# Patient Record
Sex: FEMALE | Race: WHITE | Employment: FULL TIME | URBAN - METROPOLITAN AREA
[De-identification: names, ages, dates, MRNs, and addresses within clinical notes are randomized per-mention and may not be internally consistent; named-entity substitution may affect disease eponyms.]

---

## 2017-01-30 DIAGNOSIS — M15.9 GENERALIZED OSTEOARTHRITIS: ICD-10-CM

## 2017-01-30 RX ORDER — CELECOXIB 200 MG/1
CAPSULE ORAL
Qty: 90 CAP | Refills: 1 | Status: SHIPPED | OUTPATIENT
Start: 2017-01-30 | End: 2017-07-24 | Stop reason: SDUPTHER

## 2017-02-14 ENCOUNTER — HOSPITAL ENCOUNTER (EMERGENCY)
Age: 57
Discharge: HOME | End: 2017-02-14
Attending: EMERGENCY MEDICINE

## 2017-02-14 VITALS
TEMPERATURE: 98.1 F | BODY MASS INDEX: 31.92 KG/M2 | OXYGEN SATURATION: 98 % | RESPIRATION RATE: 18 BRPM | HEART RATE: 74 BPM | SYSTOLIC BLOOD PRESSURE: 118 MMHG | DIASTOLIC BLOOD PRESSURE: 77 MMHG | HEIGHT: 68 IN | WEIGHT: 210.6 LBS

## 2017-02-14 DIAGNOSIS — J06.9 ACUTE UPPER RESPIRATORY INFECTION: Primary | ICD-10-CM

## 2017-02-14 RX ORDER — FLUTICASONE PROPIONATE 50 MCG
2 SPRAY, SUSPENSION (ML) NASAL DAILY
Qty: 1 BOTTLE | Refills: 0 | Status: SHIPPED | OUTPATIENT
Start: 2017-02-14 | End: 2017-03-30 | Stop reason: ALTCHOICE

## 2017-02-14 NOTE — DISCHARGE INSTRUCTIONS

## 2017-02-14 NOTE — UC PROVIDER NOTE
Patient is a 60 y.o. female presenting with nasal congestion. The history is provided by the patient. Nasal Congestion   This is a new problem. The current episode started more than 2 days ago. The problem occurs constantly. The problem has not changed since onset. Pertinent negatives include no chest pain, no abdominal pain, no headaches and no shortness of breath. Nothing aggravates the symptoms. Nothing relieves the symptoms. Treatments tried: nyquil and dayquil. The treatment provided no relief.         Past Medical History   Diagnosis Date    Anxiety     Cervical polyp 2014     BENIGN ENDOCERVICAL POLYP    Colon polyps     GERD (gastroesophageal reflux disease)     Hypertension     LGSIL (low grade squamous intraepithelial lesion) on Pap smear 2006     s/p colposcopy, PAP nl since then    Menopausal syndrome (hot flashes) 9/9/2013    Obesity     Osteoarthritis     Osteopenia 9/27/2016    Primary osteoarthritis of knees, bilateral 4/13/2016    Restless leg syndrome 9/9/2013        Past Surgical History   Procedure Laterality Date    Hx colonoscopy  2009     GI- Dr Feliz Garduno Hx colposcopy  2006    Hx colonoscopy  7/30/12     polyp, irregularities, repeat 3 yrs    Hx bunionectomy Left 2008    Hx other surgical Left 5/8/15     LEFT FOOT LAPIDUS ARTHRODESIS, LEFT 2ND, 3RD MET CUNEIFORM FUSION, GASTROCNEMIS RECESSION, STJ ARTHROERESIS (GENERAL WITH POPLITEAL BLOCK)    Hx colonoscopy  9/24/15     normal    Hx heent       DENTAL IMPLANT    Hx knee arthroscopy Left 2013     (torn meniscus)    Hx knee arthroscopy Right 3/14    Hx orthopaedic Left 2015     BUNIONECTOMY LEFT FOOT    Hx orthopaedic Left 2015     LEFT FOOT SURGERY - PLATES, SCREWS, PINS    Hx knee replacement Bilateral 04/13/2016         Family History   Problem Relation Age of Onset    Parkinsonism Father     Hypertension Father     Arthritis-osteo Father     Cancer Father      PROSTATE-TREATED WITH ESTROGEN    Cancer Sister BREAST    Breast Cancer Sister 48    Glaucoma Mother     Heart Disease Mother      multiple MI's in her 52's    Breast Cancer Daughter 48    Anesth Problems Neg Hx         Social History     Social History    Marital status:      Spouse name: N/A    Number of children: N/A    Years of education: N/A     Occupational History    Not on file. Social History Main Topics    Smoking status: Never Smoker    Smokeless tobacco: Never Used    Alcohol use 3.6 oz/week     6 Standard drinks or equivalent per week    Drug use: No    Sexual activity: No     Other Topics Concern    Not on file     Social History Narrative    ** Merged History Encounter **                     ALLERGIES: Review of patient's allergies indicates no known allergies. Review of Systems   Constitutional: Negative. HENT: Positive for postnasal drip, rhinorrhea, sinus pressure and sneezing. Negative for drooling, ear pain, mouth sores, sore throat and trouble swallowing. Eyes: Negative. Respiratory: Negative for cough and shortness of breath. Cardiovascular: Negative for chest pain. Gastrointestinal: Negative. Negative for abdominal pain. Musculoskeletal: Negative. Neurological: Negative for headaches. Vitals:    02/14/17 1601   BP: 118/77   Pulse: 74   Resp: 18   Temp: 98.1 °F (36.7 °C)   SpO2: 98%   Weight: 95.5 kg (210 lb 9.6 oz)   Height: 5' 8\" (1.727 m)       Physical Exam   Constitutional: She is oriented to person, place, and time. She appears well-developed and well-nourished. HENT:   Head: Normocephalic and atraumatic. Right Ear: External ear normal.   Left Ear: External ear normal.   Mouth/Throat: Oropharynx is clear and moist. No oropharyngeal exudate. Eyes: Conjunctivae and EOM are normal. Pupils are equal, round, and reactive to light. Right eye exhibits no discharge. Left eye exhibits no discharge. No scleral icterus. Neck: Normal range of motion. No tracheal deviation present. No thyromegaly present. Cardiovascular: Normal rate, regular rhythm and normal heart sounds. No murmur heard. Pulmonary/Chest: Effort normal and breath sounds normal. No respiratory distress. She has no wheezes. She has no rales. She exhibits no tenderness. Abdominal: Soft. Bowel sounds are normal. She exhibits no distension. There is no tenderness. There is no rebound and no guarding. Musculoskeletal: Normal range of motion. She exhibits no edema or tenderness. Lymphadenopathy:     She has no cervical adenopathy. Neurological: She is alert and oriented to person, place, and time. No cranial nerve deficit. Coordination normal.   Skin: Skin is warm. No erythema. Psychiatric: She has a normal mood and affect. Her behavior is normal. Judgment and thought content normal.   Nursing note and vitals reviewed.       MDM     Differential Diagnosis; Clinical Impression; Plan:     Viral uri      Procedures

## 2017-03-30 ENCOUNTER — OFFICE VISIT (OUTPATIENT)
Dept: FAMILY MEDICINE CLINIC | Age: 57
End: 2017-03-30

## 2017-03-30 VITALS
RESPIRATION RATE: 18 BRPM | BODY MASS INDEX: 33.8 KG/M2 | TEMPERATURE: 97.9 F | SYSTOLIC BLOOD PRESSURE: 107 MMHG | WEIGHT: 223 LBS | DIASTOLIC BLOOD PRESSURE: 75 MMHG | HEART RATE: 105 BPM | HEIGHT: 68 IN | OXYGEN SATURATION: 98 %

## 2017-03-30 DIAGNOSIS — J02.0 STREP PHARYNGITIS: Primary | ICD-10-CM

## 2017-03-30 RX ORDER — CHOLECALCIFEROL (VITAMIN D3) 125 MCG
2000 CAPSULE ORAL DAILY
COMMUNITY

## 2017-03-30 RX ORDER — AMOXICILLIN 875 MG/1
875 TABLET, FILM COATED ORAL 2 TIMES DAILY
Qty: 20 TAB | Refills: 0 | Status: SHIPPED | OUTPATIENT
Start: 2017-03-30 | End: 2017-04-09

## 2017-03-30 NOTE — PROGRESS NOTES
Subjective:   Anastasia Gallagher is a 60 y.o. female who complains of sore throat and nausea for 1 day, stable since that time. Throat feels same as when she had strep few months ago. She works in [employer], got a nurse to swab her throat, swab was + for strep. She denies a history of fevers, shortness of breath and wheezing. Evaluation to date: none. Treatment to date: OTC products. Patient does not smoke cigarettes. Relevant PMH:   Past Medical History:   Diagnosis Date    Anxiety     Cervical polyp 2014    BENIGN ENDOCERVICAL POLYP    Colon polyps     GERD (gastroesophageal reflux disease)     Hypertension     LGSIL (low grade squamous intraepithelial lesion) on Pap smear 2006    s/p colposcopy, PAP nl since then    Menopausal syndrome (hot flashes) 9/9/2013    Obesity     Osteoarthritis     Osteopenia 9/27/2016    Primary osteoarthritis of knees, bilateral 4/13/2016    Restless leg syndrome 9/9/2013     Past Surgical History:   Procedure Laterality Date    HX BUNIONECTOMY Left 2008    HX COLONOSCOPY  2009    GI- Dr Molly Guzman    HX COLONOSCOPY  7/30/12    polyp, irregularities, repeat 3 yrs    HX COLONOSCOPY  9/24/15    normal    HX COLPOSCOPY  2006    HX HEENT      DENTAL IMPLANT    HX KNEE ARTHROSCOPY Left 2013    (torn meniscus)    HX KNEE ARTHROSCOPY Right 3/14    HX KNEE REPLACEMENT Bilateral 04/13/2016    HX ORTHOPAEDIC Left 2015    BUNIONECTOMY LEFT FOOT    HX ORTHOPAEDIC Left 2015    LEFT FOOT SURGERY - PLATES, SCREWS, PINS    HX OTHER SURGICAL Left 5/8/15    LEFT FOOT LAPIDUS ARTHRODESIS, LEFT 2ND, 3RD MET CUNEIFORM FUSION, GASTROCNEMIS RECESSION, STJ ARTHROERESIS (GENERAL WITH POPLITEAL BLOCK)     No Known Allergies    Review of Systems  Pertinent items are noted in HPI.     Objective:     Visit Vitals    /75    Pulse (!) 105    Temp 97.9 °F (36.6 °C) (Oral)    Resp 18    Ht 5' 8\" (1.727 m)    Wt 223 lb (101.2 kg)    LMP 11/25/2012    SpO2 98%    BMI 33.91 kg/m2     General:  alert, cooperative, no distress   Eyes: negative   Ears: normal TM's and external ear canals AU   Sinuses: Normal paranasal sinuses without tenderness   Mouth:  Lips, mucosa, and tongue normal. Teeth and gums normal and normal findings: oropharynx pink & moist without lesions or evidence of thrush   Neck: supple, symmetrical, trachea midline and mild anterior cervical adenopathy. Heart: S1 and S2 normal, no murmurs noted. Lungs: clear to auscultation bilaterally     Rapid strep test that was brought in Ziploc bag appears positive       Assessment/Plan:       ICD-10-CM ICD-9-CM    1. Strep pharyngitis J02.0 034. 0      Amoxil as directed  Suggested symptomatic OTC remedies. Antibiotics per orders. RTC prn.       Darci Donahue NP

## 2017-03-30 NOTE — PATIENT INSTRUCTIONS

## 2017-04-18 DIAGNOSIS — N95.1 MENOPAUSAL SYNDROME (HOT FLASHES): ICD-10-CM

## 2017-04-18 RX ORDER — PAROXETINE 10 MG/1
TABLET, FILM COATED ORAL
Qty: 90 TAB | Refills: 1 | Status: SHIPPED | OUTPATIENT
Start: 2017-04-18 | End: 2017-09-28 | Stop reason: SDUPTHER

## 2017-05-23 ENCOUNTER — OFFICE VISIT (OUTPATIENT)
Dept: FAMILY MEDICINE CLINIC | Age: 57
End: 2017-05-23

## 2017-05-23 VITALS
WEIGHT: 223 LBS | RESPIRATION RATE: 18 BRPM | OXYGEN SATURATION: 97 % | SYSTOLIC BLOOD PRESSURE: 110 MMHG | TEMPERATURE: 98.6 F | DIASTOLIC BLOOD PRESSURE: 73 MMHG | BODY MASS INDEX: 33.8 KG/M2 | HEIGHT: 68 IN | HEART RATE: 88 BPM

## 2017-05-23 DIAGNOSIS — J02.0 STREP THROAT: Primary | ICD-10-CM

## 2017-05-23 RX ORDER — AMOXICILLIN AND CLAVULANATE POTASSIUM 875; 125 MG/1; MG/1
1 TABLET, FILM COATED ORAL EVERY 12 HOURS
Qty: 20 TAB | Refills: 0 | Status: SHIPPED | OUTPATIENT
Start: 2017-05-23 | End: 2017-06-02

## 2017-05-23 NOTE — MR AVS SNAPSHOT
Visit Information Date & Time Provider Department Dept. Phone Encounter #  
 5/23/2017  8:45 AM Delia Dubon, [address] [phone] [id number] Follow-up Instructions Return if symptoms worsen or fail to improve. Your Appointments 8/28/2017 12:30 PM  
PHYSICAL with Poly Peralta MD  
[facility] [address] [facility]) Appt Note: cpe; reschedule from 8/22/17  
 [address] [facility] [address]  
[address] [address]  [address] Upcoming Health Maintenance Date Due  
 PAP AKA CERVICAL CYTOLOGY 4/16/2017 INFLUENZA AGE 9 TO ADULT 8/1/2017 BREAST CANCER SCRN MAMMOGRAM 9/26/2018 COLONOSCOPY 9/24/2020 DTaP/Tdap/Td series (2 - Td) 4/3/2023 Allergies as of 5/23/2017  Review Complete On: 5/23/2017 By: Delia Dubon NP No Known Allergies Current Immunizations  Reviewed on 8/19/2016 Name Date Influenza Vaccine 10/13/2016, 10/13/2015, 10/1/2014, 10/1/2013, 12/1/2012 Tdap 4/3/2013 Not reviewed this visit You Were Diagnosed With   
  
 Codes Comments Strep throat    -  Primary ICD-10-CM: J02.0 ICD-9-CM: 034.0 Vitals BP Pulse Temp Resp Height(growth percentile) Weight(growth percentile) 110/73 88 98.6 °F (37 °C) (Oral) 18 5' 8\" (1.727 m) 223 lb (101.2 kg) LMP SpO2 BMI OB Status Smoking Status 11/25/2012 97% 33.91 kg/m2 Postmenopausal Never Smoker BMI and BSA Data Body Mass Index Body Surface Area  
 33.91 kg/m 2 2.2 m 2 Preferred Pharmacy Pharmacy Name Phone [id number] [phone] Your Updated Medication List  
  
   
This list is accurate as of: 5/23/17  9:26 AM.  Always use your most recent med list.  
  
  
  
  
 acetaminophen 500 mg tablet Commonly known as:  TYLENOL  
 Take 1 Tab by mouth every four (4) hours (while awake). Indications: PAIN  
  
 ALPRAZolam 0.5 mg tablet Commonly known as:  Ezzie Irma Take 1 Tab by mouth every twelve (12) hours as needed. amoxicillin-clavulanate 875-125 mg per tablet Commonly known as:  AUGMENTIN Take 1 Tab by mouth every twelve (12) hours for 10 days. celecoxib 200 mg capsule Commonly known as:  CELEBREX  
TAKE 1 CAP BY MOUTH DAILY. lisinopril 20 mg tablet Commonly known as:  PRINIVIL, ZESTRIL  
TAKE 1 TABLET DAILY MULTIVITAMIN PO Take 1 Tab by mouth daily. omeprazole 40 mg capsule Commonly known as:  PRILOSEC  
TAKE 1 CAPSULE DAILY PARoxetine 10 mg tablet Commonly known as:  PAXIL TAKE 1 TAB BY MOUTH DAILY. VITAMIN D3 2,000 unit Tab Generic drug:  cholecalciferol (vitamin D3) Take  by mouth. Prescriptions Sent to Pharmacy Refills  
 amoxicillin-clavulanate (AUGMENTIN) 875-125 mg per tablet 0 Sig: Take 1 Tab by mouth every twelve (12) hours for 10 days. Class: Normal  
 Pharmacy: [address] #: [phone] Route: Oral  
  
Follow-up Instructions Return if symptoms worsen or fail to improve. Patient Instructions Sore Throat: Care Instructions Your Care Instructions Infection by bacteria or a virus causes most sore throats. Cigarette smoke, dry air, air pollution, allergies, and yelling can also cause a sore throat. Sore throats can be painful and annoying. Fortunately, most sore throats go away on their own. If you have a bacterial infection, your doctor may prescribe antibiotics. Follow-up care is a key part of your treatment and safety. Be sure to make and go to all appointments, and call your doctor if you are having problems. It's also a good idea to know your test results and keep a list of the medicines you take. How can you care for yourself at home? · If your doctor prescribed antibiotics, take them as directed. Do not stop taking them just because you feel better. You need to take the full course of antibiotics. · Gargle with warm salt water once an hour to help reduce swelling and relieve discomfort. Use 1 teaspoon of salt mixed in 1 cup of warm water. · Take an over-the-counter pain medicine, such as acetaminophen (Tylenol), ibuprofen (Advil, Motrin), or naproxen (Aleve). Read and follow all instructions on the label. · Be careful when taking over-the-counter cold or flu medicines and Tylenol at the same time. Many of these medicines have acetaminophen, which is Tylenol. Read the labels to make sure that you are not taking more than the recommended dose. Too much acetaminophen (Tylenol) can be harmful. · Drink plenty of fluids. Fluids may help soothe an irritated throat. Hot fluids, such as tea or soup, may help decrease throat pain. · Use over-the-counter throat lozenges to soothe pain. Regular cough drops or hard candy may also help. These should not be given to young children because of the risk of choking. · Do not smoke or allow others to smoke around you. If you need help quitting, talk to your doctor about stop-smoking programs and medicines. These can increase your chances of quitting for good. · Use a vaporizer or humidifier to add moisture to your bedroom. Follow the directions for cleaning the machine. When should you call for help? Call your doctor now or seek immediate medical care if: 
· You have new or worse trouble swallowing. · Your sore throat gets much worse on one side. Watch closely for changes in your health, and be sure to contact your doctor if you do not get better as expected. Where can you learn more? Go to http://jairon-ryan.info/. Enter [phone] [id number] [phone] in the search box to learn more about \"Sore Throat: Care Instructions. \" Current as of: July 29, 2016 Content Version: 11.2 © 0240-6406 [employer]. Care instructions adapted under license by [employer] (which disclaims liability or warranty for this information). If you have questions about a medical condition or this instruction, always ask your healthcare professional. [address] any warranty or liability for your use of this information. Introducing [facility]! Dear Matthew Agudelo: 
Thank you for requesting a MatchMine account. Our records indicate that you already have an active MatchMine account. You can access your account anytime at [email or web address]. [email or web address] Play Sports Did you know that you can access your hospital and ER discharge instructions at any time in MatchMine? You can also review all of your test results from your hospital stay or ER visit. Additional Information If you have questions, please visit the Frequently Asked Questions section of the MatchMine website at [email or web address] Remember, MatchMine is NOT to be used for urgent needs. For medical emergencies, dial 911. Now available from your iPhone and Android! Please provide this summary of care documentation to your next provider. Your primary care clinician is listed as Enma Kramer. If you have any questions after today's visit, please call [phone].

## 2017-05-23 NOTE — PROGRESS NOTES
Chief Complaint   Patient presents with    Sore Throat     for 1 day, tested positive for strep in her office today

## 2017-05-24 NOTE — PROGRESS NOTES
Subjective:   Vishal Blas is a 60 y.o. female who complains of sore throat and swollen glands for 4 days. She denies a history of shortness of breath and wheezing. Patient does not smoke cigarettes. Relevant PMH: No pertinent additional PMH. Objective:      Visit Vitals    /73    Pulse 88    Temp 98.6 °F (37 °C) (Oral)    Resp 18    Ht 5' 8\" (1.727 m)    Wt 223 lb (101.2 kg)    LMP 11/25/2012    SpO2 97%    BMI 33.91 kg/m2      Appears in mild to moderate distress. Ears: bilateral TM's and external ear canals normal  Oropharynx: erythematous and exudate noted  Neck: bilateral symmetric anterior adenopathy  Lungs: clear to auscultation, no wheezes, rales or rhonchi, symmetric air entry  The abdomen is soft without tenderness or hepatosplenomegaly. Rapid Strep test is positive    Assessment/Plan:   strep pharyngitis  Per orders. Gargle, use acetaminophen or other OTC analgesic, and take Rx fully as prescribed. Call if other family members develop similar symptoms. See prn. ICD-10-CM ICD-9-CM    1. Strep throat J02.0 034.0 amoxicillin-clavulanate (AUGMENTIN) 875-125 mg per tablet   .

## 2017-06-20 ENCOUNTER — OFFICE VISIT (OUTPATIENT)
Dept: INTERNAL MEDICINE CLINIC | Age: 57
End: 2017-06-20

## 2017-06-20 VITALS
RESPIRATION RATE: 16 BRPM | BODY MASS INDEX: 33.95 KG/M2 | DIASTOLIC BLOOD PRESSURE: 80 MMHG | WEIGHT: 224 LBS | TEMPERATURE: 98.2 F | OXYGEN SATURATION: 96 % | HEART RATE: 86 BPM | HEIGHT: 68 IN | SYSTOLIC BLOOD PRESSURE: 116 MMHG

## 2017-06-20 DIAGNOSIS — J02.9 SORE THROAT: Primary | ICD-10-CM

## 2017-06-20 DIAGNOSIS — R05.9 COUGH: ICD-10-CM

## 2017-06-20 DIAGNOSIS — J35.1 ENLARGED TONSILS: ICD-10-CM

## 2017-06-20 LAB
S PYO AG THROAT QL: NEGATIVE
VALID INTERNAL CONTROL?: YES

## 2017-06-20 RX ORDER — BENZONATATE 200 MG/1
200 CAPSULE ORAL
Qty: 20 CAP | Refills: 0 | Status: SHIPPED | OUTPATIENT
Start: 2017-06-20 | End: 2017-06-27

## 2017-06-20 NOTE — PROGRESS NOTES
Su Byrd is a 60 y.o. female who was seen in clinic today (6/20/2017). Assessment & Plan:  Diagnoses and all orders for this visit:    1. Sore throat- RST negative today, 3+ tests in last 9 months, will have her see ENT & check throat cx.  -     AMB POC RAPID STREP A  -     REFERRAL TO ENT-OTOLARYNGOLOGY  -     UPPER RESPIRATORY CULTURE    2. Enlarged tonsils  -     REFERRAL TO ENT-OTOLARYNGOLOGY  -     UPPER RESPIRATORY CULTURE    3. Cough- new dx, likely post nasal drip vs bronchitis, otherwise asymptomatic, cont to monitor, start on meds below. Red flags and expectations were reviewed & discussed with the her. She verbalized understanding.   -     benzonatate (TESSALON) 200 mg capsule; Take 1 Cap by mouth three (3) times daily as needed for Cough for up to 7 days. Follow-up Disposition:  Return if symptoms worsen or fail to improve.       ----------------------------------------------------------------------    Subjective:  Sherrell Boas was seen today for Sore Throat and Cough    URI Review  Sherrell Boas returns to clinic today to talk about: sore throat for the last 1 month, which is fluctuating since that time. She also reports productive cough. She denies a history of: fever, chills, rhinorrhea, sinus congestion and chest congestion. Treatments have included: none. Relevant PMH: this is her 4th sore throat visit since 10/16. She has had strep x 3 times. Patient reports sick contacts: no.           Prior to Admission medications    Medication Sig Start Date End Date Taking? Authorizing Provider   PARoxetine (PAXIL) 10 mg tablet TAKE 1 TAB BY MOUTH DAILY. 4/18/17  Yes Adrienne Muhammad MD   cholecalciferol, vitamin D3, (VITAMIN D3) 2,000 unit tab Take 2,000 Units by mouth daily. Yes Historical Provider   celecoxib (CELEBREX) 200 mg capsule TAKE 1 CAP BY MOUTH DAILY.  1/30/17  Yes Adrienne Muhammad MD   omeprazole (PRILOSEC) 40 mg capsule TAKE 1 CAPSULE DAILY  Patient taking differently: TAKE 1 CAPSULE by mouth DAILY as needed 9/7/16  Yes Max Engle MD   ALPRAZolam Abby Doll) 0.5 mg tablet Take 1 Tab by mouth every twelve (12) hours as needed. 8/19/16  Yes Max Engle MD   lisinopril (PRINIVIL, ZESTRIL) 20 mg tablet TAKE 1 TABLET DAILY  Patient taking differently: Take 20 mg by mouth daily. TAKE 1 TABLET DAILY 8/19/16  Yes Max Engle MD   acetaminophen (TYLENOL) 500 mg tablet Take 1 Tab by mouth every four (4) hours (while awake). Indications: PAIN  Patient taking differently: Take 500 mg by mouth every six (6) hours as needed. Indications: Pain 4/15/16  Yes Jo Pitts MD   MULTIVITAMIN PO Take 1 Tab by mouth daily. 4/27/11  Yes Historical Provider          No Known Allergies        ROS : per HPI       Objective:   Physical Exam   Constitutional: No distress. HENT:   Right Ear: Tympanic membrane is not erythematous and not bulging. No middle ear effusion. Left Ear: Tympanic membrane is not erythematous and not bulging. No middle ear effusion. Nose: No mucosal edema or rhinorrhea. Right sinus exhibits no maxillary sinus tenderness and no frontal sinus tenderness. Left sinus exhibits no maxillary sinus tenderness and no frontal sinus tenderness. Mouth/Throat: Uvula is midline and mucous membranes are normal. Posterior oropharyngeal erythema present. No oropharyngeal exudate. Tonsils no visible on the L, 2+ on the R   Eyes: Conjunctivae are normal. No scleral icterus. Neck: Neck supple. Cardiovascular: Regular rhythm and normal heart sounds. No murmur heard. Pulmonary/Chest: Effort normal and breath sounds normal. She has no wheezes. She has no rales. Lymphadenopathy:     She has no cervical adenopathy.          Visit Vitals    /80    Pulse 86    Temp 98.2 °F (36.8 °C) (Oral)    Resp 16    Ht 5' 8\" (1.727 m)    Wt 224 lb (101.6 kg)    LMP 11/25/2012    SpO2 96%    BMI 34.06 kg/m2         Disclaimer:  Advised her to call back or return to office if symptoms worsen/change/persist.  Discussed expected course/resolution/complications of diagnosis in detail with patient. Medication risks/benefits/costs/interactions/alternatives discussed with patient. She was given an after visit summary which includes diagnoses, current medications, & vitals. She expressed understanding with the diagnosis and plan.         Coty Nowak MD

## 2017-06-20 NOTE — PATIENT INSTRUCTIONS
Sore Throat: Care Instructions  Your Care Instructions    Infection by bacteria or a virus causes most sore throats. Cigarette smoke, dry air, air pollution, allergies, and yelling can also cause a sore throat. Sore throats can be painful and annoying. Fortunately, most sore throats go away on their own. If you have a bacterial infection, your doctor may prescribe antibiotics. Follow-up care is a key part of your treatment and safety. Be sure to make and go to all appointments, and call your doctor if you are having problems. It's also a good idea to know your test results and keep a list of the medicines you take. How can you care for yourself at home? · If your doctor prescribed antibiotics, take them as directed. Do not stop taking them just because you feel better. You need to take the full course of antibiotics. · Gargle with warm salt water once an hour to help reduce swelling and relieve discomfort. Use 1 teaspoon of salt mixed in 1 cup of warm water. · Take an over-the-counter pain medicine, such as acetaminophen (Tylenol), ibuprofen (Advil, Motrin), or naproxen (Aleve). Read and follow all instructions on the label. · Be careful when taking over-the-counter cold or flu medicines and Tylenol at the same time. Many of these medicines have acetaminophen, which is Tylenol. Read the labels to make sure that you are not taking more than the recommended dose. Too much acetaminophen (Tylenol) can be harmful. · Drink plenty of fluids. Fluids may help soothe an irritated throat. Hot fluids, such as tea or soup, may help decrease throat pain. · Use over-the-counter throat lozenges to soothe pain. Regular cough drops or hard candy may also help. These should not be given to young children because of the risk of choking. · Do not smoke or allow others to smoke around you. If you need help quitting, talk to your doctor about stop-smoking programs and medicines.  These can increase your chances of quitting for good. · Use a vaporizer or humidifier to add moisture to your bedroom. Follow the directions for cleaning the machine. When should you call for help? Call your doctor now or seek immediate medical care if:  · You have new or worse trouble swallowing. · Your sore throat gets much worse on one side. Watch closely for changes in your health, and be sure to contact your doctor if you do not get better as expected. Where can you learn more? Go to http://jairon-ryan.info/. Enter [phone] in the search box to learn more about \"Sore Throat: Care Instructions. \"  Current as of: July 29, 2016  Content Version: 11.3  © 1263-4000 [employer]. Care instructions adapted under license by [employer] (which disclaims liability or warranty for this information). If you have questions about a medical condition or this instruction, always ask your healthcare professional. [address] any warranty or liability for your use of this information.

## 2017-06-22 ENCOUNTER — PATIENT MESSAGE (OUTPATIENT)
Dept: INTERNAL MEDICINE CLINIC | Age: 57
End: 2017-06-22

## 2017-06-22 LAB
BACTERIA SPEC RESP CULT: ABNORMAL
BACTERIA SPEC RESP CULT: ABNORMAL

## 2017-06-22 RX ORDER — AMOXICILLIN 875 MG/1
875 TABLET, FILM COATED ORAL 2 TIMES DAILY
Qty: 20 TAB | Refills: 0 | Status: SHIPPED | OUTPATIENT
Start: 2017-06-22 | End: 2017-07-02

## 2017-06-23 NOTE — TELEPHONE ENCOUNTER
----- Message from Nisha Arreaga MD sent at 6/22/2017  8:55 PM EDT -----  Regarding: FW: Test Results Question  Contact: [phone]  Send copy of my note & throat cx to Dr Nisha Arreaga (ENT).    ----- Message -----     From: Cecil Frankel     Sent: 6/22/2017   8:08 PM       To: [facility] Malinda Percy  Subject: Test Results Question                            I just saw the message where my culture tested positive for strep. I've visited with Dr. Nisha Arreaga today and he already put me on an antibiotic, a nasal rinse, and a throat gargle.

## 2017-06-23 NOTE — PROGRESS NOTES
Please call patient. RST negative in the office but throat cx + for strep. Amoxicillin has been sent in.   Needs to see ENT due to recurrent infections

## 2017-07-24 DIAGNOSIS — M15.9 GENERALIZED OSTEOARTHRITIS: ICD-10-CM

## 2017-07-24 RX ORDER — CELECOXIB 200 MG/1
CAPSULE ORAL
Qty: 90 CAP | Refills: 1 | Status: SHIPPED | OUTPATIENT
Start: 2017-07-24 | End: 2018-01-13 | Stop reason: SDUPTHER

## 2017-09-28 DIAGNOSIS — N95.1 MENOPAUSAL SYNDROME (HOT FLASHES): ICD-10-CM

## 2017-09-28 RX ORDER — PAROXETINE 10 MG/1
TABLET, FILM COATED ORAL
Qty: 90 TAB | Refills: 0 | Status: SHIPPED | OUTPATIENT
Start: 2017-09-28 | End: 2017-12-31 | Stop reason: SDUPTHER

## 2017-10-06 ENCOUNTER — HOSPITAL ENCOUNTER (OUTPATIENT)
Dept: MAMMOGRAPHY | Age: 57
Discharge: HOME | End: 2017-10-06
Attending: INTERNAL MEDICINE
Payer: COMMERCIAL

## 2017-10-06 DIAGNOSIS — Z12.31 VISIT FOR SCREENING MAMMOGRAM: ICD-10-CM

## 2017-10-06 PROCEDURE — 77063 BREAST TOMOSYNTHESIS BI: CPT

## 2017-10-07 ENCOUNTER — OFFICE VISIT (OUTPATIENT)
Dept: PRIMARY CARE CLINIC | Age: 57
End: 2017-10-07

## 2017-10-07 DIAGNOSIS — R11.2 NAUSEA AND VOMITING, INTRACTABILITY OF VOMITING NOT SPECIFIED, UNSPECIFIED VOMITING TYPE: ICD-10-CM

## 2017-10-07 DIAGNOSIS — R19.7 DIARRHEA, UNSPECIFIED TYPE: ICD-10-CM

## 2017-10-07 DIAGNOSIS — J10.1 INFLUENZA B: Primary | ICD-10-CM

## 2017-10-07 RX ORDER — ONDANSETRON 4 MG/1
4 TABLET, ORALLY DISINTEGRATING ORAL
Qty: 10 TAB | Refills: 0 | Status: SHIPPED | OUTPATIENT
Start: 2017-10-07 | End: 2017-11-19

## 2017-10-07 RX ORDER — OSELTAMIVIR PHOSPHATE 75 MG/1
75 CAPSULE ORAL 2 TIMES DAILY
Qty: 10 CAP | Refills: 0 | Status: SHIPPED | OUTPATIENT
Start: 2017-10-07 | End: 2017-10-12

## 2017-10-07 NOTE — PROGRESS NOTES
Subjective:   Aubrie Merino is a 59 y.o. female who complains of headache, fever (Tmax 100.2), chills, nausea, vomiting, and diarrhea for 1 day, gradually improving since that time. Symptoms started suddenly last night around 5pm after work. She reports 10-12 episodes of vomiting and diarrhea that lasted for about 6-7 hours. Today she has headache, but denies any further nausea, vomiting, or diarrhea. She denies any sore throat, cough, or congestion. She denies any abdominal pain or bloody stools. She is taking Tylenol for the h/a. Today she is taking fluids and ate some eggs which she has tolerated well. She has not had her flu vaccine for this season yet. Denies any sick contacts. Denies any foreign travel. Evaluation to date: none. Treatment to date: OTC products. Patient does not smoke cigarettes.   Relevant PMH:   Past Medical History:   Diagnosis Date    Anxiety     Cervical polyp 2014    BENIGN ENDOCERVICAL POLYP    Colon polyps     GERD (gastroesophageal reflux disease)     Hypertension     LGSIL (low grade squamous intraepithelial lesion) on Pap smear 2006    s/p colposcopy, PAP nl since then    Menopausal syndrome (hot flashes) 9/9/2013    Obesity     Osteoarthritis     Osteopenia 9/27/2016    Primary osteoarthritis of knees, bilateral 4/13/2016    Restless leg syndrome 9/9/2013     Past Surgical History:   Procedure Laterality Date    HX BUNIONECTOMY Left 2008    HX COLONOSCOPY  2009    GI- Dr Steve Arevalo    HX COLONOSCOPY  7/30/12    polyp, irregularities, repeat 3 yrs    HX COLONOSCOPY  9/24/15    normal    HX COLPOSCOPY  2006    HX HEENT      DENTAL IMPLANT    HX KNEE ARTHROSCOPY Left 2013    (torn meniscus)    HX KNEE ARTHROSCOPY Right 3/14    HX KNEE REPLACEMENT Bilateral 04/13/2016    HX ORTHOPAEDIC Left 2015    BUNIONECTOMY LEFT FOOT    HX ORTHOPAEDIC Left 2015    LEFT FOOT SURGERY - PLATES, SCREWS, PINS    HX OTHER SURGICAL Left 5/8/15    LEFT FOOT LAPIDUS ARTHRODESIS, LEFT 2ND, 3RD MET CUNEIFORM FUSION, GASTROCNEMIS RECESSION, STJ ARTHROERESIS (GENERAL WITH POPLITEAL BLOCK)     No Known Allergies      Review of Systems  Pertinent items are noted in HPI. Objective:     Visit Vitals    /84 (BP 1 Location: Left arm, BP Patient Position: Sitting)    Pulse (!) 2    Temp 99.2 °F (37.3 °C) (Oral)    Wt 234 lb 3.2 oz (106.2 kg)    LMP 11/25/2012    SpO2 96%    BMI 35.61 kg/m2     General:  alert, cooperative, no distress   Eyes: negative   Ears: normal TM's and external ear canals AU   Sinuses: Normal paranasal sinuses without tenderness   Mouth:  Lips, mucosa, and tongue normal. Teeth and gums normal and normal findings: oropharynx pink & moist without lesions   Neck: supple, symmetrical, trachea midline and no adenopathy. Heart: S1 and S2 normal, no murmurs noted. Lungs: clear to auscultation bilaterally   Abdomen: soft, non-tender. Bowel sounds normal. No masses,  no organomegaly        Rapid flu - + influenza B    Assessment/Plan:       ICD-10-CM ICD-9-CM    1. Influenza B J10.1 487.1    2. Nausea and vomiting, intractability of vomiting not specified, unspecified vomiting type R11.2 787.01    3. Diarrhea, unspecified type R19.7 787.91      Orders Placed This Encounter    ondansetron (ZOFRAN ODT) 4 mg disintegrating tablet    oseltamivir (TAMIFLU) 75 mg capsule     Precautions given, discussed RTW, work note given. Clear liquids, then advance to bland diet. Suggested symptomatic OTC remedies. RTC prn. Karina Zaman NP  This note will not be viewable in [address]

## 2017-10-07 NOTE — LETTER
NOTIFICATION RETURN TO WORK / SCHOOL 
 
10/7/2017 1:57 PM 
 
Ms. Brody Gibson [address] [place] “” [place] [address] To Whom It May Concern: 
 
Brody Gibson is currently under the care of [address] [place] [address]. She will return to work/school on TBD. If there are questions or concerns please have the patient contact our office. Sincerely, Miranda Mendoza NP

## 2017-10-07 NOTE — MR AVS SNAPSHOT
Visit Information Date & Time Provider Department Dept. Phone Encounter #  
 10/7/2017  1:00 PM Vivian Jean NP [address] [address] [phone] [id number] Follow-up Instructions Return if symptoms worsen or fail to improve. Your Appointments 1/23/2018  2:30 PM  
WELL WOMAN EXAM with Tato Rosario MD  
[address] [facility]) Appt Note: cpe; reschedule from 8/22/17; cpe  
 [address] [facility] [address] [facility] [address]  
[address] [id number] [address] Upcoming Health Maintenance Date Due  
 PAP AKA CERVICAL CYTOLOGY 4/16/2017 INFLUENZA AGE 9 TO ADULT 8/1/2017 BREAST CANCER SCRN MAMMOGRAM 10/6/2019 COLONOSCOPY 9/24/2020 DTaP/Tdap/Td series (2 - Td) 4/3/2023 Allergies as of 10/7/2017  Review Complete On: 10/7/2017 By: Vivian Jean NP No Known Allergies Current Immunizations  Reviewed on 6/20/2017 Name Date Influenza Vaccine 10/13/2016, 10/13/2015, 10/1/2014, 10/1/2013, 12/1/2012 Tdap 4/3/2013 Not reviewed this visit You Were Diagnosed With   
  
 Codes Comments Influenza B    -  Primary ICD-10-CM: J10.1 ICD-9-CM: 644.9 Nausea and vomiting, intractability of vomiting not specified, unspecified vomiting type     ICD-10-CM: R11.2 ICD-9-CM: 787.01 Diarrhea, unspecified type     ICD-10-CM: R19.7 ICD-9-CM: 787.91 Vitals BP Pulse Temp Weight(growth percentile) LMP SpO2  
 116/84 (BP 1 Location: Left arm, BP Patient Position: Sitting) (!) 2 99.2 °F (37.3 °C) (Oral) 234 lb 3.2 oz (106.2 kg) 11/25/2012 96% BMI OB Status Smoking Status 35.61 kg/m2 Postmenopausal Never Smoker Vitals History BMI and BSA Data Body Mass Index Body Surface Area  
 35.61 kg/m 2 2.26 m 2 Preferred Pharmacy Pharmacy Name Phone  [id number] [id number], [facility] AT AT [facility] [phone] Your Updated Medication List  
  
   
This list is accurate as of: 10/7/17  1:53 PM.  Always use your most recent med list.  
  
  
  
  
 acetaminophen 500 mg tablet Commonly known as:  TYLENOL Take 1 Tab by mouth every four (4) hours (while awake). Indications: PAIN  
  
 ALPRAZolam 0.5 mg tablet Commonly known as:  [place] Take 1 Tab by mouth every twelve (12) hours as needed. celecoxib 200 mg capsule Commonly known as:  CELEBREX  
TAKE 1 CAP BY MOUTH DAILY. lisinopril 20 mg tablet Commonly known as:  PRINIVIL, ZESTRIL  
TAKE 1 TABLET DAILY MULTIVITAMIN PO Take 1 Tab by mouth daily. omeprazole 40 mg capsule Commonly known as:  PRILOSEC  
TAKE 1 CAPSULE DAILY  
  
 ondansetron 4 mg disintegrating tablet Commonly known as:  ZOFRAN ODT Take 1 Tab by mouth every eight (8) hours as needed for Nausea. PARoxetine 10 mg tablet Commonly known as:  PAXIL TAKE 1 TAB BY MOUTH DAILY. VITAMIN D3 2,000 unit Tab Generic drug:  cholecalciferol (vitamin D3) Take 2,000 Units by mouth daily. Prescriptions Sent to Pharmacy Refills  
 ondansetron (ZOFRAN ODT) 4 mg disintegrating tablet 0 Sig: Take 1 Tab by mouth every eight (8) hours as needed for Nausea. Class: Normal  
 Pharmacy: [address] #: [phone] Route: Oral  
  
Follow-up Instructions Return if symptoms worsen or fail to improve. Patient Instructions Influenza (Flu): Care Instructions Your Care Instructions Influenza (flu) is an infection in the lungs and breathing passages. It is caused by the influenza virus. There are different strains, or types, of the flu virus from year to year.  Unlike the common cold, the flu comes on suddenly and the symptoms, such as a cough, congestion, fever, chills, fatigue, aches, and pains, are more severe. These symptoms may last up to 10 days. Although the flu can make you feel very sick, it usually doesn't cause serious health problems. Home treatment is usually all you need for flu symptoms. But your doctor may prescribe antiviral medicine to prevent other health problems, such as pneumonia, from developing. Older people and those who have a long-term health condition, such as lung disease, are most at risk for having pneumonia or other health problems. Follow-up care is a key part of your treatment and safety. Be sure to make and go to all appointments, and call your doctor if you are having problems. Its also a good idea to know your test results and keep a list of the medicines you take. How can you care for yourself at home? · Get plenty of rest. 
· Drink plenty of fluids, enough so that your urine is light yellow or clear like water. If you have kidney, heart, or liver disease and have to limit fluids, talk with your doctor before you increase the amount of fluids you drink. · Take an over-the-counter pain medicine if needed, such as acetaminophen (Tylenol), ibuprofen (Advil, Motrin), or naproxen (Aleve), to relieve fever, headache, and muscle aches. Read and follow all instructions on the label. No one younger than 20 should take aspirin. It has been linked to Reye syndrome, a serious illness. · Do not smoke. Smoking can make the flu worse. If you need help quitting, talk to your doctor about stop-smoking programs and medicines. These can increase your chances of quitting for good. · Breathe moist air from a hot shower or from a sink filled with hot water to help clear a stuffy nose. · Before you use cough and cold medicines, check the label. These medicines may not be safe for young children or for people with certain health problems. · If the skin around your nose and lips becomes sore, put some petroleum jelly on the area. · To ease coughing: ¨ Drink fluids to soothe a scratchy throat. ¨ Suck on cough drops or plain hard candy. ¨ Take an over-the-counter cough medicine that contains dextromethorphan to help you get some sleep. Read and follow all instructions on the label. ¨ Raise your head at night with an extra pillow. This may help you rest if coughing keeps you awake. · Take any prescribed medicine exactly as directed. Call your doctor if you think you are having a problem with your medicine. To avoid spreading the flu · Wash your hands regularly, and keep your hands away from your face. · Stay home from school, work, and other public places until you are feeling better and your fever has been gone for at least 24 hours. The fever needs to have gone away on its own without the help of medicine. · Ask people living with you to talk to their doctors about preventing the flu. They may get antiviral medicine to keep from getting the flu from you. · To prevent the flu in the future, get a flu vaccine every fall. Encourage people living with you to get the vaccine. · Cover your mouth when you cough or sneeze. When should you call for help? Call 911 anytime you think you may need emergency care. For example, call if: 
· You have severe trouble breathing. Call your doctor now or seek immediate medical care if: 
· You have new or worse trouble breathing. · You seem to be getting much sicker. · You feel very sleepy or confused. · You have a new or higher fever. · You get a new rash. Watch closely for changes in your health, and be sure to contact your doctor if: 
· You begin to get better and then get worse. · You are not getting better after 1 week. Where can you learn more? Go to http://jairon-ryan.info/. Enter R319 in the search box to learn more about \"Influenza (Flu): Care Instructions. \" Current as of: March 25, 2017 Content Version: 11.3 © 4297-6889 [employer]ing, [employer]orporated.  Care instructions adapted under license by [address] (which disclaims liability or warranty for this information). If you have questions about a medical condition or this instruction, always ask your healthcare professional. [address] any warranty or liability for your use of this information. Nausea and Vomiting: Care Instructions Your Care Instructions When you are nauseated, you may feel weak and sweaty and notice a lot of saliva in your mouth. Nausea often leads to vomiting. Most of the time you do not need to worry about nausea and vomiting, but they can be signs of other illnesses. Two common causes of nausea and vomiting are stomach flu and food poisoning. Nausea and vomiting from viral stomach flu will usually start to improve within 24 hours. Nausea and vomiting from food poisoning may last from 12 to 48 hours. The doctor has checked you carefully, but problems can develop later. If you notice any problems or new symptoms, get medical treatment right away. Follow-up care is a key part of your treatment and safety. Be sure to make and go to all appointments, and call your doctor if you are having problems. It's also a good idea to know your test results and keep a list of the medicines you take. How can you care for yourself at home? · To prevent dehydration, drink plenty of fluids, enough so that your urine is light yellow or clear like water. Choose water and other caffeine-free clear liquids until you feel better. If you have kidney, heart, or liver disease and have to limit fluids, talk with your doctor before you increase the amount of fluids you drink. · Rest in bed until you feel better. · When you are able to eat, try clear soups, mild foods, and liquids until all symptoms are gone for 12 to 48 hours. Other good choices include dry toast, crackers, cooked cereal, and gelatin dessert, such as Jell-O. When should you call for help? Call 911 anytime you think you may need emergency care. For example, call if: 
· You passed out (lost consciousness). Call your doctor now or seek immediate medical care if: 
· You have symptoms of dehydration, such as: ¨ Dry eyes and a dry mouth. ¨ Passing only a little dark urine. ¨ Feeling thirstier than usual. 
· You have new or worsening belly pain. · You have a new or higher fever. · You vomit blood or what looks like coffee grounds. Watch closely for changes in your health, and be sure to contact your doctor if: 
· You have ongoing nausea and vomiting. · Your vomiting is getting worse. · Your vomiting lasts longer than 2 days. · You are not getting better as expected. Where can you learn more? Go to http://jairon-ryan.info/. Enter 25 [id number] in the search box to learn more about \"Nausea and Vomiting: Care Instructions. \" Current as of: March 20, 2017 Content Version: 11.3 © 2209-6458 [employer]. Care instructions adapted under license by [employer] (which disclaims liability or warranty for this information). If you have questions about a medical condition or this instruction, always ask your healthcare professional. [address] [address] any warranty or liability for your use of this information. Introducing [facility]! Dear Awa Mike: 
Thank you for requesting a IPICO account. Our records indicate that you already have an active IPICO account. You can access your account anytime at [email or web address] Did you know that you can access your hospital and ER discharge instructions at any time in IPICO? You can also review all of your test results from your hospital stay or ER visit. Additional Information If you have questions, please visit the Frequently Asked Questions section of the IPICO website at [email or web address] Remember, Q-gohart is NOT to be used for urgent needs. For medical emergencies, dial 911. Now available from your iPhone and Android! Please provide this summary of care documentation to your next provider. Your primary care clinician is listed as Skipper Hammans. If you have any questions after today's visit, please call [phone].

## 2017-10-07 NOTE — PROGRESS NOTES
RM #      PHQ over the last two weeks 10/7/2017   Little interest or pleasure in doing things Not at all   Feeling down, depressed or hopeless Not at all   Total Score PHQ 2 0     Patient has not had flu vaccaine this year.

## 2017-10-07 NOTE — PATIENT INSTRUCTIONS
Influenza (Flu): Care Instructions  Your Care Instructions  Influenza (flu) is an infection in the lungs and breathing passages. It is caused by the influenza virus. There are different strains, or types, of the flu virus from year to year. Unlike the common cold, the flu comes on suddenly and the symptoms, such as a cough, congestion, fever, chills, fatigue, aches, and pains, are more severe. These symptoms may last up to 10 days. Although the flu can make you feel very sick, it usually doesn't cause serious health problems. Home treatment is usually all you need for flu symptoms. But your doctor may prescribe antiviral medicine to prevent other health problems, such as pneumonia, from developing. Older people and those who have a long-term health condition, such as lung disease, are most at risk for having pneumonia or other health problems. Follow-up care is a key part of your treatment and safety. Be sure to make and go to all appointments, and call your doctor if you are having problems. Its also a good idea to know your test results and keep a list of the medicines you take. How can you care for yourself at home? · Get plenty of rest.  · Drink plenty of fluids, enough so that your urine is light yellow or clear like water. If you have kidney, heart, or liver disease and have to limit fluids, talk with your doctor before you increase the amount of fluids you drink. · Take an over-the-counter pain medicine if needed, such as acetaminophen (Tylenol), ibuprofen (Advil, Motrin), or naproxen (Aleve), to relieve fever, headache, and muscle aches. Read and follow all instructions on the label. No one younger than 20 should take aspirin. It has been linked to Reye syndrome, a serious illness. · Do not smoke. Smoking can make the flu worse. If you need help quitting, talk to your doctor about stop-smoking programs and medicines. These can increase your chances of quitting for good.   · Breathe moist air from a hot shower or from a sink filled with hot water to help clear a stuffy nose. · Before you use cough and cold medicines, check the label. These medicines may not be safe for young children or for people with certain health problems. · If the skin around your nose and lips becomes sore, put some petroleum jelly on the area. · To ease coughing:  ¨ Drink fluids to soothe a scratchy throat. ¨ Suck on cough drops or plain hard candy. ¨ Take an over-the-counter cough medicine that contains dextromethorphan to help you get some sleep. Read and follow all instructions on the label. ¨ Raise your head at night with an extra pillow. This may help you rest if coughing keeps you awake. · Take any prescribed medicine exactly as directed. Call your doctor if you think you are having a problem with your medicine. To avoid spreading the flu  · Wash your hands regularly, and keep your hands away from your face. · Stay home from school, work, and other public places until you are feeling better and your fever has been gone for at least 24 hours. The fever needs to have gone away on its own without the help of medicine. · Ask people living with you to talk to their doctors about preventing the flu. They may get antiviral medicine to keep from getting the flu from you. · To prevent the flu in the future, get a flu vaccine every fall. Encourage people living with you to get the vaccine. · Cover your mouth when you cough or sneeze. When should you call for help? Call 911 anytime you think you may need emergency care. For example, call if:  · You have severe trouble breathing. Call your doctor now or seek immediate medical care if:  · You have new or worse trouble breathing. · You seem to be getting much sicker. · You feel very sleepy or confused. · You have a new or higher fever. · You get a new rash.   Watch closely for changes in your health, and be sure to contact your doctor if:  · You begin to get better and then get worse. · You are not getting better after 1 week. Where can you learn more? Go to http://jairon-ryan.info/. Enter Z096 in the search box to learn more about \"Influenza (Flu): Care Instructions. \"  Current as of: March 25, 2017  Content Version: 11.3  © 5229-7353 [employer]. Care instructions adapted under license by [address] (which disclaims liability or warranty for this information). If you have questions about a medical condition or this instruction, always ask your healthcare professional. [place] [address] any warranty or liability for your use of this information. Nausea and Vomiting: Care Instructions  Your Care Instructions    When you are nauseated, you may feel weak and sweaty and notice a lot of saliva in your mouth. Nausea often leads to vomiting. Most of the time you do not need to worry about nausea and vomiting, but they can be signs of other illnesses. Two common causes of nausea and vomiting are stomach flu and food poisoning. Nausea and vomiting from viral stomach flu will usually start to improve within 24 hours. Nausea and vomiting from food poisoning may last from 12 to 48 hours. The doctor has checked you carefully, but problems can develop later. If you notice any problems or new symptoms, get medical treatment right away. Follow-up care is a key part of your treatment and safety. Be sure to make and go to all appointments, and call your doctor if you are having problems. It's also a good idea to know your test results and keep a list of the medicines you take. How can you care for yourself at home? · To prevent dehydration, drink plenty of fluids, enough so that your urine is light yellow or clear like water. Choose water and other caffeine-free clear liquids until you feel better.  If you have kidney, heart, or liver disease and have to limit fluids, talk with your doctor before you increase the amount of fluids you drink. · Rest in bed until you feel better. · When you are able to eat, try clear soups, mild foods, and liquids until all symptoms are gone for 12 to 48 hours. Other good choices include dry toast, crackers, cooked cereal, and gelatin dessert, such as Jell-O. When should you call for help? Call 911 anytime you think you may need emergency care. For example, call if:  · You passed out (lost consciousness). Call your doctor now or seek immediate medical care if:  · You have symptoms of dehydration, such as:  ¨ Dry eyes and a dry mouth. ¨ Passing only a little dark urine. ¨ Feeling thirstier than usual.  · You have new or worsening belly pain. · You have a new or higher fever. · You vomit blood or what looks like coffee grounds. Watch closely for changes in your health, and be sure to contact your doctor if:  · You have ongoing nausea and vomiting. · Your vomiting is getting worse. · Your vomiting lasts longer than 2 days. · You are not getting better as expected. Where can you learn more? Go to http://jairon-ryan.info/. Enter 25 [id number] in the search box to learn more about \"Nausea and Vomiting: Care Instructions. \"  Current as of: March 20, 2017  Content Version: 11.3  © 1517-5824 [employer]herb, [employer]isterbnb. Care instructions adapted under license by [employer] (which disclaims liability or warranty for this information). If you have questions about a medical condition or this instruction, always ask your healthcare professional. [place] [address] any warranty or liability for your use of this information.

## 2017-10-09 VITALS
DIASTOLIC BLOOD PRESSURE: 84 MMHG | HEART RATE: 92 BPM | BODY MASS INDEX: 35.49 KG/M2 | SYSTOLIC BLOOD PRESSURE: 116 MMHG | OXYGEN SATURATION: 96 % | WEIGHT: 234.2 LBS | TEMPERATURE: 99.2 F | HEIGHT: 68 IN | RESPIRATION RATE: 20 BRPM

## 2017-10-09 LAB
QUICKVUE INFLUENZA TEST: POSITIVE
VALID INTERNAL CONTROL?: YES

## 2017-10-20 DIAGNOSIS — I10 ESSENTIAL HYPERTENSION WITH GOAL BLOOD PRESSURE LESS THAN 140/90: ICD-10-CM

## 2017-10-20 RX ORDER — LISINOPRIL 20 MG/1
TABLET ORAL
Qty: 90 TAB | Refills: 0 | Status: SHIPPED | OUTPATIENT
Start: 2017-10-20 | End: 2018-04-08 | Stop reason: SDUPTHER

## 2017-11-19 ENCOUNTER — HOSPITAL ENCOUNTER (OUTPATIENT)
Dept: LAB | Age: 57
Discharge: HOME | End: 2017-11-19

## 2017-11-19 ENCOUNTER — HOSPITAL ENCOUNTER (EMERGENCY)
Age: 57
Discharge: HOME | End: 2017-11-19
Attending: FAMILY MEDICINE

## 2017-11-19 VITALS
OXYGEN SATURATION: 96 % | HEART RATE: 77 BPM | RESPIRATION RATE: 16 BRPM | BODY MASS INDEX: 35.31 KG/M2 | WEIGHT: 233 LBS | TEMPERATURE: 97.2 F | SYSTOLIC BLOOD PRESSURE: 163 MMHG | DIASTOLIC BLOOD PRESSURE: 93 MMHG | HEIGHT: 68 IN

## 2017-11-19 DIAGNOSIS — J02.9 PHARYNGITIS, UNSPECIFIED ETIOLOGY: Primary | ICD-10-CM

## 2017-11-19 PROCEDURE — 87070 CULTURE OTHR SPECIMN AEROBIC: CPT | Performed by: FAMILY MEDICINE

## 2017-11-19 RX ORDER — SAME BUTANEDISULFONATE/BETAINE 400-600 MG
250 POWDER IN PACKET (EA) ORAL 2 TIMES DAILY
COMMUNITY
End: 2018-05-14

## 2017-11-19 NOTE — DISCHARGE INSTRUCTIONS
Sore Throat: Care Instructions  Your Care Instructions    Infection by bacteria or a virus causes most sore throats. Cigarette smoke, dry air, air pollution, allergies, and yelling can also cause a sore throat. Sore throats can be painful and annoying. Fortunately, most sore throats go away on their own. If you have a bacterial infection, your doctor may prescribe antibiotics. Follow-up care is a key part of your treatment and safety. Be sure to make and go to all appointments, and call your doctor if you are having problems. It's also a good idea to know your test results and keep a list of the medicines you take. How can you care for yourself at home? · If your doctor prescribed antibiotics, take them as directed. Do not stop taking them just because you feel better. You need to take the full course of antibiotics. · Gargle with warm salt water once an hour to help reduce swelling and relieve discomfort. Use 1 teaspoon of salt mixed in 1 cup of warm water. · Take an over-the-counter pain medicine, such as acetaminophen (Tylenol), ibuprofen (Advil, Motrin), or naproxen (Aleve). Read and follow all instructions on the label. · Be careful when taking over-the-counter cold or flu medicines and Tylenol at the same time. Many of these medicines have acetaminophen, which is Tylenol. Read the labels to make sure that you are not taking more than the recommended dose. Too much acetaminophen (Tylenol) can be harmful. · Drink plenty of fluids. Fluids may help soothe an irritated throat. Hot fluids, such as tea or soup, may help decrease throat pain. · Use over-the-counter throat lozenges to soothe pain. Regular cough drops or hard candy may also help. These should not be given to young children because of the risk of choking. · Do not smoke or allow others to smoke around you. If you need help quitting, talk to your doctor about stop-smoking programs and medicines.  These can increase your chances of quitting for good. · Use a vaporizer or humidifier to add moisture to your bedroom. Follow the directions for cleaning the machine. When should you call for help? Call your doctor now or seek immediate medical care if:  ? · You have new or worse trouble swallowing. ? · Your sore throat gets much worse on one side. ? Watch closely for changes in your health, and be sure to contact your doctor if you do not get better as expected. Where can you learn more? Go to http://jairon-ryan.info/. Enter [phone] in the search box to learn more about \"Sore Throat: Care Instructions. \"  Current as of: May 12, 2017  Content Version: 11.4  © 6152-7966 [employer]. Care instructions adapted under license by [employer] (which disclaims liability or warranty for this information). If you have questions about a medical condition or this instruction, always ask your healthcare professional. [address] any warranty or liability for your use of this information.

## 2017-11-19 NOTE — UC PROVIDER NOTE
Patient is a 59 y.o. female presenting with sore throat. The history is provided by the patient. Sore Throat    This is a new problem. The current episode started yesterday. The problem has not changed since onset. There has been no fever. Pertinent negatives include no diarrhea, no vomiting, no congestion, no shortness of breath, no stridor, no swollen glands, no trouble swallowing, no stiff neck and no cough. She has had no exposure to strep or mono.         Past Medical History:   Diagnosis Date    Anxiety     Cervical polyp 2014    BENIGN ENDOCERVICAL POLYP    Colon polyps     GERD (gastroesophageal reflux disease)     Hypertension     LGSIL (low grade squamous intraepithelial lesion) on Pap smear 2006    s/p colposcopy, PAP nl since then    Menopausal syndrome (hot flashes) 9/9/2013    Obesity     Osteoarthritis     Osteopenia 9/27/2016    Primary osteoarthritis of knees, bilateral 4/13/2016    Restless leg syndrome 9/9/2013        Past Surgical History:   Procedure Laterality Date    HX BUNIONECTOMY Left 2008    HX COLONOSCOPY  2009    GI- Dr Beau Chapa    HX COLONOSCOPY  7/30/12    polyp, irregularities, repeat 3 yrs    HX COLONOSCOPY  9/24/15    normal    HX COLPOSCOPY  2006    HX HEENT      DENTAL IMPLANT    HX KNEE ARTHROSCOPY Left 2013    (torn meniscus)    HX KNEE ARTHROSCOPY Right 3/14    HX KNEE REPLACEMENT Bilateral 04/13/2016    HX ORTHOPAEDIC Left 2015    BUNIONECTOMY LEFT FOOT    HX ORTHOPAEDIC Left 2015    LEFT FOOT SURGERY - PLATES, SCREWS, PINS    HX OTHER SURGICAL Left 5/8/15    LEFT FOOT LAPIDUS ARTHRODESIS, LEFT 2ND, 3RD MET CUNEIFORM FUSION, GASTROCNEMIS RECESSION, STJ ARTHROERESIS (GENERAL WITH POPLITEAL BLOCK)         Family History   Problem Relation Age of Onset    Parkinsonism Father     Hypertension Father     Arthritis-osteo Father     Cancer Father      PROSTATE-TREATED WITH ESTROGEN    Cancer Sister      BREAST    Breast Cancer Sister 48    Glaucoma Mother     Heart Disease Mother      multiple MI's in her 52's    Breast Cancer Daughter 48    Anesth Problems Neg Hx         Social History     Social History    Marital status:      Spouse name: N/A    Number of children: N/A    Years of education: N/A     Occupational History    Not on file. Social History Main Topics    Smoking status: Never Smoker    Smokeless tobacco: Never Used    Alcohol use 3.6 oz/week     6 Standard drinks or equivalent per week    Drug use: No    Sexual activity: No     Other Topics Concern    Not on file     Social History Narrative    ** Merged History Encounter **                     ALLERGIES: Review of patient's allergies indicates no known allergies. Review of Systems   Constitutional: Negative for chills and fever. HENT: Positive for sore throat. Negative for congestion and trouble swallowing. Respiratory: Negative for cough, shortness of breath and stridor. Gastrointestinal: Negative for diarrhea and vomiting. Vitals:    11/19/17 0959   BP: (!) 163/93   Pulse: 77   Resp: 16   Temp: 97.2 °F (36.2 °C)   SpO2: 96%   Weight: 105.7 kg (233 lb)   Height: 5' 8\" (1.727 m)       Physical Exam   Constitutional: She is oriented to person, place, and time. She appears well-developed and well-nourished. HENT:   Right Ear: External ear normal.   Left Ear: External ear normal.   Mouth/Throat: No oropharyngeal exudate. Eyes: Conjunctivae and EOM are normal.   Cardiovascular: Normal rate and regular rhythm. Pulmonary/Chest: Effort normal and breath sounds normal.   Neurological: She is alert and oriented to person, place, and time. Skin: Skin is warm and dry. Psychiatric: She has a normal mood and affect. Her behavior is normal. Judgment and thought content normal.   Nursing note and vitals reviewed.       MDM     Differential Diagnosis; Clinical Impression; Plan:     CLINICAL IMPRESSION:  Pharyngitis, unspecified etiology  (primary encounter diagnosis)    Plan:  1. Warm saline gargles  2.   3.   Amount and/or Complexity of Data Reviewed:   Clinical lab tests:  Ordered and reviewed  Risk of Significant Complications, Morbidity, and/or Mortality:   Presenting problems: Moderate  Diagnostic procedures: Moderate  Management options:   Moderate  Progress:   Patient progress:  Stable      Procedures

## 2017-11-20 ENCOUNTER — PATIENT OUTREACH (OUTPATIENT)
Dept: OTHER | Age: 57
End: 2017-11-20

## 2017-11-20 NOTE — PROGRESS NOTES
Patient on [facility] discharge report dated 11/19. Left message on voicemail. Will attempt to contact again. Need to complete post-discharge assessment.

## 2017-11-21 ENCOUNTER — PATIENT OUTREACH (OUTPATIENT)
Dept: OTHER | Age: 57
End: 2017-11-21

## 2017-11-21 LAB
BACTERIA SPEC CULT: NORMAL
SERVICE CMNT-IMP: NORMAL

## 2017-12-31 DIAGNOSIS — N95.1 MENOPAUSAL SYNDROME (HOT FLASHES): ICD-10-CM

## 2017-12-31 RX ORDER — PAROXETINE 10 MG/1
TABLET, FILM COATED ORAL
Qty: 90 TAB | Refills: 0 | Status: SHIPPED | OUTPATIENT
Start: 2017-12-31 | End: 2018-03-27 | Stop reason: SDUPTHER

## 2018-01-13 DIAGNOSIS — M15.9 GENERALIZED OSTEOARTHRITIS: ICD-10-CM

## 2018-01-14 RX ORDER — CELECOXIB 200 MG/1
CAPSULE ORAL
Qty: 90 CAP | Refills: 0 | Status: SHIPPED | OUTPATIENT
Start: 2018-01-14 | End: 2018-04-13 | Stop reason: SDUPTHER

## 2018-01-22 ENCOUNTER — OFFICE VISIT (OUTPATIENT)
Dept: FAMILY MEDICINE CLINIC | Age: 58
End: 2018-01-22

## 2018-01-22 VITALS
DIASTOLIC BLOOD PRESSURE: 73 MMHG | RESPIRATION RATE: 18 BRPM | HEIGHT: 68 IN | TEMPERATURE: 98.3 F | WEIGHT: 237 LBS | HEART RATE: 76 BPM | BODY MASS INDEX: 35.92 KG/M2 | SYSTOLIC BLOOD PRESSURE: 110 MMHG | OXYGEN SATURATION: 98 %

## 2018-01-22 DIAGNOSIS — R68.89 FLU-LIKE SYMPTOMS: ICD-10-CM

## 2018-01-22 DIAGNOSIS — B34.9 VIRAL SYNDROME: Primary | ICD-10-CM

## 2018-01-22 LAB
FLUAV+FLUBV AG NOSE QL IA.RAPID: NEGATIVE POS/NEG
FLUAV+FLUBV AG NOSE QL IA.RAPID: NEGATIVE POS/NEG
S PYO AG THROAT QL: NEGATIVE
VALID INTERNAL CONTROL?: YES
VALID INTERNAL CONTROL?: YES

## 2018-01-22 NOTE — PROGRESS NOTES
Chief Complaint   Patient presents with    Cold Symptoms     cough, dizziness and sore throat for 2 days

## 2018-01-23 NOTE — PROGRESS NOTES
HISTORY OF PRESENT ILLNESS  Yue Farias is a 59 y.o. female. HPI   This lady complains of a 2 day hx of a mild cough that is non productive,nasal congestion, mild post nasal drainage. She wants to make sure she does not have the flu as someone in her office was recently diagnosed with the flu. Eating and drinking OK    Review of Systems   Constitutional: Negative for chills and fever. HENT: Positive for congestion. Negative for sore throat. Respiratory: Positive for cough. Cardiovascular: Negative for chest pain. Gastrointestinal: Negative for nausea and vomiting. Musculoskeletal: Negative for myalgias. Physical Exam   Constitutional: She appears well-developed and well-nourished. No distress. HENT:   Right Ear: External ear normal.   Left Ear: External ear normal.   Nose: Nose normal.   Mouth/Throat: Oropharynx is clear and moist. No oropharyngeal exudate. Eyes: Pupils are equal, round, and reactive to light. Neck: Normal range of motion. Neck supple. Cardiovascular: Normal rate, regular rhythm and normal heart sounds. No murmur heard. Pulmonary/Chest: Effort normal and breath sounds normal. No respiratory distress. She has no wheezes. Abdominal: There is no tenderness. Skin: She is not diaphoretic. ASSESSMENT and PLAN    ICD-10-CM ICD-9-CM    1. Viral syndrome B34.9 079.99    2.  Flu-like symptoms R68.89 780.99 AMB POC JERI INFLUENZA A/B TEST      AMB POC RAPID STREP A   rest, fluids  Follow up if not better  Tessalon perles prn cough  Precautions given

## 2018-03-27 DIAGNOSIS — N95.1 MENOPAUSAL SYNDROME (HOT FLASHES): ICD-10-CM

## 2018-03-27 RX ORDER — PAROXETINE 10 MG/1
TABLET, FILM COATED ORAL
Qty: 90 TAB | Refills: 0 | Status: SHIPPED | OUTPATIENT
Start: 2018-03-27 | End: 2018-04-15 | Stop reason: SDUPTHER

## 2018-04-08 DIAGNOSIS — I10 ESSENTIAL HYPERTENSION WITH GOAL BLOOD PRESSURE LESS THAN 140/90: ICD-10-CM

## 2018-04-08 RX ORDER — LISINOPRIL 20 MG/1
TABLET ORAL
Qty: 90 TAB | Refills: 0 | Status: SHIPPED | OUTPATIENT
Start: 2018-04-08 | End: 2018-07-06 | Stop reason: SDUPTHER

## 2018-04-13 DIAGNOSIS — M15.9 GENERALIZED OSTEOARTHRITIS: ICD-10-CM

## 2018-04-13 RX ORDER — CELECOXIB 200 MG/1
CAPSULE ORAL
Qty: 90 CAP | Refills: 0 | Status: SHIPPED | OUTPATIENT
Start: 2018-04-13 | End: 2018-07-10 | Stop reason: SDUPTHER

## 2018-04-15 DIAGNOSIS — N95.1 MENOPAUSAL SYNDROME (HOT FLASHES): ICD-10-CM

## 2018-04-16 RX ORDER — PAROXETINE 10 MG/1
TABLET, FILM COATED ORAL
Qty: 90 TAB | Refills: 0 | Status: SHIPPED | OUTPATIENT
Start: 2018-04-16 | End: 2018-05-14 | Stop reason: SDUPTHER

## 2018-05-14 ENCOUNTER — OFFICE VISIT (OUTPATIENT)
Dept: INTERNAL MEDICINE CLINIC | Age: 58
End: 2018-05-14

## 2018-05-14 VITALS
SYSTOLIC BLOOD PRESSURE: 110 MMHG | TEMPERATURE: 98.1 F | WEIGHT: 240 LBS | HEIGHT: 68 IN | BODY MASS INDEX: 36.37 KG/M2 | OXYGEN SATURATION: 98 % | HEART RATE: 82 BPM | RESPIRATION RATE: 12 BRPM | DIASTOLIC BLOOD PRESSURE: 72 MMHG

## 2018-05-14 DIAGNOSIS — I10 HYPERTENSION, ESSENTIAL: ICD-10-CM

## 2018-05-14 DIAGNOSIS — E66.01 SEVERE OBESITY (BMI 35.0-39.9) WITH COMORBIDITY (HCC): ICD-10-CM

## 2018-05-14 DIAGNOSIS — R73.03 PRE-DIABETES: ICD-10-CM

## 2018-05-14 DIAGNOSIS — F41.9 ANXIETY: ICD-10-CM

## 2018-05-14 DIAGNOSIS — N95.1 MENOPAUSAL SYNDROME (HOT FLASHES): Primary | ICD-10-CM

## 2018-05-14 DIAGNOSIS — Z13.220 LIPID SCREENING: ICD-10-CM

## 2018-05-14 RX ORDER — ALPRAZOLAM 0.5 MG/1
0.5 TABLET ORAL
Qty: 10 TAB | Refills: 0 | Status: SHIPPED | OUTPATIENT
Start: 2018-05-14 | End: 2019-06-05 | Stop reason: SDUPTHER

## 2018-05-14 RX ORDER — PAROXETINE 10 MG/1
TABLET, FILM COATED ORAL
Qty: 90 TAB | Refills: 0
Start: 2018-05-14 | End: 2018-06-01 | Stop reason: SDUPTHER

## 2018-05-14 NOTE — PROGRESS NOTES
Anika Haji is a 59 y.o. female who was seen in clinic today (5/14/2018). Assessment & Plan:   Diagnoses and all orders for this visit:    1. Menopausal syndrome (hot flashes)- uncontrolled, is tried multiple prescription OTC meds without success, will increase dose of SSRI, however weight gain is concerning to be related to meds so will monitor closely. If no benefits will have her see endocrinology. Reviewed pros/cons to hormone replacement and would defer at this time due to family history for breast cancer.  -     PARoxetine (PAXIL) 10 mg tablet; TAKE 2 TAB BY MOUTH DAILY. -     CBC W/O DIFF  -     TSH 3RD GENERATION    2. Anxiety- well controlled, is situational,  not reviewed, meds refilled, she has follow-up next month and will readdress at that time  -     ALPRAZolam (XANAX) 0.5 mg tablet; Take 1 Tab by mouth every twelve (12) hours as needed. 3. Hypertension, essential- well controlled, continue current treatment pending review of labs   -     METABOLIC PANEL, COMPREHENSIVE    4. Severe obesity (BMI 35.0-39. 9) with comorbidity ([address]- poorly controlled, needs improvement, worsening, and is concerning that weight gain is related to SSRI. I have reviewed/discussed the above normal BMI with the patient. I have recommended the following interventions: encourage exercise and lifestyle education regarding diet. She will weigh herself weekly and notify me if increases. 5. Pre-diabetes- she has f/u next month, will check labs prior to appointment  -     METABOLIC PANEL, COMPREHENSIVE  -     HEMOGLOBIN A1C WITH EAG    6. Lipid screening  -     LIPID PANEL         Follow-up Disposition:  Return if symptoms worsen or fail to improve. Subjective:   Sanya Kristi was seen today for Hypertension; GERD; High Blood Sugar; and Anxiety    Endocrine Review  She is here to talk about hot flashes. She reports symptoms are getting worse. Last seen in clinic in '16.   She did not get relief with Gabapentin and has been on Paxil since last visit. She reports it may have helped slightly. She reports 10-12 hot flashes per day, lastingfor a few minutes. Cardiovascular Review  The patient has hypertension and obesity. Since last visit: weight loss. She reports taking medications as instructed, no medication side effects noted. Diet and Lifestyle: not attempting to follow a low fat, low cholesterol diet, not attempting to follow a low sodium diet, sedentary. Labs: reviewed and discussed with patient. GI Review  She has a history of GERD. She denies: abdominal bloating, heartburn, midepigastric pain and nausea. She has identified the following triggers: fried. Medical therapy currently involves Prilosec. Pain Review  She presents do to generalized OA pain. It is in the back/neck and both legs. She describes the pain as soreness & stiffness. It is intermittent. She is using meds daily w/ good relief. Brief Labs:   No results found for: NA, K, CREA, CREATEXT, CHOL, HDL, LDLC, LDL, LDLCEXT, TRIGL, HBA1C, RAE6VRXY, TSH, TYA1MONS, TSHEXT       Prior to Admission medications    Medication Sig Start Date End Date Taking? Authorizing Provider   PARoxetine (PAXIL) 10 mg tablet TAKE 1 TAB BY MOUTH DAILY. 4/16/18  Yes Bright Holman MD   celecoxib (CELEBREX) 200 mg capsule TAKE 1 CAP BY MOUTH DAILY. 4/13/18  Yes Bright Holman MD   lisinopril (PRINIVIL, ZESTRIL) 20 mg tablet TAKE 1 TABLET DAILY 4/8/18  Yes Bright Holman MD   cholecalciferol, vitamin D3, (VITAMIN D3) 2,000 unit tab Take 2,000 Units by mouth daily. Yes Historical Provider   omeprazole (PRILOSEC) 40 mg capsule TAKE 1 CAPSULE DAILY  Patient taking differently: TAKE 1 CAPSULE by mouth DAILY as needed 9/7/16  Yes Bright Holman MD   ALPRAZolam Luiza Galvin) 0.5 mg tablet Take 1 Tab by mouth every twelve (12) hours as needed.  8/19/16  Yes Bright Holman MD   acetaminophen (TYLENOL) 500 mg tablet Take 1 Tab by mouth every four (4) hours (while awake). Indications: PAIN  Patient taking differently: Take 500 mg by mouth every six (6) hours as needed. Indications: Pain 4/15/16  Yes Lay Boyer MD   MULTIVITAMIN PO Take 1 Tab by mouth daily. 4/27/11  Yes Historical Provider          No Known Allergies        Review of Systems   Constitutional: Negative for malaise/fatigue and weight loss. Respiratory: Negative for cough and shortness of breath. Cardiovascular: Negative for chest pain, palpitations and leg swelling. Gastrointestinal: Negative for abdominal pain, constipation, diarrhea, heartburn, nausea and vomiting. Genitourinary: Negative for frequency. Musculoskeletal: Positive for joint pain and neck pain. Negative for myalgias. Skin: Negative for rash. Neurological: Negative for tingling, sensory change, focal weakness and headaches. Psychiatric/Behavioral: Negative for depression. The patient is not nervous/anxious and does not have insomnia. Objective:   Physical Exam   Constitutional: She appears well-developed. No distress. obese   HENT:   Mouth/Throat: Mucous membranes are normal.   Eyes: Conjunctivae and lids are normal. No scleral icterus. Neck: Neck supple. No thyromegaly present. Cardiovascular: Regular rhythm and normal heart sounds. No murmur heard. Pulmonary/Chest: Effort normal and breath sounds normal. She has no wheezes. She has no rales. Abdominal: Soft. Bowel sounds are normal. She exhibits no mass. There is no hepatosplenomegaly. There is no tenderness. Musculoskeletal: She exhibits no edema. Lymphadenopathy:     She has no cervical adenopathy. Skin: No rash noted. Psychiatric: She has a normal mood and affect.  Her behavior is normal.         Visit Vitals    /72    Pulse 82    Temp 98.1 °F (36.7 °C) (Oral)    Resp 12    Ht 5' 8\" (1.727 m)    Wt 240 lb (108.9 kg)    LMP 11/25/2012    SpO2 98%    BMI 36.49 kg/m2         Disclaimer:  Advised her to call back or return to office if symptoms worsen/change/persist.  Discussed expected course/resolution/complications of diagnosis in detail with patient. Medication risks/benefits/costs/interactions/alternatives discussed with patient. She was given an after visit summary which includes diagnoses, current medications, & vitals. She expressed understanding with the diagnosis and plan. Aspects of this note may have been generated using voice recognition software. Despite editing, there may be some syntax errors.        Bright Holman MD

## 2018-06-01 ENCOUNTER — OFFICE VISIT (OUTPATIENT)
Dept: INTERNAL MEDICINE CLINIC | Age: 58
End: 2018-06-01

## 2018-06-01 ENCOUNTER — HOSPITAL ENCOUNTER (OUTPATIENT)
Dept: LAB | Age: 58
Discharge: HOME | End: 2018-06-01
Payer: COMMERCIAL

## 2018-06-01 VITALS
DIASTOLIC BLOOD PRESSURE: 66 MMHG | RESPIRATION RATE: 14 BRPM | HEIGHT: 67 IN | OXYGEN SATURATION: 97 % | HEART RATE: 84 BPM | SYSTOLIC BLOOD PRESSURE: 118 MMHG | TEMPERATURE: 99.2 F | WEIGHT: 240 LBS | BODY MASS INDEX: 37.67 KG/M2

## 2018-06-01 DIAGNOSIS — Z71.89 ACP (ADVANCE CARE PLANNING): ICD-10-CM

## 2018-06-01 DIAGNOSIS — E66.01 SEVERE OBESITY (BMI 35.0-39.9) WITH COMORBIDITY (HCC): ICD-10-CM

## 2018-06-01 DIAGNOSIS — Z00.00 ROUTINE PHYSICAL EXAMINATION: Primary | ICD-10-CM

## 2018-06-01 DIAGNOSIS — R73.03 PRE-DIABETES: ICD-10-CM

## 2018-06-01 DIAGNOSIS — N95.1 MENOPAUSAL SYNDROME (HOT FLASHES): ICD-10-CM

## 2018-06-01 LAB
ALBUMIN SERPL-MCNC: 4 G/DL (ref 3.5–5.5)
ALBUMIN/GLOB SERPL: 1.1 {RATIO} (ref 1.2–2.2)
ALP SERPL-CCNC: 121 IU/L (ref 39–117)
ALT SERPL-CCNC: 37 IU/L (ref 0–32)
AST SERPL-CCNC: 35 IU/L (ref 0–40)
BILIRUB SERPL-MCNC: 0.3 MG/DL (ref 0–1.2)
BUN SERPL-MCNC: 13 MG/DL (ref 6–24)
BUN/CREAT SERPL: 19 (ref 9–23)
CALCIUM SERPL-MCNC: 9 MG/DL (ref 8.7–10.2)
CHLORIDE SERPL-SCNC: 100 MMOL/L (ref 96–106)
CHOLEST SERPL-MCNC: 218 MG/DL (ref 100–199)
CO2 SERPL-SCNC: 26 MMOL/L (ref 18–29)
CREAT SERPL-MCNC: 0.69 MG/DL (ref 0.57–1)
ERYTHROCYTE [DISTWIDTH] IN BLOOD BY AUTOMATED COUNT: 14.4 % (ref 12.3–15.4)
EST. AVERAGE GLUCOSE BLD GHB EST-MCNC: 128 MG/DL
GFR SERPLBLD CREATININE-BSD FMLA CKD-EPI: 110 ML/MIN/1.73
GFR SERPLBLD CREATININE-BSD FMLA CKD-EPI: 96 ML/MIN/1.73
GLOBULIN SER CALC-MCNC: 3.7 G/DL (ref 1.5–4.5)
GLUCOSE SERPL-MCNC: 98 MG/DL (ref 65–99)
HBA1C MFR BLD: 6.1 % (ref 4.8–5.6)
HCT VFR BLD AUTO: 37.9 % (ref 34–46.6)
HDLC SERPL-MCNC: 47 MG/DL
HGB BLD-MCNC: 12.4 G/DL (ref 11.1–15.9)
LDLC SERPL CALC-MCNC: 124 MG/DL (ref 0–99)
MCH RBC QN AUTO: 28.7 PG (ref 26.6–33)
MCHC RBC AUTO-ENTMCNC: 32.7 G/DL (ref 31.5–35.7)
MCV RBC AUTO: 88 FL (ref 79–97)
PLATELET # BLD AUTO: 367 X10E3/UL (ref 150–379)
POTASSIUM SERPL-SCNC: 4.5 MMOL/L (ref 3.5–5.2)
PROT SERPL-MCNC: 7.7 G/DL (ref 6–8.5)
RBC # BLD AUTO: 4.32 X10E6/UL (ref 3.77–5.28)
SODIUM SERPL-SCNC: 138 MMOL/L (ref 134–144)
TRIGL SERPL-MCNC: 235 MG/DL (ref 0–149)
TSH SERPL DL<=0.005 MIU/L-ACNC: 1.21 UIU/ML (ref 0.45–4.5)
VLDLC SERPL CALC-MCNC: 47 MG/DL (ref 5–40)
WBC # BLD AUTO: 6.1 X10E3/UL (ref 3.4–10.8)

## 2018-06-01 PROCEDURE — 88175 CYTOPATH C/V AUTO FLUID REDO: CPT | Performed by: INTERNAL MEDICINE

## 2018-06-01 PROCEDURE — 87624 HPV HI-RISK TYP POOLED RSLT: CPT | Performed by: INTERNAL MEDICINE

## 2018-06-01 RX ORDER — PAROXETINE HYDROCHLORIDE 40 MG/1
TABLET, FILM COATED ORAL
Qty: 90 TAB | Refills: 1 | Status: SHIPPED | OUTPATIENT
Start: 2018-06-01 | End: 2018-12-10 | Stop reason: SDUPTHER

## 2018-06-01 NOTE — PROGRESS NOTES
Anika Haji is a 59 y.o. female who was seen in clinic today (6/1/2018) for a full physical.          Assessment & Plan:   Diagnoses and all orders for this visit:    1. Routine physical examination  -     PAP IG, APTIMA HPV AND RFX 16/18,45 ([id number])  -     Previous labs reviewed & discussed with her. 10 yr CVD: 4.1%  -     Cervical abnormality present at last PAP, looks stable    2. Menopausal syndrome (hot flashes)- improved, will try increasing from 20mg to 40mg and reassessing, reviewed med side effects  -     PARoxetine (PAXIL) 40 mg tablet; TAKE 1 TAB BY MOUTH DAILY. 3. ACP (advance care planning)  -     FULL CODE    4. Severe obesity (BMI 35.0-39. 9) with comorbidity ([facility] [address]- stable, needs improvement, I have reviewed/discussed the above normal BMI with the patient. I have recommended the following interventions: encourage exercise and lifestyle education regarding diet. Reviewed medication options & Dr Sukhi Valdivia weight loss clinic. 5. Pre-diabetes- improved, continue to work on #4. Follow-up Disposition:  Return in about 1 year (around 6/1/2019) for FULL PHYSICAL - 30 minutes. ------------------------------------------------------------------------------------------    Subjective:   Sanya Berry is here today for a full physical.      End of Life Planning  This was discussed with her today and she has an advanced directive - a copy HAS NOT been provided. Reviewed DNR/DNI and patient is not interested. Health Maintenance  Immunizations:    Influenza: up to date. Tetanus: up to date. Shingles: due for Shingrix - she declined. Pneumonia: n/a. Cancer screening:    Cervical: reviewed guidelines, not UTD - will do today. Breast: reviewed guidelines, reviewed SBE with her, UTD. Colon: up to date.         Patient Care Team:  Liam Nixon MD as PCP - General (Internal Medicine)  Navin Marks MD (Gastroenterology)  Isis Akers MD (Orthopedic Surgery)  Lucila Jean-Baptiste MD as Physician (Otolaryngology)  Paco Leahy RN as Benefits Care Manager      The following sections were reviewed & updated as appropriate: PMH, PSH, FH, and SH. Patient Active Problem List   Diagnosis Code    Hypertension, essential I10    Colon polyps K63.5    Menopausal syndrome (hot flashes) N95.1    Restless leg syndrome G25.81    Anxiety F41.9    Cervical polyp N84.1    Generalized osteoarthritis of multiple sites M15.9    Pre-diabetes R73.03    Osteopenia M85.80    Severe obesity (BMI 35.0-39. 9) with comorbidity ([address] E66.01          Prior to Admission medications    Medication Sig Start Date End Date Taking? Authorizing Provider   ALPRAZolam [place] S[place]) 0.5 mg tablet Take 1 Tab by mouth every twelve (12) hours as needed. 5/14/18  Yes Kishor Bocanegra MD   PARoxetine (PAXIL) 10 mg tablet TAKE 2 TAB BY MOUTH DAILY. 5/14/18  Yes Kishor Bocanegra MD   celecoxib (CELEBREX) 200 mg capsule TAKE 1 CAP BY MOUTH DAILY. 4/13/18  Yes Kishor Bocanegra MD   lisinopril (PRINIVIL, ZESTRIL) 20 mg tablet TAKE 1 TABLET DAILY 4/8/18  Yes Kishor Bocanegra MD   cholecalciferol, vitamin D3, (VITAMIN D3) 2,000 unit tab Take 2,000 Units by mouth daily. Yes Historical Provider   omeprazole (PRILOSEC) 40 mg capsule TAKE 1 CAPSULE DAILY  Patient taking differently: TAKE 1 CAPSULE by mouth DAILY as needed 9/7/16  Yes Kishor Bocanegra MD   acetaminophen (TYLENOL) 500 mg tablet Take 1 Tab by mouth every four (4) hours (while awake). Indications: PAIN  Patient taking differently: Take 500 mg by mouth every six (6) hours as needed. Indications: Pain 4/15/16  Yes Meagan Martinez MD   MULTIVITAMIN PO Take 1 Tab by mouth daily. 4/27/11  Yes Historical Provider          No Known Allergies         Review of Systems   Constitutional: Negative for chills and fever. Hot flashes improved w/ increase in SSRI   Respiratory: Negative for cough and shortness of breath.     Cardiovascular: Negative for chest pain and palpitations. Gastrointestinal: Negative for abdominal pain, blood in stool, constipation, diarrhea, heartburn, nausea and vomiting. Musculoskeletal: Negative for joint pain and myalgias. Neurological: Negative for tingling, focal weakness and headaches. Endo/Heme/Allergies: Does not bruise/bleed easily. Psychiatric/Behavioral: Negative for depression. The patient is not nervous/anxious and does not have insomnia. Objective:   Physical Exam   Constitutional: She appears well-developed. No distress. obese   HENT:   Mouth/Throat: Mucous membranes are normal.   Eyes: Conjunctivae and lids are normal. No scleral icterus. Neck: Neck supple. No thyromegaly present. Cardiovascular: Regular rhythm and normal heart sounds. No murmur heard. Pulmonary/Chest: Effort normal and breath sounds normal. She has no wheezes. She has no rales. Abdominal: Soft. Bowel sounds are normal. She exhibits no mass. There is no hepatosplenomegaly. There is no tenderness. Genitourinary: There is no rash, tenderness, lesion or injury on the right labia. There is no rash, tenderness, lesion or injury on the left labia. Cervix exhibits friability. Cervix exhibits no motion tenderness and no discharge. No erythema or tenderness in the vagina. No foreign body in the vagina. No vaginal discharge found. Genitourinary Comments: Chaperone: RACHEL Henriquez LPN     Musculoskeletal: She exhibits no edema. Lymphadenopathy:     She has no cervical adenopathy. Skin: No rash noted. Psychiatric: She has a normal mood and affect.  Her behavior is normal.          Visit Vitals    /66    Pulse 84    Temp 99.2 °F (37.3 °C) (Oral)    Resp 14    Ht 5' 7.4\" (1.712 m)    Wt 240 lb (108.9 kg)    LMP 11/25/2012    SpO2 97%    BMI 37.14 kg/m2          Advised her to call back or return to office if symptoms worsen/change/persist.  Discussed expected course/resolution/complications of diagnosis in detail with patient. Medication risks/benefits/costs/interactions/alternatives discussed with patient. She was given an after visit summary which includes diagnoses, current medications, & vitals. She expressed understanding with the diagnosis and plan. Aspects of this note may have been generated using voice recognition software. Despite editing, there may be some syntax errors.        Bandar King MD

## 2018-06-01 NOTE — PATIENT INSTRUCTIONS
Well Visit, Women 48 to 72: Care Instructions  Your Care Instructions    Physical exams can help you stay healthy. Your doctor has checked your overall health and may have suggested ways to take good care of yourself. He or she also may have recommended tests. At home, you can help prevent illness with healthy eating, regular exercise, and other steps. Follow-up care is a key part of your treatment and safety. Be sure to make and go to all appointments, and call your doctor if you are having problems. It's also a good idea to know your test results and keep a list of the medicines you take. How can you care for yourself at home? · Reach and stay at a healthy weight. This will lower your risk for many problems, such as obesity, diabetes, heart disease, and high blood pressure. · Get at least 30 minutes of exercise on most days of the week. Walking is a good choice. You also may want to do other activities, such as running, swimming, cycling, or playing tennis or team sports. · Do not smoke. Smoking can make health problems worse. If you need help quitting, talk to your doctor about stop-smoking programs and medicines. These can increase your chances of quitting for good. · Protect your skin from too much sun. When you're outdoors from 10 a.m. to 4 p.m., stay in the shade or cover up with clothing and a hat with a wide brim. Wear sunglasses that block UV rays. Even when it's cloudy, put broad-spectrum sunscreen (SPF 30 or higher) on any exposed skin. · See a dentist one or two times a year for checkups and to have your teeth cleaned. · Wear a seat belt in the car. · Limit alcohol to 1 drink a day. Too much alcohol can cause health problems. Follow your doctor's advice about when to have certain tests. These tests can spot problems early. · Cholesterol.  Your doctor will tell you how often to have this done based on your age, family history, or other things that can increase your risk for heart attack and stroke. · Blood pressure. Have your blood pressure checked during a routine doctor visit. Your doctor will tell you how often to check your blood pressure based on your age, your blood pressure results, and other factors. · Mammogram. Ask your doctor how often you should have a mammogram, which is an X-ray of your breasts. A mammogram can spot breast cancer before it can be felt and when it is easiest to treat. · Pap test and pelvic exam. Ask your doctor how often you should have a Pap test. You may not need to have a Pap test as often as you used to. · Vision. Have your eyes checked every year or two or as often as your doctor suggests. Some experts recommend that you have yearly exams for glaucoma and other age-related eye problems starting at age 48. · Hearing. Tell your doctor if you notice any change in your hearing. You can have tests to find out how well you hear. · Diabetes. Ask your doctor whether you should have tests for diabetes. · Colon cancer. You should begin tests for colon cancer at age 48. You may have one of several tests. Your doctor will tell you how often to have tests based on your age and risk. Risks include whether you already had a precancerous polyp removed from your colon or whether your parents, sisters and brothers, or children have had colon cancer. · Thyroid disease. Talk to your doctor about whether to have your thyroid checked as part of a regular physical exam. Women have an increased chance of a thyroid problem. · Osteoporosis. You should begin tests for bone density at age 72. If you are younger than 72, ask your doctor whether you have factors that may increase your risk for this disease. You may want to have this test before age 72. · Heart attack and stroke risk. At least every 4 to 6 years, you should have your risk for heart attack and stroke assessed.  Your doctor uses factors such as your age, blood pressure, cholesterol, and whether you smoke or have diabetes to show what your risk for a heart attack or stroke is over the next 10 years. When should you call for help? Watch closely for changes in your health, and be sure to contact your doctor if you have any problems or symptoms that concern you. Where can you learn more? Go to http://jairon-ryan.info/. Enter E021 in the search box to learn more about \"Well Visit, Women 50 to 72: Care Instructions. \"  Current as of: May 12, 2017  Content Version: 11.4  © 5398-1834 Healthwise, Incorporated. Care instructions adapted under license by [employer] (which disclaims liability or warranty for this information). If you have questions about a medical condition or this instruction, always ask your healthcare professional. [address] any warranty or liability for your use of this information.

## 2018-06-01 NOTE — ACP (ADVANCE CARE PLANNING)
Advance Care Planning (ACP) Provider Note - Comprehensive     Date of ACP Conversation: 06/01/18  Persons included in Conversation:  patient  Length of ACP Conversation in minutes: <16 minutes (Non-Billable)    Authorized Decision Maker (if patient is incapable of making informed decisions): This person is: Healthcare Agent/Medical Power of  under Advance Directive      She has an advanced directive - a copy HAS NOT been provided. Reviewed DNR/DNI and patient is not interested. General ACP for ALL Patients with Decision Making Capacity:  Importance of advance care planning, including choosing a healthcare agent to communicate patient's healthcare decisions if patient lost the ability to make decisions, such as after a sudden illness or accident  Understanding of the healthcare agent role was assessed and information provided  Opportunity offered to explore how cultural, Jew, spiritual, or personal beliefs would affect decisions for future care  Exploration of values, goals, and preferences if recovery is not expected, even with continued medical treatment in the event of: Imminent death or severe, permanent brain injury    For Serious or Chronic Illness:  Understanding of CPR, goals and expected outcomes, benefits and burdens discussed.   Understanding of medical condition  Information on CPR success rates provided (e.g. for CPR in hospital, survival to d/c at two weeks is 22%, for chronically ill or elderly/frail survival is less than 3%)    Interventions Provided:  Recommended communicating the plan and making copies for the healthcare agent, personal physician, and others as appropriate (e.g., health system)  Recommended review of completed ACP document annually or upon change in health status

## 2018-06-02 NOTE — PROGRESS NOTES
Results released to patient via On[employer]. All labs are stable or at goal for her. Labs reviewed w/ her at her visit. 10yr CVD is 4.1%. LFTs improved. A1c and FBS improved.

## 2018-07-06 DIAGNOSIS — I10 ESSENTIAL HYPERTENSION WITH GOAL BLOOD PRESSURE LESS THAN 140/90: ICD-10-CM

## 2018-07-06 RX ORDER — LISINOPRIL 20 MG/1
TABLET ORAL
Qty: 90 TAB | Refills: 1 | Status: SHIPPED | OUTPATIENT
Start: 2018-07-06 | End: 2018-12-10 | Stop reason: SDUPTHER

## 2018-07-10 DIAGNOSIS — M15.9 GENERALIZED OSTEOARTHRITIS: ICD-10-CM

## 2018-07-10 RX ORDER — CELECOXIB 200 MG/1
CAPSULE ORAL
Qty: 90 CAP | Refills: 1 | Status: SHIPPED | OUTPATIENT
Start: 2018-07-10 | End: 2018-12-10 | Stop reason: SDUPTHER

## 2018-07-21 ENCOUNTER — OFFICE VISIT (OUTPATIENT)
Dept: URGENT CARE | Age: 58
End: 2018-07-21

## 2018-07-21 VITALS
RESPIRATION RATE: 18 BRPM | DIASTOLIC BLOOD PRESSURE: 77 MMHG | TEMPERATURE: 98.8 F | SYSTOLIC BLOOD PRESSURE: 132 MMHG | WEIGHT: 238 LBS | BODY MASS INDEX: 36.07 KG/M2 | OXYGEN SATURATION: 97 % | HEIGHT: 68 IN | HEART RATE: 83 BPM

## 2018-07-21 DIAGNOSIS — J06.9 VIRAL UPPER RESPIRATORY TRACT INFECTION: Primary | ICD-10-CM

## 2018-07-21 LAB
S PYO AG THROAT QL: NEGATIVE
VALID INTERNAL CONTROL?: YES

## 2018-07-21 RX ORDER — METHYLPREDNISOLONE 4 MG/1
TABLET ORAL
Qty: 1 DOSE PACK | Refills: 0 | Status: SHIPPED | OUTPATIENT
Start: 2018-07-21 | End: 2018-10-02 | Stop reason: ALTCHOICE

## 2018-07-21 NOTE — MR AVS SNAPSHOT
[address] [address] 
[phone] Patient: Dylon Oviedo MRN: [id number]:[id number] Visit Information Date & Time Provider Department Dept. Phone Encounter #  
 7/21/2018  7:00 PM [address] [facility] [phone] [id number] Follow-up Instructions Return if symptoms worsen or fail to improve, for Follow up with PCP. Your Appointments 6/5/2019  8:30 AM  
PHYSICAL with Mary Perez MD  
[facility] Internal Medicine [address] [facility]) Appt Note: cpe  
 [address] [place] [address] [address]  
[address] [facility] [address] Upcoming Health Maintenance Date Due ZOSTER VACCINE AGE 60> 7/5/2018 Influenza Age 5 to Adult 8/1/2018 BREAST CANCER SCRN MAMMOGRAM 10/6/2019 COLONOSCOPY 9/24/2020 PAP AKA CERVICAL CYTOLOGY 6/1/2021 DTaP/Tdap/Td series (2 - Td) 4/3/2023 Allergies as of 7/21/2018  Review Complete On: 7/21/2018 By: Kate Kam RN No Known Allergies Current Immunizations  Reviewed on 6/1/2018 Name Date Influenza Vaccine 11/30/2017, 10/13/2016, 10/13/2015, 10/1/2014, 10/1/2013 Tdap 4/3/2013 Not reviewed this visit You Were Diagnosed With   
  
 Codes Comments Viral upper respiratory tract infection    -  Primary ICD-10-CM: J06.9 ICD-9-CM: 465.9 RST- negative Vitals BP Pulse Temp Resp Height(growth percentile) Weight(growth percentile) 132/77 83 98.8 °F (37.1 °C) 18 5' 8\" (1.727 m) 238 lb (108 kg) LMP SpO2 BMI OB Status Smoking Status 11/25/2012 97% 36.19 kg/m2 Postmenopausal Never Smoker BMI and BSA Data Body Mass Index Body Surface Area  
 36.19 kg/m 2 2.28 m 2 Preferred Pharmacy Pharmacy Name Phone [phone] [facility] [phone] Your Updated Medication List  
  
   
This list is accurate as of 7/21/18  8:03 PM.  Always use your most recent med list.  
  
  
  
  
 ALPRAZolam 0.5 mg tablet Commonly known as:  Jose A Goody Take 1 Tab by mouth every twelve (12) hours as needed. celecoxib 200 mg capsule Commonly known as:  CELEBREX  
TAKE 1 CAP BY MOUTH DAILY. lisinopril 20 mg tablet Commonly known as:  PRINIVIL, ZESTRIL  
TAKE 1 TABLET DAILY  
  
 methylPREDNISolone 4 mg tablet Commonly known as:  MEDROL (SANTA) As directed MULTIVITAMIN PO Take 1 Tab by mouth daily. omeprazole 40 mg capsule Commonly known as:  PRILOSEC  
TAKE 1 CAPSULE DAILY PARoxetine 40 mg tablet Commonly known as:  PAXIL TAKE 1 TAB BY MOUTH DAILY. VITAMIN D3 2,000 unit Tab Generic drug:  cholecalciferol (vitamin D3) Take 2,000 Units by mouth daily. Prescriptions Sent to Pharmacy Refills  
 methylPREDNISolone (MEDROL, SANTA,) 4 mg tablet 0 Sig: As directed Class: Normal  
 Pharmacy: [address] #: [phone] We Performed the Following AMB POC RAPID STREP A [24113 CPT(R)] Follow-up Instructions Return if symptoms worsen or fail to improve, for Follow up with PCP. Patient Instructions Fluids/ gargles Claritin/ allegra Tylenol cold-sinus - max strength 1-2 tab 4 times/ day  
 with Advil as needed If not better in 4-5 days - may use prednisone Upper Respiratory Infection (Cold): Care Instructions Your Care Instructions An upper respiratory infection, or URI, is an infection of the nose, sinuses, or throat. URIs are spread by coughs, sneezes, and direct contact. The common cold is the most frequent kind of URI. The flu and sinus infections are other kinds of URIs. Almost all URIs are caused by viruses. Antibiotics won't cure them.  But you can treat most infections with home care. This may include drinking lots of fluids and taking over-the-counter pain medicine. You will probably feel better in 4 to 10 days. The doctor has checked you carefully, but problems can develop later. If you notice any problems or new symptoms, get medical treatment right away. Follow-up care is a key part of your treatment and safety. Be sure to make and go to all appointments, and call your doctor if you are having problems. It's also a good idea to know your test results and keep a list of the medicines you take. How can you care for yourself at home? · To prevent dehydration, drink plenty of fluids, enough so that your urine is light yellow or clear like water. Choose water and other caffeine-free clear liquids until you feel better. If you have kidney, heart, or liver disease and have to limit fluids, talk with your doctor before you increase the amount of fluids you drink. · Take an over-the-counter pain medicine, such as acetaminophen (Tylenol), ibuprofen (Advil, Motrin), or naproxen (Aleve). Read and follow all instructions on the label. · Before you use cough and cold medicines, check the label. These medicines may not be safe for young children or for people with certain health problems. · Be careful when taking over-the-counter cold or flu medicines and Tylenol at the same time. Many of these medicines have acetaminophen, which is Tylenol. Read the labels to make sure that you are not taking more than the recommended dose. Too much acetaminophen (Tylenol) can be harmful. · Get plenty of rest. 
· Do not smoke or allow others to smoke around you. If you need help quitting, talk to your doctor about stop-smoking programs and medicines. These can increase your chances of quitting for good. When should you call for help? Call 911 anytime you think you may need emergency care. For example, call if: 
  · You have severe trouble breathing.  Call your doctor now or seek immediate medical care if: 
  · You seem to be getting much sicker.  
  · You have new or worse trouble breathing.  
  · You have a new or higher fever.  
  · You have a new rash.  
 Watch closely for changes in your health, and be sure to contact your doctor if: 
  · You have a new symptom, such as a sore throat, an earache, or sinus pain.  
  · You cough more deeply or more often, especially if you notice more mucus or a change in the color of your mucus.  
  · You do not get better as expected. Where can you learn more? Go to http://jairon-ryan.info/. Enter K641 in the search box to learn more about \"Upper Respiratory Infection (Cold): Care Instructions. \" Current as of: December 6, 2017 Content Version: 11.7 © 8033-8946 [employer]. Care instructions adapted under license by [employer] (which disclaims liability or warranty for this information). If you have questions about a medical condition or this instruction, always ask your healthcare professional. [place] [address] any warranty or liability for your use of this information. Introducing [facility]! Dear Mike Rule: 
Thank you for requesting a [employer] account. Our records indicate that you already have an active Am[employer] account. You can access your account anytime at [email or web address] Did you know that you can access your hospital and ER discharge instructions at any time in AmideBio? You can also review all of your test results from your hospital stay or ER visit. Additional Information If you have questions, please visit the Frequently Asked Questions section of the [employer] website at [email or web address] Remember, AmideBio is NOT to be used for urgent needs. For medical emergencies, dial 911. Now available from your iPhone and Android! Please provide this summary of care documentation to your next provider. Your primary care clinician is listed as Javier Side. If you have any questions after today's visit, please call [phone].

## 2018-07-22 NOTE — PATIENT INSTRUCTIONS
Fluids/ gargles  Claritin/ allegra   Tylenol cold-sinus - max strength 1-2 tab 4 times/ day    with Advil as needed    If not better in 4-5 days - may use prednisone       Upper Respiratory Infection (Cold): Care Instructions  Your Care Instructions    An upper respiratory infection, or URI, is an infection of the nose, sinuses, or throat. URIs are spread by coughs, sneezes, and direct contact. The common cold is the most frequent kind of URI. The flu and sinus infections are other kinds of URIs. Almost all URIs are caused by viruses. Antibiotics won't cure them. But you can treat most infections with home care. This may include drinking lots of fluids and taking over-the-counter pain medicine. You will probably feel better in 4 to 10 days. The doctor has checked you carefully, but problems can develop later. If you notice any problems or new symptoms, get medical treatment right away. Follow-up care is a key part of your treatment and safety. Be sure to make and go to all appointments, and call your doctor if you are having problems. It's also a good idea to know your test results and keep a list of the medicines you take. How can you care for yourself at home? · To prevent dehydration, drink plenty of fluids, enough so that your urine is light yellow or clear like water. Choose water and other caffeine-free clear liquids until you feel better. If you have kidney, heart, or liver disease and have to limit fluids, talk with your doctor before you increase the amount of fluids you drink. · Take an over-the-counter pain medicine, such as acetaminophen (Tylenol), ibuprofen (Advil, Motrin), or naproxen (Aleve). Read and follow all instructions on the label. · Before you use cough and cold medicines, check the label. These medicines may not be safe for young children or for people with certain health problems. · Be careful when taking over-the-counter cold or flu medicines and Tylenol at the same time.  Many of these medicines have acetaminophen, which is Tylenol. Read the labels to make sure that you are not taking more than the recommended dose. Too much acetaminophen (Tylenol) can be harmful. · Get plenty of rest.  · Do not smoke or allow others to smoke around you. If you need help quitting, talk to your doctor about stop-smoking programs and medicines. These can increase your chances of quitting for good. When should you call for help? Call 911 anytime you think you may need emergency care. For example, call if:    · You have severe trouble breathing.    Call your doctor now or seek immediate medical care if:    · You seem to be getting much sicker.     · You have new or worse trouble breathing.     · You have a new or higher fever.     · You have a new rash.    Watch closely for changes in your health, and be sure to contact your doctor if:    · You have a new symptom, such as a sore throat, an earache, or sinus pain.     · You cough more deeply or more often, especially if you notice more mucus or a change in the color of your mucus.     · You do not get better as expected. Where can you learn more? Go to http://jairon-ryan.info/. Enter N995 in the search box to learn more about \"Upper Respiratory Infection (Cold): Care Instructions. \"  Current as of: December 6, 2017  Content Version: 11.7  © 5602-7554 Healthwise, Incorporated. Care instructions adapted under license by [employer] (which disclaims liability or warranty for this information). If you have questions about a medical condition or this instruction, always ask your healthcare professional. [place] [address] any warranty or liability for your use of this information.

## 2018-07-22 NOTE — PROGRESS NOTES
Patient is a 59 y.o. female presenting with sore throat. Sore Throat    The history is provided by the patient. This is a new problem. The current episode started 2 days ago. The problem has not changed since onset. There has been no fever. Associated symptoms include congestion and plugged ear sensation. Pertinent negatives include no shortness of breath, no stridor, no swollen glands and no trouble swallowing. She has had no exposure to strep. She has tried acetaminophen for the symptoms. The treatment provided mild relief.         Past Medical History:   Diagnosis Date    Anxiety     Cervical polyp 2014    BENIGN ENDOCERVICAL POLYP    Colon polyps     GERD (gastroesophageal reflux disease)     Hypertension     LGSIL (low grade squamous intraepithelial lesion) on Pap smear 2006    s/p colposcopy, PAP nl since then    Menopausal syndrome (hot flashes) 9/9/2013    Obesity     Osteoarthritis     Osteopenia 9/27/2016    Primary osteoarthritis of knees, bilateral 4/13/2016    Restless leg syndrome 9/9/2013        Past Surgical History:   Procedure Laterality Date    HX BUNIONECTOMY Left 2008    HX COLONOSCOPY  2009    GI- Dr Mancil Bloch    HX COLONOSCOPY  7/30/12    polyp, irregularities, repeat 3 yrs    HX COLONOSCOPY  9/24/15    normal    HX COLPOSCOPY  2006    HX HEENT      DENTAL IMPLANT    HX KNEE ARTHROSCOPY Left 2013    (torn meniscus)    HX KNEE ARTHROSCOPY Right 3/14    HX KNEE REPLACEMENT Bilateral 04/13/2016    HX ORTHOPAEDIC Left 2015    BUNIONECTOMY LEFT FOOT    HX ORTHOPAEDIC Left 2015    LEFT FOOT SURGERY - PLATES, SCREWS, PINS    HX OTHER SURGICAL Left 5/8/15    LEFT FOOT LAPIDUS ARTHRODESIS, LEFT 2ND, 3RD MET CUNEIFORM FUSION, GASTROCNEMIS RECESSION, STJ ARTHROERESIS (GENERAL WITH POPLITEAL BLOCK)         Family History   Problem Relation Age of Onset    Parkinsonism Father     Hypertension Father     Arthritis-osteo Father     Cancer Father      PROSTATE-TREATED WITH ESTROGEN  Breast Cancer Sister 48    Glaucoma Mother     Heart Disease Mother      multiple MI's in her 52's   Abhay Bernal No Known Problems Sister     Breast Cancer Daughter 48    Anesth Problems Neg Hx         Social History     Social History    Marital status:      Spouse name: N/A    Number of children: N/A    Years of education: N/A     Occupational History    Not on file. Social History Main Topics    Smoking status: Never Smoker    Smokeless tobacco: Never Used    Alcohol use 3.6 oz/week     6 Standard drinks or equivalent per week    Drug use: No    Sexual activity: No     Other Topics Concern    Not on file     Social History Narrative    ** Merged History Encounter **                     ALLERGIES: Review of patient's allergies indicates no known allergies. Review of Systems   HENT: Positive for congestion and sore throat. Negative for trouble swallowing. Respiratory: Negative for shortness of breath and stridor. All other systems reviewed and are negative. Vitals:    07/21/18 1928   BP: 132/77   Pulse: 83   Resp: 18   Temp: 98.8 °F (37.1 °C)   SpO2: 97%   Weight: 238 lb (108 kg)   Height: 5' 8\" (1.727 m)       Physical Exam   Constitutional: No distress. HENT:   Right Ear: Tympanic membrane and ear canal normal.   Left Ear: Tympanic membrane and ear canal normal.   Nose: Nose normal.   Mouth/Throat: No oropharyngeal exudate, posterior oropharyngeal edema or posterior oropharyngeal erythema. Eyes: Conjunctivae are normal. Right eye exhibits no discharge. Left eye exhibits no discharge. Neck: Neck supple. Pulmonary/Chest: Effort normal and breath sounds normal. No respiratory distress. She has no wheezes. She has no rales. Lymphadenopathy:     She has no cervical adenopathy. Skin: No rash noted. Nursing note and vitals reviewed.       MDM    Procedures    ICD-10-CM ICD-9-CM    1. Viral upper respiratory tract infection J06.9 465.9 AMB POC RAPID STREP A    RST- negative Fluids/ gargles  Claritin/ allegra   Tylenol cold-sinus - max strength 1-2 tab 4 times/ day    with Advil as needed    If not better in 4-5 days - may use     Medications Ordered Today   Medications    methylPREDNISolone (MEDROL, SANTA,) 4 mg tablet     Sig: As directed     Dispense:  1 Dose Pack     Refill:  0     Results for orders placed or performed in visit on 07/21/18   AMB POC RAPID STREP A   Result Value Ref Range    VALID INTERNAL CONTROL POC Yes     Group A Strep Ag Negative Negative     The patients condition was discussed with the patient and they understand. The patient is to follow up with primary care doctor. If signs and symptoms become worse the pt is to go to the ER. The patient is to take medications as prescribed.

## 2018-08-20 DIAGNOSIS — E66.01 SEVERE OBESITY (BMI 35.0-39.9) WITH COMORBIDITY (HCC): Primary | ICD-10-CM

## 2018-08-20 RX ORDER — PHENTERMINE HYDROCHLORIDE 37.5 MG/1
TABLET ORAL
Qty: 30 TAB | Refills: 1 | Status: SHIPPED | OUTPATIENT
Start: 2018-08-20 | End: 2018-08-21 | Stop reason: SDUPTHER

## 2018-08-21 DIAGNOSIS — E66.01 SEVERE OBESITY (BMI 35.0-39.9) WITH COMORBIDITY (HCC): ICD-10-CM

## 2018-08-21 RX ORDER — PHENTERMINE HYDROCHLORIDE 37.5 MG/1
TABLET ORAL
Qty: 30 TAB | Refills: 1 | Status: SHIPPED | OUTPATIENT
Start: 2018-08-21 | End: 2018-10-02 | Stop reason: SDUPTHER

## 2018-10-02 ENCOUNTER — OFFICE VISIT (OUTPATIENT)
Dept: INTERNAL MEDICINE CLINIC | Age: 58
End: 2018-10-02

## 2018-10-02 VITALS
SYSTOLIC BLOOD PRESSURE: 138 MMHG | HEIGHT: 68 IN | HEART RATE: 88 BPM | BODY MASS INDEX: 33.89 KG/M2 | DIASTOLIC BLOOD PRESSURE: 76 MMHG | WEIGHT: 223.6 LBS | OXYGEN SATURATION: 97 % | TEMPERATURE: 98.8 F | RESPIRATION RATE: 12 BRPM

## 2018-10-02 DIAGNOSIS — Z23 ENCOUNTER FOR IMMUNIZATION: ICD-10-CM

## 2018-10-02 DIAGNOSIS — E66.9 OBESITY (BMI 30-39.9): Primary | ICD-10-CM

## 2018-10-02 RX ORDER — PHENTERMINE HYDROCHLORIDE 37.5 MG/1
TABLET ORAL
Qty: 90 TAB | Refills: 0 | Status: SHIPPED | OUTPATIENT
Start: 2018-10-02 | End: 2018-12-14 | Stop reason: SDUPTHER

## 2018-10-02 NOTE — PROGRESS NOTES
Chris Timmons is a 59 y.o. female who was seen in clinic today (10/2/2018). Assessment & Plan:   Diagnoses and all orders for this visit:    1. Obesity (BMI 30-39. 9)- improved, congratulated. She is out of morbid obesity range (BMI < 35). I have reviewed/discussed the above normal BMI with the patient. I have recommended the following interventions: lifestyle education regarding diet, will start w/ exercise, will continue w/ current medications. Reviewed alternative med options (see AVS). -     phentermine (ADIPEX-P) 37.5 mg tablet; 1/2 tab in AM and 1/2 tab at noon    2. Encounter for immunization  -     varicella-zoster recombinant, PF, (SHINGRIX, PF,) 50 mcg/0.5 mL susr injection; 0.5 ml IM once and then repeat in 2-6 months. Follow-up Disposition:  Return in about 3 months (around 1/2/2019) for Weight check. Subjective:   Eri Dockery was seen today for Weight Management    Weight Review:  She returns to clinic to follow up for obesity (Body mass index is 34 kg/(m^2). ). She is on the following weight loss regimen: phentermine. This is month number: 1.5. She reports: taking medications as instructed, no medication side effects noted. Diet: has increased water intake, is eating 4-5 small meals per day, working on portion size, and using MyFitnessPal.  Exercise: sedentary, but plans to add in water aerobics  She has lost and gained a total of 15 lbs since last visit. She has lost a total of 15 lbs since starting this regimen. Lab review: labs reviewed and discussed with patient.               Brief Labs:     Lab Results   Component Value Date/Time    Sodium 138 05/31/2018 08:26 AM    Potassium 4.5 05/31/2018 08:26 AM    Creatinine 0.69 05/31/2018 08:26 AM    Cholesterol, total 218 05/31/2018 08:26 AM    HDL Cholesterol 47 05/31/2018 08:26 AM    LDL, calculated 124 05/31/2018 08:26 AM    Triglyceride 235 05/31/2018 08:26 AM    Hemoglobin A1c 6.1 05/31/2018 08:26 AM    TSH 1.210 05/31/2018 08:26 AM          Prior to Admission medications    Medication Sig Start Date End Date Taking? Authorizing Provider   phentermine (ADIPEX-P) 37.5 mg tablet 1/2 tab in AM and 1/2 tab at noon 8/21/18  Yes Sommer Nixon MD   omeprazole (PRILOSEC) 40 mg capsule Take 1 Cap by mouth daily. 8/3/18  Yes Sommer Nixon MD   celecoxib (CELEBREX) 200 mg capsule TAKE 1 CAP BY MOUTH DAILY. 7/10/18  Yes Sommer Nixon MD   lisinopril (PRINIVIL, ZESTRIL) 20 mg tablet TAKE 1 TABLET DAILY 7/6/18  Yes Sommer Nixon MD   PARoxetine (PAXIL) 40 mg tablet TAKE 1 TAB BY MOUTH DAILY. 6/1/18  Yes Sommer Nixon MD   ALPRAZolam Marlan Grieve) 0.5 mg tablet Take 1 Tab by mouth every twelve (12) hours as needed. 5/14/18  Yes Sommer Nixon MD   cholecalciferol, vitamin D3, (VITAMIN D3) 2,000 unit tab Take 2,000 Units by mouth daily. Yes Historical Provider   MULTIVITAMIN PO Take 1 Tab by mouth daily. 4/27/11  Yes Historical Provider          No Known Allergies        Review of Systems   Constitutional: Positive for weight loss. Respiratory: Negative for cough and shortness of breath. Cardiovascular: Negative for chest pain and palpitations. Gastrointestinal: Negative for abdominal pain, constipation, diarrhea, nausea and vomiting. Psychiatric/Behavioral: The patient is not nervous/anxious and does not have insomnia. Objective:   Physical Exam   Constitutional: No distress. obese   Cardiovascular: Regular rhythm and normal heart sounds. No murmur heard. Pulmonary/Chest: Effort normal and breath sounds normal. She has no wheezes. She has no rales. Psychiatric: She has a normal mood and affect.  Her behavior is normal.         Visit Vitals    /76    Pulse 88    Temp 98.8 °F (37.1 °C) (Oral)    Resp 12    Ht 5' 8\" (1.727 m)    Wt 223 lb 9.6 oz (101.4 kg)    LMP 11/25/2012    SpO2 97%    BMI 34 kg/m2         Disclaimer:  Advised her to call back or return to office if symptoms worsen/change/persist.  Discussed expected course/resolution/complications of diagnosis in detail with patient. Medication risks/benefits/costs/interactions/alternatives discussed with patient. She was given an after visit summary which includes diagnoses, current medications, & vitals. She expressed understanding with the diagnosis and plan. Aspects of this note may have been generated using voice recognition software. Despite editing, there may be some syntax errors.        Orlin Aquino MD

## 2018-10-02 NOTE — PATIENT INSTRUCTIONS
WEIGHT LOSS RECOMMENDATIONS:    Medications:   - Topirimate (variable dosing, max 50mg twice a day)   - Contrave (1 tab twice daily)   - Qsymia (1 tab daily)   - Belviq (1 tab daily)              - Saxenda (1 injection daily)         Learning About Healthy Weight  What is a healthy weight? A healthy weight is the weight at which you feel good about yourself and have energy for work and play. It's also one that lowers your risk for health problems. What can you do to stay at a healthy weight? It can be hard to stay at a healthy weight, especially when fast food, vending-machine snacks, and processed foods are so easy to find. And with your busy lifestyle, activity may be low on your list of things to do. But staying at a healthy weight may be easier than you think. Here are some dos and don'ts for staying at a healthy weight:  Do eat healthy foods  The kinds of foods you eat have a big impact on both your weight and your health. Reaching and staying at a healthy weight is not about going on a diet. It's about making healthier food choices every day and changing your diet for good. Healthy eating means eating a variety of foods so that you get all the nutrients you need. Your body needs protein, carbohydrate, and fats for energy. They keep your heart beating, your brain active, and your muscles working. On most days, try to eat from each food group. This means eating a variety of:  · Whole grains, such as whole wheat breads and pastas. · Fruits and vegetables. · Dairy products, such as low-fat milk, yogurt, and cheese. · Lean proteins, such as all types of fish, chicken without the skin, and beans. Don't have too much or too little of one thing. All foods, if eaten in moderation, can be part of healthy eating. Even sweets can be okay. If your favorite foods are high in fat, salt, sugar, or calories, limit how often you eat them. Eat smaller servings, or look for healthy substitutes.   Do watch what you eat  Many people eat more than their bodies need. Part of staying at a healthy weight means learning how much food you really need from day to day and not eating more than that. Even with healthy foods, eating too much can make you gain weight. Having a well-balanced diet means that you eat enough, but not too much, and that your food gives you the nutrients you need to stay healthy. So listen to your body. Eat when you're hungry. Stop when you feel satisfied. It's a good idea to have healthy snacks ready for when you get hungry. Keep healthy snacks with you at work, in your car, and at home. If you have a healthy snack easily available, you'll be less likely to pick a candy bar or bag of chips from a vending machine instead. Some healthy snacks you might want to keep on hand are fruit, low-fat yogurt, string cheese, low-fat microwave popcorn, raisins and other dried fruit, nuts, whole wheat crackers, pretzels, carrots, celery sticks, and broccoli. Do some physical activity  A big part of reaching and staying at a healthy weight is being active. When you're active, you burn calories. This makes it easier to reach and stay at a healthy weight. When you're active on a regular basis, your body burns more calories, even when you're at rest. Being active helps you lose fat and build lean muscle. Try to be active for at least 1 hour every day. This may sound like a lot, but it's okay to be active in smaller blocks of time that add up to 1 hour a day. Any activity that makes your heart beat faster and keeps it there for a while counts. A brisk walk, run, or swim will get your heart beating faster. So will climbing stairs, shooting baskets, or cycling. Even some household chores like vacuuming and mowing the lawn will get your heart rate up. Pick activities that you enjoy-ones that make your heart beat faster, your muscles stronger, and your muscles and joints more flexible.  If you find more than one thing you like doing, do them all. You don't have to do the same thing every day. Don't diet  Diets don't work. Diets are temporary. Because you give up so much when you diet, you may be hungry and think about food all the time. And after you stop dieting, you also may overeat to make up for what you missed. Most people who diet end up gaining back the pounds they lost-and more. Remember that healthy bodies come in lots of shapes and sizes. Everyone can get healthier by eating better and being more active. Where can you learn more? Go to http://jaironHedgeCoryan.info/. Enter 449 8660 in the search box to learn more about \"Learning About Healthy Weight. \"  Current as of: September 10, 2017  Content Version: 11.7  © 8862-9529 [employer]. Care instructions adapted under license by [employer] (which disclaims liability or warranty for this information). If you have questions about a medical condition or this instruction, always ask your healthcare professional. [address] any warranty or liability for your use of this information.

## 2018-12-10 DIAGNOSIS — N95.1 MENOPAUSAL SYNDROME (HOT FLASHES): ICD-10-CM

## 2018-12-10 DIAGNOSIS — I10 ESSENTIAL HYPERTENSION WITH GOAL BLOOD PRESSURE LESS THAN 140/90: ICD-10-CM

## 2018-12-10 DIAGNOSIS — M15.9 GENERALIZED OSTEOARTHRITIS: ICD-10-CM

## 2018-12-10 RX ORDER — LISINOPRIL 20 MG/1
TABLET ORAL
Qty: 90 TAB | Refills: 0 | Status: SHIPPED | OUTPATIENT
Start: 2018-12-10 | End: 2019-02-27 | Stop reason: SDUPTHER

## 2018-12-10 RX ORDER — CELECOXIB 200 MG/1
CAPSULE ORAL
Qty: 90 CAP | Refills: 0 | Status: SHIPPED | OUTPATIENT
Start: 2018-12-10 | End: 2019-02-27 | Stop reason: SDUPTHER

## 2018-12-10 RX ORDER — PAROXETINE HYDROCHLORIDE 40 MG/1
TABLET, FILM COATED ORAL
Qty: 90 TAB | Refills: 0 | Status: SHIPPED | OUTPATIENT
Start: 2018-12-10 | End: 2019-02-27 | Stop reason: SDUPTHER

## 2018-12-14 ENCOUNTER — OFFICE VISIT (OUTPATIENT)
Dept: INTERNAL MEDICINE CLINIC | Age: 58
End: 2018-12-14

## 2018-12-14 VITALS
TEMPERATURE: 98.1 F | OXYGEN SATURATION: 99 % | DIASTOLIC BLOOD PRESSURE: 80 MMHG | HEIGHT: 68 IN | RESPIRATION RATE: 12 BRPM | WEIGHT: 214 LBS | HEART RATE: 88 BPM | BODY MASS INDEX: 32.43 KG/M2 | SYSTOLIC BLOOD PRESSURE: 110 MMHG

## 2018-12-14 DIAGNOSIS — E66.9 OBESITY (BMI 30-39.9): Primary | ICD-10-CM

## 2018-12-14 RX ORDER — PHENTERMINE HYDROCHLORIDE 37.5 MG/1
TABLET ORAL
Qty: 90 TAB | Refills: 0 | Status: SHIPPED | OUTPATIENT
Start: 2018-12-14 | End: 2019-06-05

## 2018-12-14 NOTE — PATIENT INSTRUCTIONS
DASH Diet: Care Instructions  Your Care Instructions    The DASH diet is an eating plan that can help lower your blood pressure. DASH stands for Dietary Approaches to Stop Hypertension. Hypertension is high blood pressure. The DASH diet focuses on eating foods that are high in calcium, potassium, and magnesium. These nutrients can lower blood pressure. The foods that are highest in these nutrients are fruits, vegetables, low-fat dairy products, nuts, seeds, and legumes. But taking calcium, potassium, and magnesium supplements instead of eating foods that are high in those nutrients does not have the same effect. The DASH diet also includes whole grains, fish, and poultry. The DASH diet is one of several lifestyle changes your doctor may recommend to lower your high blood pressure. Your doctor may also want you to decrease the amount of sodium in your diet. Lowering sodium while following the DASH diet can lower blood pressure even further than just the DASH diet alone. Follow-up care is a key part of your treatment and safety. Be sure to make and go to all appointments, and call your doctor if you are having problems. It's also a good idea to know your test results and keep a list of the medicines you take. How can you care for yourself at home? Following the DASH diet  · Eat 4 to 5 servings of fruit each day. A serving is 1 medium-sized piece of fruit, ½ cup chopped or canned fruit, 1/4 cup dried fruit, or 4 ounces (½ cup) of fruit juice. Choose fruit more often than fruit juice. · Eat 4 to 5 servings of vegetables each day. A serving is 1 cup of lettuce or raw leafy vegetables, ½ cup of chopped or cooked vegetables, or 4 ounces (½ cup) of vegetable juice. Choose vegetables more often than vegetable juice. · Get 2 to 3 servings of low-fat and fat-free dairy each day. A serving is 8 ounces of milk, 1 cup of yogurt, or 1 ½ ounces of cheese. · Eat 6 to 8 servings of grains each day.  A serving is 1 slice of bread, 1 ounce of dry cereal, or ½ cup of cooked rice, pasta, or cooked cereal. Try to choose whole-grain products as much as possible. · Limit lean meat, poultry, and fish to 2 servings each day. A serving is 3 ounces, about the size of a deck of cards. · Eat 4 to 5 servings of nuts, seeds, and legumes (cooked dried beans, lentils, and split peas) each week. A serving is 1/3 cup of nuts, 2 tablespoons of seeds, or ½ cup of cooked beans or peas. · Limit fats and oils to 2 to 3 servings each day. A serving is 1 teaspoon of vegetable oil or 2 tablespoons of salad dressing. · Limit sweets and added sugars to 5 servings or less a week. A serving is 1 tablespoon jelly or jam, ½ cup sorbet, or 1 cup of lemonade. · Eat less than 2,300 milligrams (mg) of sodium a day. If you limit your sodium to 1,500 mg a day, you can lower your blood pressure even more. Tips for success  · Start small. Do not try to make dramatic changes to your diet all at once. You might feel that you are missing out on your favorite foods and then be more likely to not follow the plan. Make small changes, and stick with them. Once those changes become habit, add a few more changes. · Try some of the following:  ? Make it a goal to eat a fruit or vegetable at every meal and at snacks. This will make it easy to get the recommended amount of fruits and vegetables each day. ? Try yogurt topped with fruit and nuts for a snack or healthy dessert. ? Add lettuce, tomato, cucumber, and onion to sandwiches. ? Combine a ready-made pizza crust with low-fat mozzarella cheese and lots of vegetable toppings. Try using tomatoes, squash, spinach, broccoli, carrots, cauliflower, and onions. ? Have a variety of cut-up vegetables with a low-fat dip as an appetizer instead of chips and dip. ? Sprinkle sunflower seeds or chopped almonds over salads. Or try adding chopped walnuts or almonds to cooked vegetables.   ? Try some vegetarian meals using beans and peas. Add garbanzo or kidney beans to salads. Make burritos and tacos with mashed dillon beans or black beans. Where can you learn more? Go to http://jairon-ryan.info/. Enter K738 in the search box to learn more about \"DASH Diet: Care Instructions. \"  Current as of: December 6, 2017  Content Version: 11.8  © 8909-5037 [employer]. Care instructions adapted under license by [employer] (which disclaims liability or warranty for this information). If you have questions about a medical condition or this instruction, always ask your healthcare professional. [address] any warranty or liability for your use of this information.

## 2018-12-14 NOTE — PROGRESS NOTES
Randi Cabrera is a 59 y.o. female who was seen in clinic today (12/14/2018). Assessment & Plan:   Diagnoses and all orders for this visit:    1. Obesity (BMI 30-39. 9)- improved, congratulated, I have reviewed/discussed the above normal BMI with the patient. I spent 15 minutes with the patient and >50% of the time was spent counseling on role of exercise, diet, and medications. I have recommended the following interventions: encourage exercise and lifestyle education regarding diet. Will continue meds for now, reviewed FDA approval for short term use only, will continue for another 3-4 months and then re-evaluate. [facility]  reviewed. -     phentermine (ADIPEX-P) 37.5 mg tablet; 1/2 tab in AM and 1/2 tab at noon         Follow-up Disposition:  Return in about 3 months (around 3/14/2019) for Weight check. Subjective:   Sebastien Claros was seen today for Weight Management and Menopause    Weight Review:  She returns to clinic to follow up for obesity (Body mass index is 32.54 kg/m². ). She is on the following weight loss regimen: phentermine. This is month number: 4. She reports: taking medications as instructed, no medication side effects noted. Diet: has increased water intake, is eating 4-5 small meals per day, working on portion size, and using [employer].  Exercise: sedentary, had plans to start water aerobics but did not. She has no excuse for activity. She has lost a total of 9 lbs since last visit. She has lost a total of 24 lbs since starting this regimen. Lab review: labs reviewed and discussed with patient.             Brief Labs:     Lab Results   Component Value Date/Time    Sodium 138 05/31/2018 08:26 AM    Potassium 4.5 05/31/2018 08:26 AM    Creatinine 0.69 05/31/2018 08:26 AM    Cholesterol, total 218 05/31/2018 08:26 AM    HDL Cholesterol 47 05/31/2018 08:26 AM    LDL, calculated 124 05/31/2018 08:26 AM    Triglyceride 235 05/31/2018 08:26 AM    Hemoglobin A1c 6.1 05/31/2018 08:26 AM TSH 1.210 05/31/2018 08:26 AM          Prior to Admission medications    Medication Sig Start Date End Date Taking? Authorizing Provider   lisinopril (PRINIVIL, ZESTRIL) 20 mg tablet TAKE 1 TABLET DAILY 12/10/18  Yes Antonietta Cr MD   PARoxetine (PAXIL) 40 mg tablet TAKE 1 TABLET DAILY 12/10/18  Yes Antonietta Cr MD   celecoxib (CELEBREX) 200 mg capsule TAKE 1 CAPSULE DAILY 12/10/18  Yes Antonietta Cr MD   phentermine (ADIPEX-P) 37.5 mg tablet 1/2 tab in AM and 1/2 tab at noon 10/2/18  Yes Antonietta Cr MD   omeprazole (PRILOSEC) 40 mg capsule Take 1 Cap by mouth daily. 8/3/18  Yes Antonietta Cr MD   ALPRAZolam Breze Engman) 0.5 mg tablet Take 1 Tab by mouth every twelve (12) hours as needed. 5/14/18  Yes Antonietta Cr MD   cholecalciferol, vitamin D3, (VITAMIN D3) 2,000 unit tab Take 2,000 Units by mouth daily. Yes Provider, Historical   MULTIVITAMIN PO Take 1 Tab by mouth daily. 4/27/11  Yes Provider, Historical          No Known Allergies        Review of Systems   Constitutional: Positive for weight loss. Respiratory: Negative for cough and shortness of breath. Cardiovascular: Negative for chest pain and palpitations. Gastrointestinal: Negative for abdominal pain, constipation, diarrhea, nausea and vomiting. Objective:   Physical Exam   Constitutional:        obese   Cardiovascular: Regular rhythm and normal heart sounds. No murmur heard. Pulmonary/Chest: Effort normal and breath sounds normal. She has no wheezes. She has no rales. Abdominal: Bowel sounds are normal. She exhibits no mass. There is no hepatosplenomegaly. There is no tenderness.          Visit Vitals  /80   Pulse 88   Temp 98.1 °F (36.7 °C) (Oral)   Resp 12   Ht 5' 8\" (1.727 m)   Wt 214 lb (97.1 kg)   LMP 11/25/2012   SpO2 99%   BMI 32.54 kg/m²         Disclaimer:  Advised her to call back or return to office if symptoms worsen/change/persist.  Discussed expected course/resolution/complications of diagnosis in detail with patient. Medication risks/benefits/costs/interactions/alternatives discussed with patient. She was given an after visit summary which includes diagnoses, current medications, & vitals. She expressed understanding with the diagnosis and plan. Aspects of this note may have been generated using voice recognition software. Despite editing, there may be some syntax errors.        Luis Tovar MD

## 2019-02-27 DIAGNOSIS — I10 ESSENTIAL HYPERTENSION WITH GOAL BLOOD PRESSURE LESS THAN 140/90: ICD-10-CM

## 2019-02-27 DIAGNOSIS — M15.9 GENERALIZED OSTEOARTHRITIS: ICD-10-CM

## 2019-02-27 DIAGNOSIS — N95.1 MENOPAUSAL SYNDROME (HOT FLASHES): ICD-10-CM

## 2019-02-27 RX ORDER — PAROXETINE HYDROCHLORIDE 40 MG/1
TABLET, FILM COATED ORAL
Qty: 90 TAB | Refills: 1 | Status: SHIPPED | OUTPATIENT
Start: 2019-02-27 | End: 2019-06-21 | Stop reason: SDUPTHER

## 2019-02-27 RX ORDER — LISINOPRIL 20 MG/1
TABLET ORAL
Qty: 90 TAB | Refills: 1 | Status: SHIPPED | OUTPATIENT
Start: 2019-02-27 | End: 2019-06-21 | Stop reason: SDUPTHER

## 2019-02-27 RX ORDER — CELECOXIB 200 MG/1
CAPSULE ORAL
Qty: 90 CAP | Refills: 1 | Status: SHIPPED | OUTPATIENT
Start: 2019-02-27 | End: 2019-06-21 | Stop reason: SDUPTHER

## 2019-03-26 DIAGNOSIS — M15.9 GENERALIZED OSTEOARTHRITIS: ICD-10-CM

## 2019-03-26 RX ORDER — OMEPRAZOLE 40 MG/1
40 CAPSULE, DELAYED RELEASE ORAL DAILY
Qty: 90 CAP | Refills: 1 | Status: SHIPPED | OUTPATIENT
Start: 2019-03-26 | End: 2019-06-21 | Stop reason: SDUPTHER

## 2019-04-03 ENCOUNTER — HOSPITAL ENCOUNTER (OUTPATIENT)
Dept: MAMMOGRAPHY | Age: 59
Discharge: HOME | End: 2019-04-03
Attending: INTERNAL MEDICINE
Payer: COMMERCIAL

## 2019-04-03 DIAGNOSIS — Z12.31 VISIT FOR SCREENING MAMMOGRAM: ICD-10-CM

## 2019-04-03 PROCEDURE — 77067 SCR MAMMO BI INCL CAD: CPT

## 2019-06-05 ENCOUNTER — OFFICE VISIT (OUTPATIENT)
Dept: INTERNAL MEDICINE CLINIC | Age: 59
End: 2019-06-05

## 2019-06-05 VITALS
OXYGEN SATURATION: 98 % | BODY MASS INDEX: 36.22 KG/M2 | TEMPERATURE: 98.4 F | HEART RATE: 90 BPM | HEIGHT: 68 IN | WEIGHT: 239 LBS | RESPIRATION RATE: 12 BRPM | DIASTOLIC BLOOD PRESSURE: 86 MMHG | SYSTOLIC BLOOD PRESSURE: 122 MMHG

## 2019-06-05 DIAGNOSIS — M85.89 OSTEOPENIA OF MULTIPLE SITES: ICD-10-CM

## 2019-06-05 DIAGNOSIS — I10 HYPERTENSION, ESSENTIAL: ICD-10-CM

## 2019-06-05 DIAGNOSIS — R73.03 PRE-DIABETES: ICD-10-CM

## 2019-06-05 DIAGNOSIS — N95.1 MENOPAUSAL SYNDROME (HOT FLASHES): ICD-10-CM

## 2019-06-05 DIAGNOSIS — F41.9 ANXIETY: ICD-10-CM

## 2019-06-05 DIAGNOSIS — M15.9 GENERALIZED OSTEOARTHRITIS OF MULTIPLE SITES: ICD-10-CM

## 2019-06-05 DIAGNOSIS — E66.01 MORBID OBESITY (HCC): ICD-10-CM

## 2019-06-05 DIAGNOSIS — Z00.00 ROUTINE PHYSICAL EXAMINATION: Primary | ICD-10-CM

## 2019-06-05 DIAGNOSIS — Z71.89 ADVANCED CARE PLANNING/COUNSELING DISCUSSION: ICD-10-CM

## 2019-06-05 RX ORDER — ALPRAZOLAM 0.5 MG/1
0.5 TABLET ORAL
Qty: 10 TAB | Refills: 0 | Status: SHIPPED | OUTPATIENT
Start: 2019-06-05 | End: 2020-05-08 | Stop reason: SDUPTHER

## 2019-06-05 NOTE — ACP (ADVANCE CARE PLANNING)
Advance Care Planning (ACP) Provider Note - Comprehensive     Date of ACP Conversation: 06/05/19  Persons included in Conversation:  patient  Length of ACP Conversation in minutes: <16 minutes (Non-Billable)    Authorized Decision Maker (if patient is incapable of making informed decisions):   Healthcare Agent/Medical Power of  under Advance Directive      She has an advanced directive - a copy HAS NOT been provided. Reviewed DNR/DNI and patient is not interested. General ACP for ALL Patients with Decision Making Capacity:  Importance of advance care planning, including choosing a healthcare agent to communicate patient's healthcare decisions if patient lost the ability to make decisions, such as after a sudden illness or accident  Understanding of the healthcare agent role was assessed and information provided  Opportunity offered to explore how cultural, Confucianism, spiritual, or personal beliefs would affect decisions for future care  Exploration of values, goals, and preferences if recovery is not expected, even with continued medical treatment in the event of: Imminent death or severe, permanent brain injury    For Serious or Chronic Illness:  Understanding of CPR, goals and expected outcomes, benefits and burdens discussed.   Understanding of medical condition  Information on CPR success rates provided (e.g. for CPR in hospital, survival to d/c at two weeks is 22%, for chronically ill or elderly/frail survival is less than 3%)    Interventions Provided:  Recommended communicating the plan and making copies for the healthcare agent, personal physician, and others as appropriate (e.g., health system)  Recommended review of completed ACP document annually or upon change in health status

## 2019-06-05 NOTE — PATIENT INSTRUCTIONS
Well Visit, Women 48 to 72: Care Instructions  Your Care Instructions    Physical exams can help you stay healthy. Your doctor has checked your overall health and may have suggested ways to take good care of yourself. He or she also may have recommended tests. At home, you can help prevent illness with healthy eating, regular exercise, and other steps. Follow-up care is a key part of your treatment and safety. Be sure to make and go to all appointments, and call your doctor if you are having problems. It's also a good idea to know your test results and keep a list of the medicines you take. How can you care for yourself at home? · Reach and stay at a healthy weight. This will lower your risk for many problems, such as obesity, diabetes, heart disease, and high blood pressure. · Get at least 30 minutes of exercise on most days of the week. Walking is a good choice. You also may want to do other activities, such as running, swimming, cycling, or playing tennis or team sports. · Do not smoke. Smoking can make health problems worse. If you need help quitting, talk to your doctor about stop-smoking programs and medicines. These can increase your chances of quitting for good. · Protect your skin from too much sun. When you're outdoors from 10 a.m. to 4 p.m., stay in the shade or cover up with clothing and a hat with a wide brim. Wear sunglasses that block UV rays. Even when it's cloudy, put broad-spectrum sunscreen (SPF 30 or higher) on any exposed skin. · See a dentist one or two times a year for checkups and to have your teeth cleaned. · Wear a seat belt in the car. · Limit alcohol to 1 drink a day. Too much alcohol can cause health problems. Follow your doctor's advice about when to have certain tests. These tests can spot problems early. · Cholesterol.  Your doctor will tell you how often to have this done based on your age, family history, or other things that can increase your risk for heart attack and stroke. · Blood pressure. Have your blood pressure checked during a routine doctor visit. Your doctor will tell you how often to check your blood pressure based on your age, your blood pressure results, and other factors. · Mammogram. Ask your doctor how often you should have a mammogram, which is an X-ray of your breasts. A mammogram can spot breast cancer before it can be felt and when it is easiest to treat. · Pap test and pelvic exam. Ask your doctor how often you should have a Pap test. You may not need to have a Pap test as often as you used to. · Vision. Have your eyes checked every year or two or as often as your doctor suggests. Some experts recommend that you have yearly exams for glaucoma and other age-related eye problems starting at age 48. · Hearing. Tell your doctor if you notice any change in your hearing. You can have tests to find out how well you hear. · Diabetes. Ask your doctor whether you should have tests for diabetes. · Colon cancer. You should begin tests for colon cancer at age 48. You may have one of several tests. Your doctor will tell you how often to have tests based on your age and risk. Risks include whether you already had a precancerous polyp removed from your colon or whether your parents, sisters and brothers, or children have had colon cancer. · Thyroid disease. Talk to your doctor about whether to have your thyroid checked as part of a regular physical exam. Women have an increased chance of a thyroid problem. · Osteoporosis. You should begin tests for bone density at age 72. If you are younger than 72, ask your doctor whether you have factors that may increase your risk for this disease. You may want to have this test before age 72. · Heart attack and stroke risk. At least every 4 to 6 years, you should have your risk for heart attack and stroke assessed.  Your doctor uses factors such as your age, blood pressure, cholesterol, and whether you smoke or have diabetes to show what your risk for a heart attack or stroke is over the next 10 years. When should you call for help? Watch closely for changes in your health, and be sure to contact your doctor if you have any problems or symptoms that concern you. Where can you learn more? Go to http://jairon-ryan.info/. Enter K372 in the search box to learn more about \"Well Visit, Women 50 to 72: Care Instructions. \"  Current as of: March 28, 2018  Content Version: 11.9  © 2778-9541 [employer]. Care instructions adapted under license by [employer] (which disclaims liability or warranty for this information). If you have questions about a medical condition or this instruction, always ask your healthcare professional. [address] any warranty or liability for your use of this information.

## 2019-06-06 DIAGNOSIS — R79.89 ELEVATED LFTS: Primary | ICD-10-CM

## 2019-06-06 PROBLEM — K76.0 FATTY LIVER: Status: ACTIVE | Noted: 2019-06-06

## 2019-06-06 LAB
ALBUMIN SERPL-MCNC: 4.4 G/DL (ref 3.6–4.8)
ALBUMIN/GLOB SERPL: 1.2 {RATIO} (ref 1.2–2.2)
ALP SERPL-CCNC: 147 IU/L (ref 39–117)
ALT SERPL-CCNC: 58 IU/L (ref 0–32)
AST SERPL-CCNC: 46 IU/L (ref 0–40)
BILIRUB SERPL-MCNC: 0.3 MG/DL (ref 0–1.2)
BUN SERPL-MCNC: 14 MG/DL (ref 8–27)
BUN/CREAT SERPL: 21 (ref 12–28)
CALCIUM SERPL-MCNC: 9.5 MG/DL (ref 8.7–10.3)
CHLORIDE SERPL-SCNC: 102 MMOL/L (ref 96–106)
CHOLEST SERPL-MCNC: 239 MG/DL (ref 100–199)
CO2 SERPL-SCNC: 23 MMOL/L (ref 20–29)
CREAT SERPL-MCNC: 0.67 MG/DL (ref 0.57–1)
ERYTHROCYTE [DISTWIDTH] IN BLOOD BY AUTOMATED COUNT: 14.3 % (ref 12.3–15.4)
EST. AVERAGE GLUCOSE BLD GHB EST-MCNC: 134 MG/DL
GLOBULIN SER CALC-MCNC: 3.8 G/DL (ref 1.5–4.5)
GLUCOSE SERPL-MCNC: 106 MG/DL (ref 65–99)
HBA1C MFR BLD: 6.3 % (ref 4.8–5.6)
HCT VFR BLD AUTO: 39.8 % (ref 34–46.6)
HDLC SERPL-MCNC: 51 MG/DL
HGB BLD-MCNC: 13.3 G/DL (ref 11.1–15.9)
LDLC SERPL CALC-MCNC: 141 MG/DL (ref 0–99)
MCH RBC QN AUTO: 28.9 PG (ref 26.6–33)
MCHC RBC AUTO-ENTMCNC: 33.4 G/DL (ref 31.5–35.7)
MCV RBC AUTO: 87 FL (ref 79–97)
PLATELET # BLD AUTO: 351 X10E3/UL (ref 150–450)
POTASSIUM SERPL-SCNC: 4.5 MMOL/L (ref 3.5–5.2)
PROT SERPL-MCNC: 8.2 G/DL (ref 6–8.5)
RBC # BLD AUTO: 4.6 X10E6/UL (ref 3.77–5.28)
SODIUM SERPL-SCNC: 139 MMOL/L (ref 134–144)
TRIGL SERPL-MCNC: 234 MG/DL (ref 0–149)
VLDLC SERPL CALC-MCNC: 47 MG/DL (ref 5–40)
WBC # BLD AUTO: 6.1 X10E3/UL (ref 3.4–10.8)

## 2019-06-06 NOTE — PROGRESS NOTES
Results released to patient via [employer]. All labs are stable or at goal for her, except for worsening pre-DM and worsening LFT's. Likely all related to weight. Will get US for liver. Lipids worse, but 10 yr CVD is 4.9%, no statin.

## 2019-06-21 DIAGNOSIS — N95.1 MENOPAUSAL SYNDROME (HOT FLASHES): ICD-10-CM

## 2019-06-21 DIAGNOSIS — I10 ESSENTIAL HYPERTENSION WITH GOAL BLOOD PRESSURE LESS THAN 140/90: ICD-10-CM

## 2019-06-21 DIAGNOSIS — M15.9 GENERALIZED OSTEOARTHRITIS: ICD-10-CM

## 2019-06-23 RX ORDER — PAROXETINE HYDROCHLORIDE 40 MG/1
40 TABLET, FILM COATED ORAL DAILY
Qty: 90 TAB | Refills: 1 | Status: SHIPPED | OUTPATIENT
Start: 2019-06-23 | End: 2019-06-25 | Stop reason: SDUPTHER

## 2019-06-23 RX ORDER — CELECOXIB 200 MG/1
200 CAPSULE ORAL
Qty: 90 CAP | Refills: 1 | Status: SHIPPED | OUTPATIENT
Start: 2019-06-23 | End: 2019-06-25 | Stop reason: SDUPTHER

## 2019-06-23 RX ORDER — OMEPRAZOLE 40 MG/1
40 CAPSULE, DELAYED RELEASE ORAL DAILY
Qty: 90 CAP | Refills: 1 | Status: SHIPPED | OUTPATIENT
Start: 2019-06-23 | End: 2019-06-25 | Stop reason: SDUPTHER

## 2019-06-23 RX ORDER — LISINOPRIL 20 MG/1
20 TABLET ORAL DAILY
Qty: 90 TAB | Refills: 1 | Status: SHIPPED | OUTPATIENT
Start: 2019-06-23 | End: 2019-06-25 | Stop reason: SDUPTHER

## 2019-06-25 DIAGNOSIS — I10 ESSENTIAL HYPERTENSION WITH GOAL BLOOD PRESSURE LESS THAN 140/90: ICD-10-CM

## 2019-06-25 DIAGNOSIS — N95.1 MENOPAUSAL SYNDROME (HOT FLASHES): ICD-10-CM

## 2019-06-25 DIAGNOSIS — M15.9 GENERALIZED OSTEOARTHRITIS: ICD-10-CM

## 2019-06-26 ENCOUNTER — HOSPITAL ENCOUNTER (OUTPATIENT)
Dept: MAMMOGRAPHY | Age: 59
Discharge: HOME | End: 2019-06-26
Attending: INTERNAL MEDICINE
Payer: COMMERCIAL

## 2019-06-26 ENCOUNTER — HOSPITAL ENCOUNTER (OUTPATIENT)
Dept: ULTRASOUND IMAGING | Age: 59
Discharge: HOME | End: 2019-06-26
Attending: INTERNAL MEDICINE
Payer: COMMERCIAL

## 2019-06-26 DIAGNOSIS — R59.1 LYMPHADENOPATHY: Primary | ICD-10-CM

## 2019-06-26 DIAGNOSIS — M85.89 OSTEOPENIA OF MULTIPLE SITES: ICD-10-CM

## 2019-06-26 DIAGNOSIS — R79.89 ELEVATED LFTS: ICD-10-CM

## 2019-06-26 PROCEDURE — 77080 DXA BONE DENSITY AXIAL: CPT

## 2019-06-26 PROCEDURE — 76705 ECHO EXAM OF ABDOMEN: CPT

## 2019-06-26 RX ORDER — CELECOXIB 200 MG/1
200 CAPSULE ORAL
Qty: 90 CAP | Refills: 1 | Status: SHIPPED | COMMUNITY
Start: 2019-06-26 | End: 2020-03-17

## 2019-06-26 RX ORDER — LISINOPRIL 20 MG/1
20 TABLET ORAL DAILY
Qty: 90 TAB | Refills: 1 | Status: SHIPPED | COMMUNITY
Start: 2019-06-26 | End: 2020-03-17

## 2019-06-26 RX ORDER — OMEPRAZOLE 40 MG/1
40 CAPSULE, DELAYED RELEASE ORAL DAILY
Qty: 90 CAP | Refills: 1 | Status: SHIPPED | COMMUNITY
Start: 2019-06-26 | End: 2020-04-06 | Stop reason: SDUPTHER

## 2019-06-26 RX ORDER — PAROXETINE HYDROCHLORIDE 40 MG/1
40 TABLET, FILM COATED ORAL DAILY
Qty: 90 TAB | Refills: 1 | Status: SHIPPED | COMMUNITY
Start: 2019-06-26 | End: 2020-03-17

## 2019-06-26 NOTE — PROGRESS NOTES
Verified patient identity with two identifiers. Spoke with patient by phone. Informed US shows fatty liver which would explain it. Treatment is weight loss, she states she has already started weight loss, down 6lbs already. Also noticed an incidental finding of enlarged lymph node. Dr. Fran Carrera recommend CT to investigate. Will order CT abd w/ contrast (dx- abd lymphadenopathy) and patient informed to expect call for scheduling.

## 2019-06-26 NOTE — PROGRESS NOTES
Please call patient. Ultrasound obtained due to elevated LFTs. US shows fatty liver which would explain it. Treatment is weight loss. Also noticed an incidental finding of enlarged lymph node. I'm not sure what to make of this, would recommend CT to investigate.   If pt agreeable please order CT abd w/ contrast (dx- abd lymphadenopathy)

## 2019-06-26 NOTE — PROGRESS NOTES
Results reviewed. Results released via [employer]. DEXA (Bone Density) is abnormal.  Still osteopenia. It is worse. Will offer low dose osteopenia.

## 2019-06-27 ENCOUNTER — TELEPHONE (OUTPATIENT)
Dept: INTERNAL MEDICINE CLINIC | Age: 59
End: 2019-06-27

## 2019-06-27 NOTE — TELEPHONE ENCOUNTER
[facility] Coordination of Care [phone]     FYI:  Coordination of Care left VM yesterday- attempting to schedule CT.   Caller will try pt again today

## 2019-07-07 ENCOUNTER — HOSPITAL ENCOUNTER (EMERGENCY)
Age: 59
Discharge: HOME | End: 2019-07-07
Attending: EMERGENCY MEDICINE | Admitting: EMERGENCY MEDICINE
Payer: COMMERCIAL

## 2019-07-07 ENCOUNTER — APPOINTMENT (OUTPATIENT)
Dept: GENERAL RADIOLOGY | Age: 59
End: 2019-07-07
Payer: COMMERCIAL

## 2019-07-07 ENCOUNTER — HOSPITAL ENCOUNTER (OUTPATIENT)
Dept: CT IMAGING | Age: 59
Discharge: HOME | End: 2019-07-07
Payer: COMMERCIAL

## 2019-07-07 VITALS
OXYGEN SATURATION: 100 % | TEMPERATURE: 98.4 F | DIASTOLIC BLOOD PRESSURE: 76 MMHG | RESPIRATION RATE: 15 BRPM | HEART RATE: 69 BPM | SYSTOLIC BLOOD PRESSURE: 130 MMHG
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
## 2019-07-07 DIAGNOSIS — Z96.651 HISTORY OF TOTAL RIGHT KNEE REPLACEMENT: ICD-10-CM

## 2019-07-07 DIAGNOSIS — R59.1 LYMPHADENOPATHY: ICD-10-CM

## 2019-07-07 DIAGNOSIS — M25.461 EFFUSION OF RIGHT KNEE: ICD-10-CM

## 2019-07-07 DIAGNOSIS — S89.91XA INJURY OF RIGHT KNEE, INITIAL ENCOUNTER: Primary | ICD-10-CM

## 2019-07-07 PROCEDURE — 99283 EMERGENCY DEPT VISIT LOW MDM: CPT

## 2019-07-07 PROCEDURE — 74011000255 HC RX REV CODE- 255: Performed by: INTERNAL MEDICINE

## 2019-07-07 PROCEDURE — 74011636320 HC RX REV CODE- 636/320

## 2019-07-07 PROCEDURE — 74177 CT ABD & PELVIS W/CONTRAST: CPT

## 2019-07-07 PROCEDURE — 73562 X-RAY EXAM OF KNEE 3: CPT

## 2019-07-07 RX ORDER — BARIUM SULFATE 20 MG/ML
900 SUSPENSION ORAL
Status: COMPLETED | OUTPATIENT
Start: 2019-07-07 | End: 2019-07-07

## 2019-07-07 RX ORDER — SODIUM CHLORIDE 0.9 % (FLUSH) 0.9 %
10 SYRINGE (ML) INJECTION
Status: COMPLETED | OUTPATIENT
Start: 2019-07-07 | End: 2019-07-07

## 2019-07-07 RX ADMIN — BARIUM SULFATE 900 ML: 21 SUSPENSION ORAL at 10:39

## 2019-07-07 RX ADMIN — IOPAMIDOL 100 ML: 755 INJECTION, SOLUTION INTRAVENOUS at 10:38

## 2019-07-07 RX ADMIN — Medication 10 ML: at 10:40

## 2019-07-07 NOTE — DISCHARGE INSTRUCTIONS
Rest, ice/cool compresses several times daily x 2 days, then alternate ice/moist heat, gentle stretching. Follow up with Orthopedist if symptoms persist.  Return to the Emergency Dept for any continued/worsening pain. Patient Education        Knee Pain or Injury: Care Instructions  Your Care Instructions    Injuries are a common cause of knee problems. Sudden (acute) injuries may be caused by a direct blow to the knee. They can also be caused by abnormal twisting, bending, or falling on the knee. Pain, bruising, or swelling may be severe, and may start within minutes of the injury. Overuse is another cause of knee pain. Other causes are climbing stairs, kneeling, and other activities that use the knee. Everyday wear and tear, especially as you get older, also can cause knee pain. Rest, along with home treatment, often relieves pain and allows your knee to heal. If you have a serious knee injury, you may need tests and treatment. Follow-up care is a key part of your treatment and safety. Be sure to make and go to all appointments, and call your doctor if you are having problems. It's also a good idea to know your test results and keep a list of the medicines you take. How can you care for yourself at home? · Be safe with medicines. Read and follow all instructions on the label. ? If the doctor gave you a prescription medicine for pain, take it as prescribed. ? If you are not taking a prescription pain medicine, ask your doctor if you can take an over-the-counter medicine. · Rest and protect your knee. Take a break from any activity that may cause pain. · Put ice or a cold pack on your knee for 10 to 20 minutes at a time. Put a thin cloth between the ice and your skin. · Prop up a sore knee on a pillow when you ice it or anytime you sit or lie down for the next 3 days. Try to keep it above the level of your heart. This will help reduce swelling.   · If your knee is not swollen, you can put moist heat, a heating pad, or a warm cloth on your knee. · If your doctor recommends an elastic bandage, sleeve, or other type of support for your knee, wear it as directed. · Follow your doctor's instructions about how much weight you can put on your leg. Use a cane, crutches, or a walker as instructed. · Follow your doctor's instructions about activity during your healing process. If you can do mild exercise, slowly increase your activity. · Reach and stay at a healthy weight. Extra weight can strain the joints, especially the knees and hips, and make the pain worse. Losing even a few pounds may help. When should you call for help? Call 911 anytime you think you may need emergency care. For example, call if:    · You have symptoms of a blood clot in your lung (called a pulmonary embolism). These may include:  ? Sudden chest pain. ? Trouble breathing. ? Coughing up blood.    Call your doctor now or seek immediate medical care if:    · You have severe or increasing pain.     · Your leg or foot turns cold or changes color.     · You cannot stand or put weight on your knee.     · Your knee looks twisted or bent out of shape.     · You cannot move your knee.     · You have signs of infection, such as:  ? Increased pain, swelling, warmth, or redness. ? Red streaks leading from the knee. ? Pus draining from a place on your knee. ? A fever.     · You have signs of a blood clot in your leg (called a deep vein thrombosis), such as:  ? Pain in your calf, back of the knee, thigh, or groin. ? Redness and swelling in your leg or groin.    Watch closely for changes in your health, and be sure to contact your doctor if:    · You have tingling, weakness, or numbness in your knee.     · You have any new symptoms, such as swelling.     · You have bruises from a knee injury that last longer than 2 weeks.     · You do not get better as expected. Where can you learn more? Go to http://jairon-ryan.info/.   Enter K195 in the search box to learn more about \"Knee Pain or Injury: Care Instructions. \"  Current as of: September 23, 2018  Content Version: 11.9  © 5238-3846 [employer]. Care instructions adapted under license by [employer] (which disclaims liability or warranty for this information). If you have questions about a medical condition or this instruction, always ask your healthcare professional. [place] [address] any warranty or liability for your use of this information.

## 2019-07-07 NOTE — LETTER
[address] 
[facility] EMERGENCY DEPT 
[address] [place] [address] [address] [id number] [phone] Work/School Note Date: 7/7/2019 To Whom It May concern: 
 
Earlene Alexandre was seen and treated today in the emergency room. She may return to work in 1 to 2 days, as symptoms improve. Sincerely, Hannah Leahy

## 2019-07-08 ENCOUNTER — PATIENT OUTREACH (OUTPATIENT)
Dept: OTHER | Age: 59
End: 2019-07-08

## 2019-07-08 NOTE — ED PROVIDER NOTES
EMERGENCY DEPARTMENT HISTORY AND PHYSICAL EXAM      Date: 7/7/2019  Patient Name: Robby Monreal    History of Presenting Illness     Chief Complaint   Patient presents with   Addi Notice Fall     The patient presents to the ED with right knee pain, leaving out patient CT, walking down [place] slipped in water that was on floor in hallway and fell on knee, hx of right knee replacement. History Provided By: Patient and Patient's     HPI: Robby Monreal, 59 y.o. female presents to the Emergency Dept with her  after GLF when she slipped in water in the hallway when leaving outpatient CT. She denied striking her head. No LOC. She is s/p TKR. She c/o increased pain and swelling to the knee. No numbness/tingling/weakness. She suffered an abrasion to the knee. No neck or back pain. Dr. Moss Pallas is her Orthopedist.   Pt is o/w healthy without fever, chills, cough, congestion, ST, shortness of breath, chest pain, N/V/D. Chief Complaint: R knee injury s/p GLF  Duration: 30 Minutes  Timing:  Acute  Location: R knee  Quality: Aching  Severity: Mild to moderate  Modifying Factors: pt is s/p TKR  Associated Symptoms: abrasion        There are no other complaints, changes, or physical findings at this time. PCP: Alverto Ford MD    Current Outpatient Medications   Medication Sig Dispense Refill    PARoxetine (PAXIL) 40 mg tablet Take 1 Tab by mouth daily. 90 Tab 1    celecoxib (CELEBREX) 200 mg capsule Take 1 Cap by mouth daily as needed for Pain. 90 Cap 1    lisinopril (PRINIVIL, ZESTRIL) 20 mg tablet Take 1 Tab by mouth daily. 90 Tab 1    omeprazole (PRILOSEC) 40 mg capsule Take 1 Cap by mouth daily. 90 Cap 1    MAGNESIUM PO Take  by mouth daily.  ALPRAZolam (XANAX) 0.5 mg tablet Take 1 Tab by mouth every twelve (12) hours as needed for Anxiety. 10 Tab 0    cholecalciferol, vitamin D3, (VITAMIN D3) 2,000 unit tab Take 2,000 Units by mouth daily.       MULTIVITAMIN PO Take 1 Tab by mouth daily. Past History     Past Medical History:  Past Medical History:   Diagnosis Date    Anxiety     Cervical polyp 2014    BENIGN ENDOCERVICAL POLYP    Colon polyps     GERD (gastroesophageal reflux disease)     Hypertension     LGSIL (low grade squamous intraepithelial lesion) on Pap smear 2006    s/p colposcopy, PAP nl since then    Menopausal syndrome (hot flashes) 9/9/2013    Obesity     Osteoarthritis     Osteopenia 9/27/2016    Primary osteoarthritis of knees, bilateral 4/13/2016    Restless leg syndrome 9/9/2013       Past Surgical History:  Past Surgical History:   Procedure Laterality Date    HX BUNIONECTOMY Left 2008    HX COLONOSCOPY  2009    GI- Dr Ever Sweet    HX COLONOSCOPY  7/30/12    polyp, irregularities, repeat 3 yrs    HX COLONOSCOPY  9/24/15    normal    HX COLPOSCOPY  2006    HX HEENT      DENTAL IMPLANT    HX KNEE ARTHROSCOPY Left 2013    (torn meniscus)    HX KNEE ARTHROSCOPY Right 3/14    HX KNEE REPLACEMENT Bilateral 04/13/2016    HX ORTHOPAEDIC Left 2015    BUNIONECTOMY LEFT FOOT    HX ORTHOPAEDIC Left 2015    LEFT FOOT SURGERY - PLATES, SCREWS, PINS    HX OTHER SURGICAL Left 5/8/15    LEFT FOOT LAPIDUS ARTHRODESIS, LEFT 2ND, 3RD MET CUNEIFORM FUSION, GASTROCNEMIS RECESSION, STJ ARTHROERESIS (GENERAL WITH POPLITEAL BLOCK)       Family History:  Family History   Problem Relation Age of Onset    Parkinsonism Father     Hypertension Father     Arthritis-osteo Father     Cancer Father         PROSTATE-TREATED WITH ESTROGEN    Breast Cancer Sister 48    Glaucoma Mother     Heart Disease Mother         multiple MI's in her 52's   [address] No Known Problems Sister     Anesth Problems Neg Hx        Social History:  Social History     Tobacco Use    Smoking status: Never Smoker    Smokeless tobacco: Never Used   Substance Use Topics    Alcohol use:  Yes     Alcohol/week: 3.6 oz     Types: 6 Standard drinks or equivalent per week     Frequency: 4 or more times a week     Drinks per session: 1 or 2     Binge frequency: Never    Drug use: No       Allergies:  No Known Allergies      Review of Systems   Review of Systems   Constitutional: Negative for chills and fever. HENT: Negative for congestion, rhinorrhea and sore throat. Respiratory: Negative for cough and shortness of breath. Cardiovascular: Negative for chest pain and palpitations. Gastrointestinal: Negative for abdominal pain, diarrhea, nausea and vomiting. Endocrine: Negative for polydipsia, polyphagia and polyuria. Genitourinary: Negative for dysuria and hematuria. Musculoskeletal: Positive for arthralgias and joint swelling. Negative for neck pain and neck stiffness. Skin: Positive for wound. Negative for rash. Neurological: Negative for dizziness and headaches. Psychiatric/Behavioral: Negative for agitation and confusion. Physical Exam   Physical Exam   Constitutional: She is oriented to person, place, and time. She appears well-developed and well-nourished. No distress. WDWN female, alert, in NAD   HENT:   Head: Normocephalic and atraumatic. Nose: Nose normal.   Eyes: Conjunctivae and EOM are normal. Right eye exhibits no discharge. Left eye exhibits no discharge. No scleral icterus. Neck: Normal range of motion. Neck supple. No JVD present. No tracheal deviation present. No thyromegaly present. No cervical spinal tenderness   Cardiovascular: Normal rate, regular rhythm, normal heart sounds and intact distal pulses. Pulmonary/Chest: Effort normal and breath sounds normal. No respiratory distress. She has no wheezes. She exhibits no tenderness. Abdominal: Soft. There is no tenderness. Musculoskeletal: She exhibits edema and tenderness. Decreased A/P ROM to R knee due to tenderness with palpation/movement, mild to moderate effusion, superficial abrasion noted to patella, no crepitus, no erythema/rash/lesion/heat.   2+ distal pulses, NVI, sensation grossly intact to light touch. Pt tolerant of weight bearing. Hip and ankle nontender. Lymphadenopathy:     She has no cervical adenopathy. Neurological: She is alert and oriented to person, place, and time. She exhibits normal muscle tone. Coordination normal.   Skin: Skin is warm and dry. She is not diaphoretic. Superficial abrasion noted R knee, no active bleeding   Psychiatric: She has a normal mood and affect. Her behavior is normal.   Nursing note and vitals reviewed. Diagnostic Study Results     Labs -   No results found for this or any previous visit (from the past 12 hour(s)). Radiologic Studies -   XR KNEE RT 3 V   Final Result   IMPRESSION: No acute fracture or dislocation. Normal prosthesis alignment. Small   joint effusion. CT Results  (Last 48 hours)               07/07/19 1039  CT ABD PELV W CONT Final result    Impression:  IMPRESSION:    1. No lymphadenopathy. A portacaval node at the upper limits of normal in size   and accounts for the abnormality seen on ultrasound and is not considered   significant. 2. No significant abnormalities. Narrative:  EXAMINATION:  CT ABD PELV W CONT   INDICATION:  lymphadenopathy   COMPARISON: None. CONTRAST: 100 mL of Isovue-370. TECHNIQUE:    Multislice helical CT was performed from the diaphragm to the symphysis pubis   during uneventful rapid bolus intravenous contrast administration. Oral contrast   was not administered. Contiguous 5 mm axial images were reconstructed and lung   and soft tissue windows were generated. Coronal and sagittal reformations were   generated. CT dose reduction was achieved through use of a standardized protocol   tailored for this examination and automatic exposure control for dose   modulation. FINDINGS:   LOWER CHEST: Lung bases clear. 2 mildly prominent right pericardial lymph nodes,   the largest measuring 1 cm in long axis. ABDOMEN:   Liver: No focal lesions. Normal size and contour.   Normal attenuation of the   liver. Gallbladder and bile ducts: No gallstones. No biliary dilatation. Spleen: No abnormality. Pancreas: No abnormality. Adrenal glands: No abnormality. Kidneys: Tiny nonobstructing left lower pole renal calculus. Small hypodense   renal lesions which likely represent cysts. No hydronephrosis. No suspicious   focal liver lesion. BOWEL AND MESENTERY: Stomach appears unremarkable. No small bowel   abnormalities. No significant colonic abnormalities. No mesenteric mass or   adenopathy. Appendix not clearly seen but not distended. No right lower quadrant   inflammatory changes. PERITONEUM: No ascites or free intraperitoneal air. RETROPERITONEUM: Aorta  tapers without aneurysm. No lymph node enlargement. Portacaval node with a short axis of 1 cm is located in proximity to the   pancreatic head and is the likely etiology for the lymph node seen on   ultrasound. This lymph node is not significantly enlarged and is considered   incidental. No pelvic mass or adenopathy. PELVIS:   Reproductive organs:  No significant abnormalities. Uterus is retroflexed. Bladder: No abnormality. BONES AND SOFT TISSUES: Degenerative changes in the spine. No acute or   destructive osseous lesion. Medical Decision Making   I am the first provider for this patient. I reviewed the vital signs, available nursing notes, past medical history, past surgical history, family history and social history. Vital Signs-Reviewed the patient's vital signs.   Patient Vitals for the past 12 hrs:   Temp Pulse Resp BP SpO2   07/07/19 1203 98.4 °F (36.9 °C) 69 15 130/76 100 %   07/07/19 1117     100 %   07/07/19 1110 98.2 °F (36.8 °C) 67 15 128/82 100 %         Records Reviewed: Nursing Notes, Old Medical Records, Previous Radiology Studies and Previous Laboratory Studies    Provider Notes (Medical Decision Making):   Fracture, strain, effusion, contusion    ED Course:   Initial assessment performed. The patients presenting problems have been discussed, and they are in agreement with the care plan formulated and outlined with them. I have encouraged them to ask questions as they arise throughout their visit. DISCHARGE NOTE:  The care plan has been outline with the patient and/or family, who verbally conveyed understanding and agreement. Available results have been reviewed. Patient and/or family understand the follow up plan as outlined and discharge instructions. Should their condition deterioration at any time after discharge the patient agrees to return, follow up sooner than outlined or seek medical assistance at the closest Emergency Room as soon as possible. Questions have been answered. Discharge instructions and educational information regarding the patient's diagnosis as well a list of reasons why the patient would want to seek immediate medical attention, should their condition change, were reviewed directly with the patient/family       PLAN:  1. Discharge Medication List as of 7/7/2019 12:12 PM        2. Follow-up Information     Follow up With Specialties Details Why Jairo Montenegro MD Orthopedic Surgery   [address] [facility] [address]  [address] [phone]      \[facility]\"" EMERGENCY DEPT Emergency Medicine  If symptoms worsen [address]  [phone]        Return to ED if worse     Diagnosis     Clinical Impression:   1. Injury of right knee, initial encounter    2. Fall, initial encounter    3. History of total right knee replacement    4.  Effusion of right knee

## 2019-07-17 ENCOUNTER — PATIENT OUTREACH (OUTPATIENT)
Dept: OTHER | Age: 59
End: 2019-07-17

## 2019-07-17 NOTE — PROGRESS NOTES
Patient identified as eligible for [address] [place] [address] services. Second telephone outreach attempted. Left discreet voicemail with this CM confidential contact information. Will send UTR letter.

## 2019-07-17 NOTE — LETTER
7/17/2019 1:25 PM 
 
Ms. Isael Parks [address] Dear Ms. Miroslava Alejo, My name is Huong Pike, Employee Care Manager for [facility] and I have been trying to reach you. The Employee Care Management [facility]) program is a free-of-charge confidential service provided to our employees and their family members covered by the [facility]. The program will provide an employee and his/her family with the [facility] expertise to assist in navigating the health care delivery system, provider services, and their overall care needsso as to assure and improve health care interactions and enhance the quality of life. This program is designed to provide you with the opportunity to have a [facility] care manager partner with you for the following services: 
 
 1) when you come home from the hospital or emergency room 2) when help is needed to manage your disease 3) when you need assistance coordinating services or appointments ECM now partners with [facility]. If you are a qualifying employee, you may receive an additional 10 wellness incentive points for every month of active participation with an Employee Care Manager. [facility] is dedicated to empowering the good health of its community and improving the quality of care and care experiences for employees and their families. We are committed to safeguarding patient confidentiality and privacy, assuring that every employee has the respect he or she deserves in managing their health. The information shared with your care manager will not be shared with anyone else aside from those you identify as part of your care team, and will only be used to assist you with any identified care needs. Please contact me if you would like this service provided to you. Sincerely, Huong Pike RN Huong Pike RN  Employee Care Manager [facility].  
 [address] [place] [address], [address] [place] [address] [address] [facility] Cell [phone] Fax [email or web address]

## 2019-08-26 ENCOUNTER — PATIENT OUTREACH (OUTPATIENT)
Dept: OTHER | Age: 59
End: 2019-08-26

## 2019-08-26 NOTE — LETTER
8/26/2019 2:27 PM 
 
Ms. Bernie Leong [address] Dear Ms. Rebecca Johnson, My name is Chela Preston, Employee Care Manager for [facility] and I have been trying to reach you. The Employee Care Management [facility]) program is a free-of-charge confidential service provided to our employees and their family members covered by the [facility]. The program will provide an employee and his/her family with the [facility] expertise to assist in navigating the health care delivery system, provider services, and their overall care needsso as to assure and improve health care interactions and enhance the quality of life. This program is designed to provide you with the opportunity to have a [facility] care manager partner with you for the following services: 
 
 1) when you come home from the hospital or emergency room 2) when help is needed to manage your disease 3) when you need assistance coordinating services or appointments ECM now partners with [facility]. If you are a qualifying employee, you may receive an additional 10 wellness incentive points for every month of active participation with an Employee Care Manager. [facility] is dedicated to empowering the good health of its community and improving the quality of care and care experiences for employees and their families. We are committed to safeguarding patient confidentiality and privacy, assuring that every employee has the respect he or she deserves in managing their health. The information shared with your care manager will not be shared with anyone else aside from those you identify as part of your care team, and will only be used to assist you with any identified care needs. Please contact me if you would like this service provided to you. Sincerely, BELL Gunn RN  Employee Care Manager [facility] [facility].  
 [address] [place] [address], [address] [place] [address] [address] [facility] [phone] Fax [email or web address]

## 2019-12-30 ENCOUNTER — OFFICE VISIT (OUTPATIENT)
Dept: INTERNAL MEDICINE CLINIC | Age: 59
End: 2019-12-30

## 2019-12-30 VITALS
RESPIRATION RATE: 16 BRPM | HEIGHT: 67 IN | OXYGEN SATURATION: 97 % | SYSTOLIC BLOOD PRESSURE: 120 MMHG | TEMPERATURE: 98.8 F | WEIGHT: 241.8 LBS | HEART RATE: 86 BPM | BODY MASS INDEX: 37.95 KG/M2 | DIASTOLIC BLOOD PRESSURE: 84 MMHG

## 2019-12-30 DIAGNOSIS — T84.093D FAILED TOTAL KNEE, LEFT, SUBSEQUENT ENCOUNTER: ICD-10-CM

## 2019-12-30 DIAGNOSIS — Z01.818 PRE-OP EVALUATION: Primary | ICD-10-CM

## 2019-12-30 DIAGNOSIS — R73.03 PRE-DIABETES: ICD-10-CM

## 2019-12-30 DIAGNOSIS — I10 HYPERTENSION, ESSENTIAL: ICD-10-CM

## 2019-12-30 RX ORDER — TRAMADOL HYDROCHLORIDE 50 MG/1
TABLET ORAL
COMMUNITY
Start: 2019-11-14 | End: 2020-01-17

## 2019-12-30 NOTE — PROGRESS NOTES
Chief Complaint   Patient presents with    Pre-op Exam     Left knee      Reviewed record in preparation for visit and have obtained necessary documentation. Identified pt with two pt identifiers(name and ). Health Maintenance Due   Topic    Shingrix Vaccine Age 49> (1 of 2)         Chief Complaint   Patient presents with    Pre-op Exam     Left knee         Wt Readings from Last 3 Encounters:   19 241 lb 12.8 oz (109.7 kg)   19 239 lb (108.4 kg)   18 214 lb (97.1 kg)     Temp Readings from Last 3 Encounters:   19 98.8 °F (37.1 °C) (Oral)   19 98.4 °F (36.9 °C)   19 98.4 °F (36.9 °C) (Oral)     BP Readings from Last 3 Encounters:   19 120/84   19 130/76   19 122/86     Pulse Readings from Last 3 Encounters:   19 86   19 69   19 90           Learning Assessment:  :     Learning Assessment 2015   PRIMARY LEARNER Patient Patient   HIGHEST LEVEL OF EDUCATION - PRIMARY LEARNER  4 YEARS OF COLLEGE 4 YEARS OF COLLEGE   BARRIERS PRIMARY LEARNER NONE NONE   CO-LEARNER CAREGIVER No No   PRIMARY LANGUAGE ENGLISH ENGLISH   LEARNER PREFERENCE PRIMARY READING DEMONSTRATION   ANSWERED BY Patient Patient   RELATIONSHIP SELF SELF       Depression Screening:  :     3 most recent PHQ Screens 2019   Little interest or pleasure in doing things Not at all   Feeling down, depressed, irritable, or hopeless Not at all   Total Score PHQ 2 0       Fall Risk Assessment:  :     Fall Risk Assessment, last 12 mths 2019   Able to walk? Yes   Fall in past 12 months? No       Abuse Screening:  :     Abuse Screening Questionnaire 2019   Do you ever feel afraid of your partner? N N   Are you in a relationship with someone who physically or mentally threatens you? N N   Is it safe for you to go home?  Y Y       Coordination of Care Questionnaire:  :     1) Have you been to an emergency room, urgent care clinic since your last visit? no   Hospitalized since your last visit? no             2) Have you seen or consulted any other health care providers outside of [address] [place] [address] since your last visit? yes  (Include any pap smears or colon screenings in this section.)    3) Do you have an Advance Directive on file? no    4) Are you interested in receiving information on Advance Directives? NO      Patient is accompanied by self I have received verbal consent from Nataliia Larose to discuss any/all medical information while they are present in the room. Reviewed record  In preparation for visit and have obtained necessary documentation.

## 2019-12-30 NOTE — PROGRESS NOTES
61 yo female in for pre op eval for Revision LTKR on 1/15/20 by Dr. Nelda Miller at [facility]. See scanned in form. She has not yet been scheduled for pre-op labs, etc.  She will call about this.

## 2020-01-06 ENCOUNTER — HOSPITAL ENCOUNTER (OUTPATIENT)
Dept: PREADMISSION TESTING | Age: 60
Discharge: HOME | End: 2020-01-06
Payer: COMMERCIAL

## 2020-01-06 VITALS
HEART RATE: 77 BPM | SYSTOLIC BLOOD PRESSURE: 116 MMHG | HEIGHT: 68 IN | DIASTOLIC BLOOD PRESSURE: 76 MMHG | RESPIRATION RATE: 18 BRPM | BODY MASS INDEX: 36.9 KG/M2 | TEMPERATURE: 98.3 F | WEIGHT: 243.5 LBS

## 2020-01-06 LAB
ABO + RH BLD: NORMAL
ANION GAP SERPL CALC-SCNC: 7 MMOL/L (ref 5–15)
APPEARANCE UR: CLEAR
ATRIAL RATE: 78 BPM
BACTERIA URNS QL MICRO: NEGATIVE /HPF
BILIRUB UR QL: NEGATIVE
BLOOD GROUP ANTIBODIES SERPL: NORMAL
BUN SERPL-MCNC: 15 MG/DL (ref 6–20)
BUN/CREAT SERPL: 19 (ref 12–20)
CALCIUM SERPL-MCNC: 8.5 MG/DL (ref 8.5–10.1)
CALCULATED P AXIS, ECG09: 20 DEGREES
CALCULATED R AXIS, ECG10: 23 DEGREES
CALCULATED T AXIS, ECG11: 40 DEGREES
CHLORIDE SERPL-SCNC: 105 MMOL/L (ref 97–108)
CO2 SERPL-SCNC: 25 MMOL/L (ref 21–32)
COLOR UR: NORMAL
CREAT SERPL-MCNC: 0.79 MG/DL (ref 0.55–1.02)
CRP SERPL-MCNC: 0.82 MG/DL (ref 0–0.6)
DIAGNOSIS, 93000: NORMAL
EPITH CASTS URNS QL MICRO: NORMAL /LPF
ERYTHROCYTE [DISTWIDTH] IN BLOOD BY AUTOMATED COUNT: 13.7 % (ref 11.5–14.5)
ERYTHROCYTE [SEDIMENTATION RATE] IN BLOOD: 52 MM/HR (ref 0–30)
EST. AVERAGE GLUCOSE BLD GHB EST-MCNC: 131 MG/DL
GLUCOSE SERPL-MCNC: 158 MG/DL (ref 65–100)
GLUCOSE UR STRIP.AUTO-MCNC: NEGATIVE MG/DL
HBA1C MFR BLD: 6.2 % (ref 4–5.6)
HCT VFR BLD AUTO: 40.1 % (ref 35–47)
HGB BLD-MCNC: 12.3 G/DL (ref 11.5–16)
HGB UR QL STRIP: NEGATIVE
HYALINE CASTS URNS QL MICRO: NORMAL /LPF (ref 0–5)
INR PPP: 1 (ref 0.9–1.1)
KETONES UR QL STRIP.AUTO: NEGATIVE MG/DL
LEUKOCYTE ESTERASE UR QL STRIP.AUTO: NEGATIVE
MCH RBC QN AUTO: 28.2 PG (ref 26–34)
MCHC RBC AUTO-ENTMCNC: 30.7 G/DL (ref 30–36.5)
MCV RBC AUTO: 92 FL (ref 80–99)
NITRITE UR QL STRIP.AUTO: NEGATIVE
NRBC # BLD: 0 K/UL (ref 0–0.01)
NRBC BLD-RTO: 0 PER 100 WBC
P-R INTERVAL, ECG05: 164 MS
PH UR STRIP: 5.5 [PH] (ref 5–8)
PLATELET # BLD AUTO: 298 K/UL (ref 150–400)
PMV BLD AUTO: 10.3 FL (ref 8.9–12.9)
POTASSIUM SERPL-SCNC: 3.9 MMOL/L (ref 3.5–5.1)
PROT UR STRIP-MCNC: NEGATIVE MG/DL
PROTHROMBIN TIME: 9.8 SEC (ref 9–11.1)
Q-T INTERVAL, ECG07: 392 MS
QRS DURATION, ECG06: 80 MS
QTC CALCULATION (BEZET), ECG08: 446 MS
RBC # BLD AUTO: 4.36 M/UL (ref 3.8–5.2)
RBC #/AREA URNS HPF: NORMAL /HPF (ref 0–5)
SODIUM SERPL-SCNC: 137 MMOL/L (ref 136–145)
SP GR UR REFRACTOMETRY: 1.02 (ref 1–1.03)
SPECIMEN EXP DATE BLD: NORMAL
UA: UC IF INDICATED,UAUC: NORMAL
UROBILINOGEN UR QL STRIP.AUTO: 0.2 EU/DL (ref 0.2–1)
VENTRICULAR RATE, ECG03: 78 BPM
WBC # BLD AUTO: 5.3 K/UL (ref 3.6–11)
WBC URNS QL MICRO: NORMAL /HPF (ref 0–4)

## 2020-01-06 PROCEDURE — 86140 C-REACTIVE PROTEIN: CPT

## 2020-01-06 PROCEDURE — 83036 HEMOGLOBIN GLYCOSYLATED A1C: CPT

## 2020-01-06 PROCEDURE — 93005 ELECTROCARDIOGRAM TRACING: CPT

## 2020-01-06 PROCEDURE — 85610 PROTHROMBIN TIME: CPT

## 2020-01-06 PROCEDURE — 85652 RBC SED RATE AUTOMATED: CPT

## 2020-01-06 PROCEDURE — 86900 BLOOD TYPING SEROLOGIC ABO: CPT

## 2020-01-06 PROCEDURE — 80048 BASIC METABOLIC PNL TOTAL CA: CPT

## 2020-01-06 PROCEDURE — 81001 URINALYSIS AUTO W/SCOPE: CPT

## 2020-01-06 PROCEDURE — 85027 COMPLETE CBC AUTOMATED: CPT

## 2020-01-06 RX ORDER — ACETAMINOPHEN 325 MG/1
500 TABLET ORAL
COMMUNITY
End: 2020-01-17

## 2020-01-07 LAB
BACTERIA SPEC CULT: NORMAL
BACTERIA SPEC CULT: NORMAL
SERVICE CMNT-IMP: NORMAL

## 2020-01-07 NOTE — PERIOP NOTES
CALLED DR NANCE`S OFFICE AND LEFT A VOICEMAIL FOR NURSE ABOUT ABNORMAL LABS, EKG  ALL LABS, EKG  HAVE BEEN FAXED OVER TO OFFICE.

## 2020-01-14 ENCOUNTER — ANESTHESIA EVENT (OUTPATIENT)
Dept: SURGERY | Age: 60
DRG: 468 | End: 2020-01-14
Payer: COMMERCIAL

## 2020-01-14 RX ORDER — ACETAMINOPHEN 500 MG
1000 TABLET ORAL ONCE
Status: CANCELLED | OUTPATIENT
Start: 2020-01-14 | End: 2020-01-14

## 2020-01-14 RX ORDER — CELECOXIB 200 MG/1
200 CAPSULE ORAL ONCE
Status: CANCELLED | OUTPATIENT
Start: 2020-01-14 | End: 2020-01-14

## 2020-01-14 RX ORDER — CEFAZOLIN SODIUM/WATER 2 G/20 ML
2 SYRINGE (ML) INTRAVENOUS ONCE
Status: CANCELLED | OUTPATIENT
Start: 2020-01-15 | End: 2020-01-15

## 2020-01-14 RX ORDER — PREGABALIN 75 MG/1
75 CAPSULE ORAL ONCE
Status: CANCELLED | OUTPATIENT
Start: 2020-01-14 | End: 2020-01-14

## 2020-01-15 ENCOUNTER — ANESTHESIA (OUTPATIENT)
Dept: SURGERY | Age: 60
DRG: 468 | End: 2020-01-15
Payer: COMMERCIAL

## 2020-01-15 ENCOUNTER — HOSPITAL ENCOUNTER (INPATIENT)
Age: 60
LOS: 2 days | Discharge: HOME HEALTH | DRG: 468 | End: 2020-01-17
Attending: ORTHOPAEDIC SURGERY | Admitting: ORTHOPAEDIC SURGERY
Payer: COMMERCIAL

## 2020-01-15 DIAGNOSIS — T84.038A MECHANICAL LOOSENING OF PROSTHETIC KNEE, INITIAL ENCOUNTER (HCC): Primary | ICD-10-CM

## 2020-01-15 DIAGNOSIS — Z96.659 MECHANICAL LOOSENING OF PROSTHETIC KNEE, INITIAL ENCOUNTER (HCC): Primary | ICD-10-CM

## 2020-01-15 PROBLEM — Z96.652 S/P TKR (TOTAL KNEE REPLACEMENT), LEFT: Status: ACTIVE | Noted: 2020-01-15

## 2020-01-15 LAB
GLUCOSE BLD STRIP.AUTO-MCNC: 102 MG/DL (ref 65–100)
SERVICE CMNT-IMP: ABNORMAL

## 2020-01-15 PROCEDURE — C1713 ANCHOR/SCREW BN/BN,TIS/BN: HCPCS | Performed by: ORTHOPAEDIC SURGERY

## 2020-01-15 PROCEDURE — 77030031139 HC SUT VCRL2 J&J -A: Performed by: ORTHOPAEDIC SURGERY

## 2020-01-15 PROCEDURE — 77030014077 HC TOWER MX CEM J&J -C: Performed by: ORTHOPAEDIC SURGERY

## 2020-01-15 PROCEDURE — 88331 PATH CONSLTJ SURG 1 BLK 1SPC: CPT

## 2020-01-15 PROCEDURE — 77030011640 HC PAD GRND REM COVD -A: Performed by: ORTHOPAEDIC SURGERY

## 2020-01-15 PROCEDURE — 74011250636 HC RX REV CODE- 250/636: Performed by: NURSE ANESTHETIST, CERTIFIED REGISTERED

## 2020-01-15 PROCEDURE — 87205 SMEAR GRAM STAIN: CPT

## 2020-01-15 PROCEDURE — 74011250636 HC RX REV CODE- 250/636: Performed by: ANESTHESIOLOGY

## 2020-01-15 PROCEDURE — 77030018673: Performed by: ORTHOPAEDIC SURGERY

## 2020-01-15 PROCEDURE — 77030006835 HC BLD SAW SAG STRY -B: Performed by: ORTHOPAEDIC SURGERY

## 2020-01-15 PROCEDURE — 76210000006 HC OR PH I REC 0.5 TO 1 HR: Performed by: ORTHOPAEDIC SURGERY

## 2020-01-15 PROCEDURE — 77030014125 HC TY EPDRL BBMI -B: Performed by: ANESTHESIOLOGY

## 2020-01-15 PROCEDURE — 77030003671 HC NDL SPN HAVL -B: Performed by: ANESTHESIOLOGY

## 2020-01-15 PROCEDURE — 0SPD0JZ REMOVAL OF SYNTHETIC SUBSTITUTE FROM LEFT KNEE JOINT, OPEN APPROACH: ICD-10-PCS | Performed by: ORTHOPAEDIC SURGERY

## 2020-01-15 PROCEDURE — 87176 TISSUE HOMOGENIZATION CULTR: CPT

## 2020-01-15 PROCEDURE — 77030040922 HC BLNKT HYPOTHRM STRY -A

## 2020-01-15 PROCEDURE — 74011250636 HC RX REV CODE- 250/636: Performed by: PHYSICIAN ASSISTANT

## 2020-01-15 PROCEDURE — 77030027138 HC INCENT SPIROMETER -A

## 2020-01-15 PROCEDURE — 77030010507 HC ADH SKN DERMBND J&J -B: Performed by: ORTHOPAEDIC SURGERY

## 2020-01-15 PROCEDURE — 74011250637 HC RX REV CODE- 250/637: Performed by: PHYSICIAN ASSISTANT

## 2020-01-15 PROCEDURE — 77030003601 HC NDL NRV BLK BBMI -A

## 2020-01-15 PROCEDURE — C1776 JOINT DEVICE (IMPLANTABLE): HCPCS | Performed by: ORTHOPAEDIC SURGERY

## 2020-01-15 PROCEDURE — 74011000258 HC RX REV CODE- 258: Performed by: NURSE ANESTHETIST, CERTIFIED REGISTERED

## 2020-01-15 PROCEDURE — 77030008462 HC STPLR SKN PROX J&J -A: Performed by: ORTHOPAEDIC SURGERY

## 2020-01-15 PROCEDURE — 76010000174 HC OR TIME 3.5 TO 4 HR INTENSV-TIER 1: Performed by: ORTHOPAEDIC SURGERY

## 2020-01-15 PROCEDURE — 82962 GLUCOSE BLOOD TEST: CPT

## 2020-01-15 PROCEDURE — 77030033067 HC SUT PDO STRATFX SPIR J&J -B: Performed by: ORTHOPAEDIC SURGERY

## 2020-01-15 PROCEDURE — 65270000029 HC RM PRIVATE

## 2020-01-15 PROCEDURE — 88305 TISSUE EXAM BY PATHOLOGIST: CPT

## 2020-01-15 PROCEDURE — 77030018846 HC SOL IRR STRL H20 ICUM -A: Performed by: ORTHOPAEDIC SURGERY

## 2020-01-15 PROCEDURE — 87102 FUNGUS ISOLATION CULTURE: CPT

## 2020-01-15 PROCEDURE — 74011000250 HC RX REV CODE- 250: Performed by: NURSE ANESTHETIST, CERTIFIED REGISTERED

## 2020-01-15 PROCEDURE — 77030002933 HC SUT MCRYL J&J -A: Performed by: ORTHOPAEDIC SURGERY

## 2020-01-15 PROCEDURE — 74011250637 HC RX REV CODE- 250/637: Performed by: ORTHOPAEDIC SURGERY

## 2020-01-15 PROCEDURE — 76060000038 HC ANESTHESIA 3.5 TO 4 HR: Performed by: ORTHOPAEDIC SURGERY

## 2020-01-15 PROCEDURE — 87116 MYCOBACTERIA CULTURE: CPT

## 2020-01-15 PROCEDURE — 77030012935 HC DRSG AQUACEL BMS -B: Performed by: ORTHOPAEDIC SURGERY

## 2020-01-15 PROCEDURE — 77030028907 HC WRP KNEE WO BGS SOLM -B

## 2020-01-15 PROCEDURE — 88311 DECALCIFY TISSUE: CPT

## 2020-01-15 PROCEDURE — 77030002991 HC SUT QUILL SSPC -B: Performed by: ORTHOPAEDIC SURGERY

## 2020-01-15 PROCEDURE — 0SRD0JA REPLACEMENT OF LEFT KNEE JOINT WITH SYNTHETIC SUBSTITUTE, UNCEMENTED, OPEN APPROACH: ICD-10-PCS | Performed by: ORTHOPAEDIC SURGERY

## 2020-01-15 PROCEDURE — 77030018836 HC SOL IRR NACL ICUM -A: Performed by: ORTHOPAEDIC SURGERY

## 2020-01-15 PROCEDURE — 87075 CULTR BACTERIA EXCEPT BLOOD: CPT

## 2020-01-15 PROCEDURE — 74011000250 HC RX REV CODE- 250: Performed by: ORTHOPAEDIC SURGERY

## 2020-01-15 DEVICE — IMPLANTABLE DEVICE
Type: IMPLANTABLE DEVICE | Site: KNEE | Status: FUNCTIONAL
Brand: PERSONA®

## 2020-01-15 DEVICE — IMPLANTABLE DEVICE
Type: IMPLANTABLE DEVICE | Site: KNEE | Status: FUNCTIONAL
Brand: PERSONA® TRABECULAR METAL®

## 2020-01-15 DEVICE — SMARTSET GMV HIGH PERFORMANCE GENTAMICIN MEDIUM VISCOSITY BONE CEMENT 40G
Type: IMPLANTABLE DEVICE | Site: KNEE | Status: FUNCTIONAL
Brand: SMARTSET

## 2020-01-15 DEVICE — IMPLANTABLE DEVICE
Type: IMPLANTABLE DEVICE | Site: KNEE | Status: FUNCTIONAL
Brand: PERSONA® VIVACIT-E®

## 2020-01-15 RX ORDER — FENTANYL CITRATE 50 UG/ML
25 INJECTION, SOLUTION INTRAMUSCULAR; INTRAVENOUS
Status: DISCONTINUED | OUTPATIENT
Start: 2020-01-15 | End: 2020-01-15 | Stop reason: HOSPADM

## 2020-01-15 RX ORDER — PROPOFOL 10 MG/ML
INJECTION, EMULSION INTRAVENOUS AS NEEDED
Status: DISCONTINUED | OUTPATIENT
Start: 2020-01-15 | End: 2020-01-15 | Stop reason: HOSPADM

## 2020-01-15 RX ORDER — AMOXICILLIN 250 MG
1 CAPSULE ORAL 2 TIMES DAILY
Status: DISCONTINUED | OUTPATIENT
Start: 2020-01-15 | End: 2020-01-17 | Stop reason: HOSPADM

## 2020-01-15 RX ORDER — CEFAZOLIN SODIUM/WATER 2 G/20 ML
2 SYRINGE (ML) INTRAVENOUS ONCE
Status: DISCONTINUED | OUTPATIENT
Start: 2020-01-15 | End: 2020-01-15 | Stop reason: HOSPADM

## 2020-01-15 RX ORDER — MIDAZOLAM HYDROCHLORIDE 1 MG/ML
INJECTION, SOLUTION INTRAMUSCULAR; INTRAVENOUS AS NEEDED
Status: DISCONTINUED | OUTPATIENT
Start: 2020-01-15 | End: 2020-01-15 | Stop reason: HOSPADM

## 2020-01-15 RX ORDER — FENTANYL CITRATE 50 UG/ML
INJECTION, SOLUTION INTRAMUSCULAR; INTRAVENOUS AS NEEDED
Status: DISCONTINUED | OUTPATIENT
Start: 2020-01-15 | End: 2020-01-15 | Stop reason: HOSPADM

## 2020-01-15 RX ORDER — ONDANSETRON 2 MG/ML
4 INJECTION INTRAMUSCULAR; INTRAVENOUS AS NEEDED
Status: DISCONTINUED | OUTPATIENT
Start: 2020-01-15 | End: 2020-01-15 | Stop reason: HOSPADM

## 2020-01-15 RX ORDER — ACETAMINOPHEN 500 MG
1000 TABLET ORAL ONCE
Status: COMPLETED | OUTPATIENT
Start: 2020-01-15 | End: 2020-01-15

## 2020-01-15 RX ORDER — ROPIVACAINE HYDROCHLORIDE 5 MG/ML
INJECTION, SOLUTION EPIDURAL; INFILTRATION; PERINEURAL
Status: COMPLETED | OUTPATIENT
Start: 2020-01-15 | End: 2020-01-15

## 2020-01-15 RX ORDER — SODIUM CHLORIDE, SODIUM LACTATE, POTASSIUM CHLORIDE, CALCIUM CHLORIDE 600; 310; 30; 20 MG/100ML; MG/100ML; MG/100ML; MG/100ML
50 INJECTION, SOLUTION INTRAVENOUS CONTINUOUS
Status: DISCONTINUED | OUTPATIENT
Start: 2020-01-15 | End: 2020-01-15 | Stop reason: HOSPADM

## 2020-01-15 RX ORDER — CELECOXIB 200 MG/1
200 CAPSULE ORAL ONCE
Status: COMPLETED | OUTPATIENT
Start: 2020-01-15 | End: 2020-01-15

## 2020-01-15 RX ORDER — PROPOFOL 10 MG/ML
INJECTION, EMULSION INTRAVENOUS
Status: DISCONTINUED | OUTPATIENT
Start: 2020-01-15 | End: 2020-01-15 | Stop reason: HOSPADM

## 2020-01-15 RX ORDER — HYDROMORPHONE HYDROCHLORIDE 1 MG/ML
0.2 INJECTION, SOLUTION INTRAMUSCULAR; INTRAVENOUS; SUBCUTANEOUS
Status: DISCONTINUED | OUTPATIENT
Start: 2020-01-15 | End: 2020-01-15 | Stop reason: HOSPADM

## 2020-01-15 RX ORDER — SODIUM CHLORIDE 0.9 % (FLUSH) 0.9 %
5-40 SYRINGE (ML) INJECTION AS NEEDED
Status: DISCONTINUED | OUTPATIENT
Start: 2020-01-15 | End: 2020-01-17 | Stop reason: HOSPADM

## 2020-01-15 RX ORDER — ASPIRIN 81 MG/1
81 TABLET ORAL 2 TIMES DAILY
Qty: 60 TAB | Refills: 0 | Status: SHIPPED | OUTPATIENT
Start: 2020-01-15 | End: 2020-05-08

## 2020-01-15 RX ORDER — HYDROMORPHONE HYDROCHLORIDE 1 MG/ML
0.5 INJECTION, SOLUTION INTRAMUSCULAR; INTRAVENOUS; SUBCUTANEOUS
Status: ACTIVE | OUTPATIENT
Start: 2020-01-15 | End: 2020-01-16

## 2020-01-15 RX ORDER — SODIUM CHLORIDE 0.9 % (FLUSH) 0.9 %
5-40 SYRINGE (ML) INJECTION EVERY 8 HOURS
Status: DISCONTINUED | OUTPATIENT
Start: 2020-01-15 | End: 2020-01-15 | Stop reason: HOSPADM

## 2020-01-15 RX ORDER — HYDROXYZINE HYDROCHLORIDE 10 MG/1
10 TABLET, FILM COATED ORAL
Status: DISCONTINUED | OUTPATIENT
Start: 2020-01-15 | End: 2020-01-17 | Stop reason: HOSPADM

## 2020-01-15 RX ORDER — DEXAMETHASONE SODIUM PHOSPHATE 4 MG/ML
INJECTION, SOLUTION INTRA-ARTICULAR; INTRALESIONAL; INTRAMUSCULAR; INTRAVENOUS; SOFT TISSUE AS NEEDED
Status: DISCONTINUED | OUTPATIENT
Start: 2020-01-15 | End: 2020-01-15 | Stop reason: HOSPADM

## 2020-01-15 RX ORDER — SODIUM CHLORIDE 9 MG/ML
125 INJECTION, SOLUTION INTRAVENOUS CONTINUOUS
Status: DISPENSED | OUTPATIENT
Start: 2020-01-15 | End: 2020-01-16

## 2020-01-15 RX ORDER — ACETAMINOPHEN 325 MG/1
650 TABLET ORAL ONCE
Status: DISCONTINUED | OUTPATIENT
Start: 2020-01-15 | End: 2020-01-15 | Stop reason: HOSPADM

## 2020-01-15 RX ORDER — SODIUM CHLORIDE 0.9 % (FLUSH) 0.9 %
5-40 SYRINGE (ML) INJECTION AS NEEDED
Status: DISCONTINUED | OUTPATIENT
Start: 2020-01-15 | End: 2020-01-15 | Stop reason: HOSPADM

## 2020-01-15 RX ORDER — FACIAL-BODY WIPES
10 EACH TOPICAL DAILY PRN
Status: DISCONTINUED | OUTPATIENT
Start: 2020-01-17 | End: 2020-01-17 | Stop reason: HOSPADM

## 2020-01-15 RX ORDER — ACETAMINOPHEN 500 MG/1
500 CAPSULE, LIQUID FILLED ORAL
Qty: 100 CAP | Refills: 0 | Status: SHIPPED
Start: 2020-01-15

## 2020-01-15 RX ORDER — OXYCODONE HYDROCHLORIDE 5 MG/1
2.5-5 TABLET ORAL
Qty: 42 TAB | Refills: 0 | Status: SHIPPED | OUTPATIENT
Start: 2020-01-15 | End: 2020-01-22

## 2020-01-15 RX ORDER — LISINOPRIL 10 MG/1
20 TABLET ORAL DAILY
Status: DISCONTINUED | OUTPATIENT
Start: 2020-01-16 | End: 2020-01-17 | Stop reason: HOSPADM

## 2020-01-15 RX ORDER — SODIUM CHLORIDE, SODIUM LACTATE, POTASSIUM CHLORIDE, CALCIUM CHLORIDE 600; 310; 30; 20 MG/100ML; MG/100ML; MG/100ML; MG/100ML
INJECTION, SOLUTION INTRAVENOUS
Status: DISCONTINUED | OUTPATIENT
Start: 2020-01-15 | End: 2020-01-15 | Stop reason: HOSPADM

## 2020-01-15 RX ORDER — MIDAZOLAM HYDROCHLORIDE 1 MG/ML
1 INJECTION, SOLUTION INTRAMUSCULAR; INTRAVENOUS AS NEEDED
Status: DISCONTINUED | OUTPATIENT
Start: 2020-01-15 | End: 2020-01-15 | Stop reason: HOSPADM

## 2020-01-15 RX ORDER — ALPRAZOLAM 0.25 MG/1
0.5 TABLET ORAL
Status: DISCONTINUED | OUTPATIENT
Start: 2020-01-15 | End: 2020-01-17 | Stop reason: HOSPADM

## 2020-01-15 RX ORDER — ACETAMINOPHEN 500 MG
500 TABLET ORAL
Status: DISCONTINUED | OUTPATIENT
Start: 2020-01-15 | End: 2020-01-17 | Stop reason: HOSPADM

## 2020-01-15 RX ORDER — LIDOCAINE HYDROCHLORIDE 10 MG/ML
0.1 INJECTION, SOLUTION EPIDURAL; INFILTRATION; INTRACAUDAL; PERINEURAL AS NEEDED
Status: DISCONTINUED | OUTPATIENT
Start: 2020-01-15 | End: 2020-01-15 | Stop reason: HOSPADM

## 2020-01-15 RX ORDER — ONDANSETRON 2 MG/ML
INJECTION INTRAMUSCULAR; INTRAVENOUS AS NEEDED
Status: DISCONTINUED | OUTPATIENT
Start: 2020-01-15 | End: 2020-01-15 | Stop reason: HOSPADM

## 2020-01-15 RX ORDER — CEFAZOLIN SODIUM 1 G/3ML
INJECTION, POWDER, FOR SOLUTION INTRAMUSCULAR; INTRAVENOUS AS NEEDED
Status: DISCONTINUED | OUTPATIENT
Start: 2020-01-15 | End: 2020-01-15 | Stop reason: HOSPADM

## 2020-01-15 RX ORDER — ASPIRIN 81 MG/1
81 TABLET ORAL 2 TIMES DAILY
Status: DISCONTINUED | OUTPATIENT
Start: 2020-01-16 | End: 2020-01-17 | Stop reason: HOSPADM

## 2020-01-15 RX ORDER — OXYCODONE HYDROCHLORIDE 5 MG/1
2.5 TABLET ORAL
Status: DISCONTINUED | OUTPATIENT
Start: 2020-01-15 | End: 2020-01-17 | Stop reason: HOSPADM

## 2020-01-15 RX ORDER — PREGABALIN 75 MG/1
75 CAPSULE ORAL ONCE
Status: COMPLETED | OUTPATIENT
Start: 2020-01-15 | End: 2020-01-15

## 2020-01-15 RX ORDER — POLYETHYLENE GLYCOL 3350 17 G/17G
17 POWDER, FOR SOLUTION ORAL DAILY
Status: DISCONTINUED | OUTPATIENT
Start: 2020-01-16 | End: 2020-01-17 | Stop reason: HOSPADM

## 2020-01-15 RX ORDER — AMOXICILLIN 250 MG
1 CAPSULE ORAL
Qty: 60 TAB | Refills: 0 | Status: SHIPPED | OUTPATIENT
Start: 2020-01-15 | End: 2020-05-08

## 2020-01-15 RX ORDER — PAROXETINE HYDROCHLORIDE 20 MG/1
40 TABLET, FILM COATED ORAL DAILY
Status: DISCONTINUED | OUTPATIENT
Start: 2020-01-16 | End: 2020-01-17 | Stop reason: HOSPADM

## 2020-01-15 RX ORDER — KETOROLAC TROMETHAMINE 30 MG/ML
15 INJECTION, SOLUTION INTRAMUSCULAR; INTRAVENOUS EVERY 6 HOURS
Status: COMPLETED | OUTPATIENT
Start: 2020-01-15 | End: 2020-01-16

## 2020-01-15 RX ORDER — PANTOPRAZOLE SODIUM 40 MG/1
40 TABLET, DELAYED RELEASE ORAL
Status: DISCONTINUED | OUTPATIENT
Start: 2020-01-16 | End: 2020-01-17 | Stop reason: HOSPADM

## 2020-01-15 RX ORDER — FENTANYL CITRATE 50 UG/ML
50 INJECTION, SOLUTION INTRAMUSCULAR; INTRAVENOUS AS NEEDED
Status: DISCONTINUED | OUTPATIENT
Start: 2020-01-15 | End: 2020-01-15 | Stop reason: HOSPADM

## 2020-01-15 RX ORDER — OXYCODONE HYDROCHLORIDE 5 MG/1
5 TABLET ORAL
Status: DISCONTINUED | OUTPATIENT
Start: 2020-01-15 | End: 2020-01-17 | Stop reason: HOSPADM

## 2020-01-15 RX ORDER — SODIUM CHLORIDE 0.9 % (FLUSH) 0.9 %
5-40 SYRINGE (ML) INJECTION EVERY 8 HOURS
Status: DISCONTINUED | OUTPATIENT
Start: 2020-01-15 | End: 2020-01-17 | Stop reason: HOSPADM

## 2020-01-15 RX ORDER — ONDANSETRON 2 MG/ML
4 INJECTION INTRAMUSCULAR; INTRAVENOUS
Status: ACTIVE | OUTPATIENT
Start: 2020-01-15 | End: 2020-01-16

## 2020-01-15 RX ORDER — CEFAZOLIN SODIUM/WATER 2 G/20 ML
2 SYRINGE (ML) INTRAVENOUS EVERY 8 HOURS
Status: COMPLETED | OUTPATIENT
Start: 2020-01-15 | End: 2020-01-16

## 2020-01-15 RX ORDER — NALOXONE HYDROCHLORIDE 0.4 MG/ML
0.4 INJECTION, SOLUTION INTRAMUSCULAR; INTRAVENOUS; SUBCUTANEOUS AS NEEDED
Status: DISCONTINUED | OUTPATIENT
Start: 2020-01-15 | End: 2020-01-17 | Stop reason: HOSPADM

## 2020-01-15 RX ADMIN — MIDAZOLAM 1 MG: 1 INJECTION INTRAMUSCULAR; INTRAVENOUS at 12:46

## 2020-01-15 RX ADMIN — SODIUM CHLORIDE, SODIUM LACTATE, POTASSIUM CHLORIDE, AND CALCIUM CHLORIDE 50 ML/HR: 600; 310; 30; 20 INJECTION, SOLUTION INTRAVENOUS at 11:48

## 2020-01-15 RX ADMIN — ACETAMINOPHEN 500 MG: 500 TABLET ORAL at 21:54

## 2020-01-15 RX ADMIN — SODIUM CHLORIDE, POTASSIUM CHLORIDE, SODIUM LACTATE AND CALCIUM CHLORIDE: 600; 310; 30; 20 INJECTION, SOLUTION INTRAVENOUS at 14:00

## 2020-01-15 RX ADMIN — SODIUM CHLORIDE 125 ML/HR: 900 INJECTION, SOLUTION INTRAVENOUS at 16:43

## 2020-01-15 RX ADMIN — FENTANYL CITRATE 25 MCG: 50 INJECTION, SOLUTION INTRAMUSCULAR; INTRAVENOUS at 15:00

## 2020-01-15 RX ADMIN — PREGABALIN 75 MG: 75 CAPSULE ORAL at 11:37

## 2020-01-15 RX ADMIN — FENTANYL CITRATE 100 MCG: 50 INJECTION, SOLUTION INTRAMUSCULAR; INTRAVENOUS at 12:05

## 2020-01-15 RX ADMIN — CEFAZOLIN 2 G: 330 INJECTION, POWDER, FOR SOLUTION INTRAMUSCULAR; INTRAVENOUS at 12:46

## 2020-01-15 RX ADMIN — ACETAMINOPHEN 500 MG: 500 TABLET ORAL at 18:50

## 2020-01-15 RX ADMIN — TRANEXAMIC ACID 1 G: 100 INJECTION, SOLUTION INTRAVENOUS at 13:17

## 2020-01-15 RX ADMIN — ACETAMINOPHEN 1000 MG: 500 TABLET ORAL at 11:36

## 2020-01-15 RX ADMIN — SENNOSIDES AND DOCUSATE SODIUM 1 TABLET: 8.6; 5 TABLET ORAL at 18:50

## 2020-01-15 RX ADMIN — ROPIVACAINE HYDROCHLORIDE 10 ML: 5 INJECTION, SOLUTION EPIDURAL; INFILTRATION; PERINEURAL at 12:13

## 2020-01-15 RX ADMIN — MIDAZOLAM 2 MG: 1 INJECTION INTRAMUSCULAR; INTRAVENOUS at 12:39

## 2020-01-15 RX ADMIN — ONDANSETRON HYDROCHLORIDE 4 MG: 2 INJECTION, SOLUTION INTRAMUSCULAR; INTRAVENOUS at 16:03

## 2020-01-15 RX ADMIN — OXYCODONE 5 MG: 5 TABLET ORAL at 23:37

## 2020-01-15 RX ADMIN — FENTANYL CITRATE 25 MCG: 50 INJECTION, SOLUTION INTRAMUSCULAR; INTRAVENOUS at 15:52

## 2020-01-15 RX ADMIN — CELECOXIB 200 MG: 200 CAPSULE ORAL at 11:37

## 2020-01-15 RX ADMIN — Medication 2 G: at 19:56

## 2020-01-15 RX ADMIN — MIDAZOLAM 1 MG: 1 INJECTION INTRAMUSCULAR; INTRAVENOUS at 12:55

## 2020-01-15 RX ADMIN — MIDAZOLAM 2 MG: 1 INJECTION INTRAMUSCULAR; INTRAVENOUS at 12:44

## 2020-01-15 RX ADMIN — Medication 10 ML: at 18:20

## 2020-01-15 RX ADMIN — PROPOFOL 50 MG: 10 INJECTION, EMULSION INTRAVENOUS at 13:25

## 2020-01-15 RX ADMIN — KETOROLAC TROMETHAMINE 15 MG: 30 INJECTION, SOLUTION INTRAMUSCULAR at 20:42

## 2020-01-15 RX ADMIN — FENTANYL CITRATE 25 MCG: 50 INJECTION, SOLUTION INTRAMUSCULAR; INTRAVENOUS at 12:50

## 2020-01-15 RX ADMIN — MIDAZOLAM 1 MG: 1 INJECTION INTRAMUSCULAR; INTRAVENOUS at 13:19

## 2020-01-15 RX ADMIN — FENTANYL CITRATE 25 MCG: 50 INJECTION, SOLUTION INTRAMUSCULAR; INTRAVENOUS at 14:18

## 2020-01-15 RX ADMIN — SODIUM CHLORIDE, POTASSIUM CHLORIDE, SODIUM LACTATE AND CALCIUM CHLORIDE: 600; 310; 30; 20 INJECTION, SOLUTION INTRAVENOUS at 12:39

## 2020-01-15 RX ADMIN — DEXAMETHASONE SODIUM PHOSPHATE 8 MG: 4 INJECTION, SOLUTION INTRAMUSCULAR; INTRAVENOUS at 16:03

## 2020-01-15 RX ADMIN — PROPOFOL 40 MCG/KG/MIN: 10 INJECTION, EMULSION INTRAVENOUS at 12:50

## 2020-01-15 RX ADMIN — MIDAZOLAM 2 MG: 1 INJECTION INTRAMUSCULAR; INTRAVENOUS at 12:05

## 2020-01-15 RX ADMIN — OXYCODONE 5 MG: 5 TABLET ORAL at 20:42

## 2020-01-15 NOTE — ANESTHESIA POSTPROCEDURE EVALUATION
Post-Anesthesia Evaluation and Assessment    Patient: Mode Jonas MRN: [id number]  SSN: [id number]    YOB: 1960  Age: 62 y.o. Sex: female      I have evaluated the patient and they are stable and ready for discharge from the PACU. Cardiovascular Function/Vital Signs  Visit Vitals  /68   Pulse 68   Temp 36 °C (96.8 °F)   Resp 10   Ht 5' 7.5\" (1.715 m)   Wt 110.5 kg (243 lb 8 oz)   SpO2 100%   BMI 37.57 kg/m²       Patient is status post Spinal anesthesia for Procedure(s):  REVISION OF LEFT TOTAL KNEE ARTHROPLASTY (SPINAL WITH IV SEDATION/BLOCK). Nausea/Vomiting: None    Postoperative hydration reviewed and adequate. Pain:  Pain Scale 1: Numeric (0 - 10) (01/15/20 1119)  Pain Intensity 1: 0 (01/15/20 1119)   Managed    Neurological Status:   Neuro (WDL): Within Defined Limits (01/15/20 1128)   At baseline    Mental Status, Level of Consciousness: Alert and  oriented to person, place, and time    Pulmonary Status:   O2 Device: Room air (01/15/20 1614)   Adequate oxygenation and airway patent    Complications related to anesthesia: None    Post-anesthesia assessment completed. No concerns    Signed By: Zurdo Krishnamurthy MD     January 15, 2020              Procedure(s):  REVISION OF LEFT TOTAL KNEE ARTHROPLASTY (SPINAL WITH IV SEDATION/BLOCK). spinal, MAC    <BSHSIANPOST>    Vitals Value Taken Time   /63 1/15/2020  4:20 PM   Temp 36 °C (96.8 °F) 1/15/2020  4:14 PM   Pulse 67 1/15/2020  4:23 PM   Resp 10 1/15/2020  4:23 PM   SpO2 100 % 1/15/2020  4:23 PM   Vitals shown include unvalidated device data.

## 2020-01-15 NOTE — PERIOP NOTES
TRANSFER - OUT REPORT:    Verbal report given to 5 S RN Adwoa(name) on Aubrie Mix  being transferred to 565(unit) for routine post - op       Report consisted of patients Situation, Background, Assessment and   Recommendations(SBAR). Time Pre op antibiotic given:1246  Anesthesia Stop time: 7109  Chester Present on Transfer to floor:n  Order for Chester on Chart:n  Discharge Prescriptions with Chart:yes, Dinah, Pericolace, ASA    Information from the following report(s) SBAR, OR Summary, Intake/Output, MAR and Cardiac Rhythm NSR was reviewed with the receiving nurse. Opportunity for questions and clarification was provided. Is the patient on 02? NO       L/Min        Other     Is the patient on a monitor? NO    Is the nurse transporting with the patient? NO    Surgical Waiting Area notified of patient's transfer from PACU? YES, via volunteer Gaytan Congress      The following personal items collected during your admission accompanied patient upon transfer:   Dental Appliance: Dental Appliances: Other (comment)(dental implant and bridges)  Vision:    Hearing Aid:    Jewelry:    Clothing: Clothing: (clothing bag placed on frame of pt's stretcher in pacu)  Other Valuables:  Other Valuables: (specs in case placed inside pt's clothing bag in pacu)  Valuables sent to safe:

## 2020-01-15 NOTE — DISCHARGE INSTRUCTIONS
Post-op Discharge Instructions Following Total Joint Replacement  Jose Angel Rivera MD  [address]  [phone], ext [phone]  Follow-up Office Visit   See Dr. Eloise Ricardo approximately 3-4 weeks from date of surgery. Call [phone], ext [phone] to make an appointment. Activity   Use your walker for ambulation. Weight bearing as tolerated unless instructed otherwise by the physical therapist. Get up every hour you are awake and take a brief walk. Lengthen walking distance daily as your strength improves.  Continue using your walker until seen in the office for your first follow up visit.  Practice your exercises 3 times daily as instructed by the physical therapist. Samuel Barros for 20 minutes after exercising.  No driving until seen in the office for your first follow up visit. Incision Care   The light brown Aquacel surgical dressing is waterproof and is to remain on your incision for 7 days. On the 7th day, carefully lift the edge of the dressing to break the adhesive seal and gently peel it off.  If your Aquacel dressings comes loose or falls off before the 7th day, replace it with a dry sterile gauze dressing and change this dressing daily. Once there is no drainage on the bandage, you mean leave the incision open to air.  You may take a shower with the Aquacel dressing in place. After you remove the Aquacel dressing on day 7, you may continue to shower and get your incision wet in the shower. Do not submerge your incision under water in a bathtub, hot tub, swimming pool, etc. until after you have been evaluated at your first office visit. Medications   Blood Clot Prevention: Take medication as prescribed by your physician for 4 weeks postop.  Pain Management: Take pain medication as prescribed; wean yourself off of pain medication as your pain lessens. Take with food.  You make also take Tylenol every 4-6 hours as needed for pain. Do not exceed 3 grams (3000mg) per day.    Place an ice bag on or around the incision for 20 minutes on / 20 minutes off as needed throughout the day and night, especially after exercising.  Stool Softener: You may want to take a stool softener (such as Senokot-S or Colace) to prevent constipation while taking pain medication. If constipation occurs, you may also use a laxative (such as Dulcolax tablets, Miralax, or a suppository). Diet   Resume usual diet at home. Drink plenty of fluids. Eat foods high in fiber and protein. Calcium and Vitamin D supplements recommended. Avoid alcoholic beverages. No smoking. When to call your Orthopaedic Surgeon: If you call after 5pm or on a weekend, the on call physician will return your call   Pain that is not relieved by pain medication, ice, and activity modification   Signs of infection (red incision, continuous drainage from the incision, malodorous drainage, persistent fever greater than 101 degrees Fahrenheit)   Signs of a blood clot in your leg (calf pain, tenderness, redness, and/or swelling of the lower leg)  ?   When to call your Primary Care Physician   Concerns about your medical conditions such as diabetes, high blood pressure, asthma, congestive heart failure   Call if blood sugars are elevated, if you have a persistent headache or dizziness, coughing or congestion, constipation or diarrhea, burning with urination, abnormal heart rate (fast or slow)  When to call 911 and go to the nearest Emergency Room   Acute onset of chest pain, shortness of breath, difficulty breathing

## 2020-01-15 NOTE — ANESTHESIA PREPROCEDURE EVALUATION
Relevant Problems   No relevant active problems       Anesthetic History   No history of anesthetic complications            Review of Systems / Medical History  Patient summary reviewed, nursing notes reviewed and pertinent labs reviewed    Pulmonary  Within defined limits                 Neuro/Psych   Within defined limits           Cardiovascular  Within defined limits  Hypertension                   GI/Hepatic/Renal  Within defined limits   GERD           Endo/Other  Within defined limits      Obesity     Other Findings              Physical Exam    Airway  Mallampati: II  TM Distance: > 6 cm  Neck ROM: normal range of motion   Mouth opening: Normal     Cardiovascular  Regular rate and rhythm,  S1 and S2 normal,  no murmur, click, rub, or gallop             Dental  No notable dental hx       Pulmonary  Breath sounds clear to auscultation               Abdominal  GI exam deferred       Other Findings            Anesthetic Plan    ASA: 2  Anesthesia type: spinal and MAC      Post-op pain plan if not by surgeon: peripheral nerve block single    Induction: Intravenous  Anesthetic plan and risks discussed with: Patient

## 2020-01-15 NOTE — BRIEF OP NOTE
BRIEF OPERATIVE NOTE    Date of Procedure: 1/15/2020   Preoperative Diagnosis: STATUS POST LEFT TOTAL KNEE ARTHROPLASTY, MECHANICAL LOOSENING OF PROSTHETIC KNEE  Postoperative Diagnosis: STATUS POST LEFT TOTAL KNEE ARTHROPLASTY, MECHANICAL LOOSENING OF PROSTHETIC KNEE    Procedure(s):  REVISION OF LEFT TOTAL KNEE ARTHROPLASTY (SPINAL WITH IV SEDATION/BLOCK)  Surgeon(s) and Role:     Rafy Elias MD - Primary         Surgical Assistant: Betina Jaime PA-C     Surgical Staff:  Circ-1: Nj Garcia RN  Circ-Relief: Rosita Leblanc RN  Physician Assistant: Guillermo Mohr PA-C  Scrub Tech-1: Umair Hightower  Scrub RN-Relief: Susu Hernandez RN  Surg Asst-1: Jose [facility]  Float Staff: Philip Estes RN  Event Time In Time Out   Incision Start 1316    Incision Close       Anesthesia: Spinal   Estimated Blood Loss: 100cc  Specimens:   ID Type Source Tests Collected by Time Destination   1 : LEFT KNEE SYNOVIUM Frozen Section Knee, left  Lukas Rodriguez MD 1/15/2020 1340 Pathology   1 : C=S LEFT KNEE SYNOVIAL FLUID Joint Fluid Joint, Knee CULTURE, ANAEROBIC, CULTURE, BODY FLUID, GRAM STAIN, CULTURE & SMEAR, AFB, CULTURE, Maricruz Cerna MD 1/15/2020 1330 Microbiology   2 : LEFT KNEE SYNOVIUM Tissue Knee, left CULTURE, ANAEROBIC, CULTURE, TISSUE W GRAM STAIN, CULTURE & SMEAR, AFB, CULTURE, Maricruz Cerna MD 1/15/2020 1331 Microbiology   3 : LEFT KNEE TIBIAL INTERFACE SYNOVIUM Tissue Knee, left CULTURE, ANAEROBIC, CULTURE, TISSUE W GRAM STAIN, CULTURE & SMEAR, AFB, CULTURE, Maricruz Cerna MD 1/15/2020 1348 Microbiology   4 : LEFT KNEE TIBIAL INTERFACE SYNOVIUM (#2) Tissue Knee, left CULTURE, ANAEROBIC, CULTURE, TISSUE W GRAM STAIN, CULTURE & SMEAR, AFB, CULTURE, Maricruz Cerna MD 1/15/2020 1348 Microbiology   5 :     Lukas Rodriguez MD 1/15/2020 1425 Microbiology      Findings: loose tibial component from prior TKA   Complications: none  Implants:   Implant Name Type Inv. Item Serial No.  Lot No. LRB No. Used Action   CEMENT BNE GENTAMC MV 40GM -- SMARTSET ENDURANCE - SNA  CEMENT BNE GENTAMC MV 40GM -- SMARTSET ENDURANCE NA [facility] [id number] Left 1 Implanted   CEMENT BNE GENTAMC MV 40GM -- SMARTSET ENDURANCE - SNA  CEMENT BNE GENTAMC MV 40GM -- SMARTSET ENDURANCE NA [facility] [id number] Left 1 Implanted   BREANA FEMORAL POSTERIOR AUGMENT SIZE 7, 7+ 5 MM THICKNESS   NA  [id number] Left 1 Implanted   INSERT ASF PS 18MM VE L 6-9 CD -- PERSONA - SNA  INSERT ASF PS 18MM VE L 6-9 CD -- PERSONA NA [employer] [id number] Left 1 Implanted   BREANA FEMORAL DISTAL AUGMENT SIZE 7, 7+ 5 MM THICKNESS   NA  [id number] Left 1 Implanted   BREANA FEMORAL DISTAL AUGMENT 7, 7+ 5 MM THICKNESS    NA  [id number] Left 1 Implanted   BREANA TIBIAL AUGMENT SIZE CD5 MM THICKNESS   NA  [id number] Left 1 Implanted   BREANA TIBIAL AUGMENT SIZE CD, 5 MM THICKNESS    NA  [id number] Left 1 Implanted   BREANA STEM EXTENSION STRAIGHT 13 MM DIAM,+135 MM LENGTH   NA  [id number] Left 1 Implanted   BREANA STEM EXTENSION STRAIGHT 14 MM DIAM +135 MM LENGTH    NA  [id number] Left 1 Implanted   BREANA CENTRAL CONE    NA  [id number] Left 1 Implanted   BREANA FIXED TIBIALEFT SIZE D    NA  [id number] Left 1 Implanted   BREANA STANDARD LEFT SIZE 7    NA  [id number] Left 1 Implanted   BREANA FEMOAL DISTAL AUGMENT SIZE 7, 7+ 5 MM THICKNESS    NA  [id number] Left 1 Implanted   BREANA FEMORAL DISTAL AUGMENT SIZE 7, 7+ 5 MM THICKNESS    NA  [id number] Left 1 Implanted

## 2020-01-15 NOTE — ANESTHESIA PROCEDURE NOTES
Peripheral Block    Start time: 1/15/2020 12:01 PM  End time: 1/15/2020 12:06 PM  Performed by: Ramrio Brooks MD  Authorized by: Ramiro Brooks MD       Pre-procedure: Indications: at surgeon's request, post-op pain management, procedure for pain and primary anesthetic    Preanesthetic Checklist: patient identified, risks and benefits discussed, site marked, timeout performed, anesthesia consent given and patient being monitored      Block Type:   Block Type:   Adductor canal  Laterality:  Left  Monitoring:  Responsive to questions, standard ASA monitoring, continuous pulse ox, oxygen, frequent vital sign checks and heart rate  Injection Technique:  Single shot  Procedures: ultrasound guided    Prep: chlorhexidine    Needle Type:  Stimuplex  Needle Gauge:  22 G    Assessment:    Injection Assessment:  Incremental injection every 5 mL, no paresthesia, no intravascular symptoms, negative aspiration for blood and local visualized surrounding nerve on ultrasound  Patient tolerance:  Patient tolerated the procedure well with no immediate complications

## 2020-01-15 NOTE — DISCHARGE SUMMARY
[address]     DISCHARGE SUMMARY     Name: Jen Gonzales       MR#: [id number]    : 1960  ADMIT DATE: 1/15/2020  DISCHARGE DATE: 2020     ADMISSION DIAGNOSIS: Mechanical loosening of prosthetic knee (HCC) [T84.038A, Z96.659]  S/P TKR (total knee replacement), left [Z96.652]     DISCHARGE DIAGNOSIS: STATUS POST LEFT TOTAL KNEE ARTHROPLASTY, MECHANICAL LOOSENING OF PROSTHETIC KNEE     PROCEDURE PERFORMED: Procedure(s):  REVISION OF LEFT TOTAL KNEE ARTHROPLASTY (SPINAL WITH IV SEDATION/BLOCK)     CONSULTATIONS:  None.     HISTORY OF PRESENT ILLNESS: The patient is a 67-year-old female who is more than a few years out from left primary total knee replacement. She is doing very well, but recently had progressive diffuse left knee pain with activities steadily worsening. Radiographs demonstrate evidence of loosening of the tibial component. Infection workup demonstrated slightly elevated sed rate, normal C-reactive protein, aspiration demonstrating negative cultures and approximately 12,000 white cells with less than 50% neutrophils. She presents for revision of her left total knee. Risks, benefits, and alternatives of procedure were reviewed with her in detail and she desires to proceed.     HOSPITAL COURSE:  The patient underwent the aforementioned procedure on date of admission under spinal anesthesia with adductor canal block. There were no immediate postoperative complications. She was started on a multimodal pain regimen and DVT prophylaxis.     DISPOSITION: The patient made slow, steady progress with physical therapy and was appropriate for discharge to Home in stable condition on postoperative day 2. DISCHARGE MEDICATIONS:  Reinitiate preadmission medications. In addition, the patient will be on ASA for DVT prophylaxis and low dose oxycodone and Tylenol for pain. DISCHARGE INSTRUCTIONS:  Detailed printed instructions were provided to the patient.   Follow up with  Wild in approximately 3 weeks. The patient will receive home health physical therapy in the meantime.     Signed by: Yissel West PA-C  1/20/2020

## 2020-01-15 NOTE — PERIOP NOTES
Patient: Kalia Tarango MRN: [id number]  SSN: [id number]   YOB: 1960  Age: 62 y.o. Sex: female     Patient is status post Procedure(s):  REVISION OF LEFT TOTAL KNEE ARTHROPLASTY (SPINAL WITH IV SEDATION/BLOCK). Surgeon(s) and Role:     Christen Sandhoff, MD - Primary    Local/Dose/Irrigation:                   Peripheral IV 01/15/20 Left Hand (Active)                           Dressing/Packing:  Wound Knee Left-Dressing Type: Aquacel; Cast padding;Elastic bandage; Topical skin adhesive/glue (01/15/20 1500)    Splint/Cast:  ]    Other:

## 2020-01-16 ENCOUNTER — HOME HEALTH ADMISSION (OUTPATIENT)
Dept: HOME HEALTH SERVICES | Facility: HOME HEALTH | Age: 60
End: 2020-01-16
Payer: COMMERCIAL

## 2020-01-16 LAB
ANION GAP SERPL CALC-SCNC: 4 MMOL/L (ref 5–15)
BUN SERPL-MCNC: 13 MG/DL (ref 6–20)
BUN/CREAT SERPL: 17 (ref 12–20)
CALCIUM SERPL-MCNC: 8.4 MG/DL (ref 8.5–10.1)
CHLORIDE SERPL-SCNC: 109 MMOL/L (ref 97–108)
CO2 SERPL-SCNC: 25 MMOL/L (ref 21–32)
CREAT SERPL-MCNC: 0.75 MG/DL (ref 0.55–1.02)
GLUCOSE SERPL-MCNC: 139 MG/DL (ref 65–100)
HGB BLD-MCNC: 10.3 G/DL (ref 11.5–16)
POTASSIUM SERPL-SCNC: 4.6 MMOL/L (ref 3.5–5.1)
SODIUM SERPL-SCNC: 138 MMOL/L (ref 136–145)

## 2020-01-16 PROCEDURE — 80048 BASIC METABOLIC PNL TOTAL CA: CPT

## 2020-01-16 PROCEDURE — 36415 COLL VENOUS BLD VENIPUNCTURE: CPT

## 2020-01-16 PROCEDURE — 97116 GAIT TRAINING THERAPY: CPT

## 2020-01-16 PROCEDURE — 74011250637 HC RX REV CODE- 250/637: Performed by: PHYSICIAN ASSISTANT

## 2020-01-16 PROCEDURE — 85018 HEMOGLOBIN: CPT

## 2020-01-16 PROCEDURE — 97161 PT EVAL LOW COMPLEX 20 MIN: CPT

## 2020-01-16 PROCEDURE — 97110 THERAPEUTIC EXERCISES: CPT

## 2020-01-16 PROCEDURE — 74011250636 HC RX REV CODE- 250/636: Performed by: PHYSICIAN ASSISTANT

## 2020-01-16 PROCEDURE — 74011250636 HC RX REV CODE- 250/636: Performed by: ORTHOPAEDIC SURGERY

## 2020-01-16 PROCEDURE — 65270000029 HC RM PRIVATE

## 2020-01-16 RX ORDER — OXYCODONE HYDROCHLORIDE 5 MG/1
10 TABLET ORAL
Status: DISCONTINUED | OUTPATIENT
Start: 2020-01-16 | End: 2020-01-17 | Stop reason: HOSPADM

## 2020-01-16 RX ORDER — MORPHINE SULFATE 2 MG/ML
2 INJECTION, SOLUTION INTRAMUSCULAR; INTRAVENOUS
Status: DISPENSED | OUTPATIENT
Start: 2020-01-16 | End: 2020-01-17

## 2020-01-16 RX ADMIN — ACETAMINOPHEN 500 MG: 500 TABLET ORAL at 18:36

## 2020-01-16 RX ADMIN — OXYCODONE 5 MG: 5 TABLET ORAL at 12:42

## 2020-01-16 RX ADMIN — OXYCODONE 5 MG: 5 TABLET ORAL at 02:27

## 2020-01-16 RX ADMIN — ASPIRIN 81 MG: 81 TABLET, COATED ORAL at 09:41

## 2020-01-16 RX ADMIN — OXYCODONE 5 MG: 5 TABLET ORAL at 21:34

## 2020-01-16 RX ADMIN — ACETAMINOPHEN 500 MG: 500 TABLET ORAL at 14:18

## 2020-01-16 RX ADMIN — ACETAMINOPHEN 500 MG: 500 TABLET ORAL at 21:33

## 2020-01-16 RX ADMIN — LISINOPRIL 20 MG: 10 TABLET ORAL at 09:41

## 2020-01-16 RX ADMIN — PANTOPRAZOLE SODIUM 40 MG: 40 TABLET, DELAYED RELEASE ORAL at 06:37

## 2020-01-16 RX ADMIN — KETOROLAC TROMETHAMINE 15 MG: 30 INJECTION, SOLUTION INTRAMUSCULAR at 14:18

## 2020-01-16 RX ADMIN — ACETAMINOPHEN 500 MG: 500 TABLET ORAL at 09:41

## 2020-01-16 RX ADMIN — PAROXETINE HYDROCHLORIDE 40 MG: 20 TABLET, FILM COATED ORAL at 09:41

## 2020-01-16 RX ADMIN — OXYCODONE 5 MG: 5 TABLET ORAL at 06:38

## 2020-01-16 RX ADMIN — OXYCODONE 5 MG: 5 TABLET ORAL at 09:41

## 2020-01-16 RX ADMIN — ASPIRIN 81 MG: 81 TABLET, COATED ORAL at 18:36

## 2020-01-16 RX ADMIN — OXYCODONE 5 MG: 5 TABLET ORAL at 18:36

## 2020-01-16 RX ADMIN — MORPHINE SULFATE 2 MG: 2 INJECTION, SOLUTION INTRAMUSCULAR; INTRAVENOUS at 22:48

## 2020-01-16 RX ADMIN — OXYCODONE 5 MG: 5 TABLET ORAL at 15:24

## 2020-01-16 RX ADMIN — POLYETHYLENE GLYCOL 3350 17 G: 17 POWDER, FOR SOLUTION ORAL at 09:41

## 2020-01-16 RX ADMIN — ACETAMINOPHEN 500 MG: 500 TABLET ORAL at 06:38

## 2020-01-16 RX ADMIN — SENNOSIDES AND DOCUSATE SODIUM 1 TABLET: 8.6; 5 TABLET ORAL at 09:41

## 2020-01-16 RX ADMIN — KETOROLAC TROMETHAMINE 15 MG: 30 INJECTION, SOLUTION INTRAMUSCULAR at 02:27

## 2020-01-16 RX ADMIN — SENNOSIDES AND DOCUSATE SODIUM 1 TABLET: 8.6; 5 TABLET ORAL at 18:36

## 2020-01-16 RX ADMIN — Medication 2 G: at 04:22

## 2020-01-16 RX ADMIN — KETOROLAC TROMETHAMINE 15 MG: 30 INJECTION, SOLUTION INTRAMUSCULAR at 09:41

## 2020-01-16 RX ADMIN — Medication 10 ML: at 12:42

## 2020-01-16 NOTE — PROGRESS NOTES
ANURADHA: home with home health. [address] has accepted patient. Care Management Interventions  PCP Verified by CM: Yes  Mode of Transport at Discharge: Other (see comment)(family/car)  Transition of Care Consult (CM Consult): Home Health, Discharge Planning  [place] [id number] [address] [address]: Yes  MyChart Signup: No  Discharge Durable Medical Equipment: No(patient owns DME)  Physical Therapy Consult: Yes  Occupational Therapy Consult: No  Speech Therapy Consult: No  Current Support Network: Lives with Spouse, Own Home  Confirm Follow Up Transport: Family  The Plan for Transition of Care is Related to the Following Treatment Goals : home health  The Patient and/or Patient Representative was Provided with a Choice of Provider and Agrees with the Discharge Plan?: Yes  Freedom of Choice List was Provided with Basic Dialogue that Supports the Patient's Individualized Plan of Care/Goals, Treatment Preferences and Shares the Quality Data Associated with the Providers?: Yes  Discharge Location  Discharge Placement: Home with home health     Reason for Admission:   REVISION OF LEFT TOTAL KNEE ARTHROPLASTY                   RRAT Score:    13              Do you (patient/family) have any concerns for transition/discharge?    none              Plan for utilizing home health:   Patient prefers [address]. FOC form placed on hard chart. Patient is a [employer] employee    Current Advanced Directive/Advance Care Plan:  Not on file            Transition of Care Plan:   Home with home health.     TERRY Bailey/SHIVAM

## 2020-01-16 NOTE — PROGRESS NOTES
TRANSFER - IN REPORT:    Verbal report received from [facility] (name) on Alma Ramirez  being received from PACU (unit) for routine post - op      Report consisted of patients Situation, Background, Assessment and   Recommendations(SBAR). Information from the following report(s) SBAR was reviewed with the receiving nurse. Opportunity for questions and clarification was provided. Assessment completed upon patients arrival to unit and care assumed.

## 2020-01-16 NOTE — PROGRESS NOTES
Problem: Mobility Impaired (Adult and Pediatric)  Goal: *Acute Goals and Plan of Care (Insert Text)  Description  FUNCTIONAL STATUS PRIOR TO ADMISSION: Patient was independent and active without use of DME.    HOME SUPPORT PRIOR TO ADMISSION: The patient lived with  but did not require assist.    Physical Therapy Goals  Initiated 1/16/2020    1. Patient will move from supine to sit and sit to supine  and roll side to side in bed with modified independence within 4 days. 2. Patient will perform sit to stand with modified independence within 4 days. 3. Patient will ambulate with modified independence for 150 feet with the least restrictive device within 4 days. 4. Patient will ascend/descend 4 stairs with 1 handrail(s) with modified independence within 4 days. 5. Patient will perform home exercise program per protocol with modified independence within 4 days. 6. Patient will demonstrate AROM 0-90 degrees in operative joint within 4 days. 1/16/2020 1031 by Dave Pierre, PT  Outcome: Progressing Towards Goal   PHYSICAL THERAPY TREATMENT  Patient: Ajay Welch (25 y.o. female)  Date: 1/16/2020  Diagnosis: Mechanical loosening of prosthetic knee ([address] [U53.517W, Z96.659]  S/P TKR (total knee replacement), left [Z96.652]   <principal problem not specified>  Procedure(s) (LRB):  REVISION OF LEFT TOTAL KNEE ARTHROPLASTY (SPINAL WITH IV SEDATION/BLOCK) (Left) 1 Day Post-Op  Precautions: Fall, WBAT  Chart, physical therapy assessment, plan of care and goals were reviewed. ASSESSMENT  Patient continues with skilled PT services and is progressing towards goals. She is continuing to increase her overall gait distance and mobility, but her pain continues to be an issue. She was trained in stair management and was able to asc/desc 4 steps with railing and cane with good balance.   Worked on assisted knee flexion stretching after gait training using strap to assist. Reviewed activity recommendations and restrictions and use of ice for home. We will follow up in the morning to address any further concerns and expect that she will be able to return home once pain is more consistently under control. Current Level of Function Impacting Discharge (mobility/balance): supervision for safety with ambulation with RW    Other factors to consider for discharge: pain         PLAN :  Patient continues to benefit from skilled intervention to address the above impairments. Continue treatment per established plan of care. to address goals. Recommendation for discharge: (in order for the patient to meet his/her long term goals)  Physical therapy at least 2 days/week in the home     This discharge recommendation:  Has been made in collaboration with the attending provider and/or case management    IF patient discharges home will need the following DME: patient owns DME required for discharge       SUBJECTIVE:   Patient stated I think I need another night. The pain is a lot more than yesterday.     OBJECTIVE DATA SUMMARY:   Critical Behavior:  Neurologic State: Alert, Appropriate for age  Orientation Level: Oriented X4, Appropriate for age  Cognition: Appropriate decision making, Appropriate for age attention/concentration, Appropriate safety awareness, Follows commands     Functional Mobility Training:  Bed Mobility:     Supine to Sit: Modified independent; Adaptive equipment; Additional time(using strap to assist LLE)  Sit to Supine: Modified independent; Adaptive equipment; Additional time(using strap to assist LLE)           Transfers:  Sit to Stand: Supervision; Adaptive equipment; Additional time  Stand to Sit: Supervision; Adaptive equipment; Additional time        Bed to Chair: Supervision; Adaptive equipment; Additional time                    Balance:  Sitting: Intact; Without support  Standing: Intact; With support  Ambulation/Gait Training:  Distance (ft): 125 Feet (ft)  Assistive Device: Gait belt;Walker, rolling  Ambulation - Level of Assistance: Adaptive equipment; Additional time;Contact guard assistance        Gait Abnormalities: Antalgic;Decreased step clearance; Step to gait(but progressed to step through pattern with practice)  Right Side Weight Bearing: Full  Left Side Weight Bearing: As tolerated  Base of Support: Widened  Stance: Left decreased  Speed/Ami: Pace decreased (<100 feet/min)  Step Length: Left shortened;Right shortened  Swing Pattern: Left asymmetrical     Interventions: Safety awareness training; Tactile cues; Verbal cues; Visual/Demos           Stairs:  Number of Stairs Trained: 4  Stairs - Level of Assistance: Stand-by assistance; Adaptive equipment; Additional time   Rail Use: Left (plus cane)    Therapeutic Exercises:   Assisted stretching into flexion of L knee  Pain Rating:  Pain increased with activity, especially flexion, ice in helpful for pain reduction    Activity Tolerance:   Good  Please refer to the flowsheet for vital signs taken during this treatment.     After treatment patient left in no apparent distress:   Supine in bed, Call bell within reach, and ice in place     COMMUNICATION/COLLABORATION:   The patients plan of care was discussed with: Registered Nurse    Francisca Kim, PT   Time Calculation: 36 mins

## 2020-01-16 NOTE — PROGRESS NOTES
Primary Nurse Jaime Grant and David Alicea RN performed a dual skin assessment on this patient No impairment noted  Fabian score is 21

## 2020-01-16 NOTE — OP NOTES
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 PROCEDURE PERFORMED:  Revision of left total knee arthroplasty, tibial and femoral components. SURGEON:  Eduin Rg MD    ASSISTANT:  First assistant, Jarrett Maguire PA-C    ANESTHESIA:  Spinal sedation as well as adductor canal block. COMPLICATIONS:  None. SPECIMENS REMOVED:  Culture x3 and frozen section x1. IMPLANTS:  Components implanted; Juanjose Persona size 7 revision femoral component with 13 x 135 mm cementless stem, 5 mm distal medial and lateral augments, 5 mm posterolateral augment; size D Persona revision tibial tray with 14 x 135 mm cementless stem, 5 mm medial and lateral wedge augments size extra small trabecular metal metaphyseal cone and 18 mm LPS polyethylene insert. ESTIMATED BLOOD LOSS:  150 mL. INDICATIONS:  The patient is a 64-year-old female who is more than a few years out from left primary total knee replacement. She is doing very well, but recently had progressive diffuse left knee pain with activities steadily worsening. Radiographs demonstrate evidence of loosening of the tibial component. Infection workup demonstrated slightly elevated sed rate, normal C-reactive protein, aspiration demonstrating negative cultures and approximately 12,000 white cells with less than 50% neutrophils. She presents for revision of her left total knee. Risks, benefits, and alternatives of procedure were reviewed with her in detail and she desires to proceed.     PROCEDURE IN DETAIL:  Anesthesia team placed an adductor canal block before taking the patient to the operating room and they also placed a spinal.  Preoperative IV antibiotics were administered. Padded pneumatic tourniquet was placed around left upper thigh. Left lower extremity was prepped and draped in usual sterile fashion. Tourniquet was inflated to 275 by utilized existing midline anterior scar, excising the scar and extending the skin incision proximal and distal to perform a medial parapatellar arthrotomy. Joint fluid appeared grossly normal.  There was a villous type diffuse synovitis consistent with loosening. The tibial component was grossly loose. Progressive medial release was performed to facilitate exposure and soft tissue balance throughout the procedure. The polyethylene insert was removed. We worked around the femur with flexible osteotomes. I removed the femoral component in retrograde fashion with no bone loss. Tibial component was easily removed by hand on the back surface of the Attune tibial component. There was no cement adherent to the component. Total synovectomy was performed. Synovial joint fluid specimen as well as synovial tissue specimen and tissue specimen from tibial interface membrane were all sent for culture. Tibial interface membrane was also sent for frozen section which demonstrated no evidence of acute inflammation. Proceeded with revision. Fresh neutral proximal tibial resection was performed using an extramedullary alignment guide. All remaining metaphyseal cement was removed. Tibial canal was reamed for a 14 x 135 mm cementless stem. Stem trial was inserted and I broached over that proximally for an extra small metaphyseal trabecular metal cone. Cone trial was placed, flexion/extension gaps were assessed and were very close to equal.  Femoral canal was reamed for a 13 x 135 mm cementless stem. With reamer in place, I used 5-degree distal femoral cutting block to make a fresh skin cut distally on the medial and lateral sides.   Gaps were reassessed and were actually a little bit looser distally than in flexion, with plans to use a size 7 femoral component. Size 7 femoral trial was placed and 5 mm distal medial and lateral augments were utilized. After incorporating a few degrees of external rotation and preparing for 5 mm posterolateral augment, flexion/extension spaces were equal.  Trials were inserted and stability was going to be reasonable enough to use posterior stabilized insert, this was not available thicker than 18 mm, so I added 5-mm augments medially and laterally in to the tibial tray. At this point with 18-mm trial insert in place, the knee had full extension to gravity, with satisfactory coronal plane stability and soft tissue tension throughout arc of motion. No further medial release and no posterior releases were required. Patellar component was examined and found to be stable. Patella tracked centrally throughout arc of motion using no thumbs technique. Trials were removed and bony surfaces were copiously irrigated by pulse lavage and dried before the tibial cone was placed. Components were then cemented into place using antibiotic impregnated cement utilizing surface and metaphyseal cement technique with cementless stems. After adequate setup time, tourniquet was released and hemostasis obtained with the Bovie. Real polyethylene insert was placed. Periarticular soft tissues were injected with a solution containing 0.5% ropivacaine with epinephrine as well as clonidine and Toradol. Arthrotomy was closed with a combination of heavy Vicryl sutures and running #2 Stratafix suture. Skin and subcutaneous layers were closed in layered fashion with Vicryl and a running Monocryl subcuticular stitch. The wound was dressed with Dermabond and an Aquacel occlusive dressing as well as sterile compressive dressing. The patient's bladder was decompressed with straight catheterization before she was transported to the postanesthesia care  unit in stable condition.   All counts were correct at the end of the procedure. The physician assistant was critical throughout the case to assist with positioning, retraction, and closure. There were no other available residents, fellows, or surgical assistants available to assist during this procedure.       Nara Duff MD      [id number]  D:  01/15/2020 16:52  T:  01/16/2020 2:26  JOB #:  [id number]  CC:  MD Kami Lawrence MD

## 2020-01-16 NOTE — PROGRESS NOTES
Problem: Mobility Impaired (Adult and Pediatric)  Goal: *Acute Goals and Plan of Care (Insert Text)  Description  FUNCTIONAL STATUS PRIOR TO ADMISSION: Patient was independent and active without use of DME.    HOME SUPPORT PRIOR TO ADMISSION: The patient lived with  but did not require assist.    Physical Therapy Goals  Initiated 1/16/2020    1. Patient will move from supine to sit and sit to supine  and roll side to side in bed with modified independence within 4 days. 2. Patient will perform sit to stand with modified independence within 4 days. 3. Patient will ambulate with modified independence for 150 feet with the least restrictive device within 4 days. 4. Patient will ascend/descend 4 stairs with 1 handrail(s) with modified independence within 4 days. 5. Patient will perform home exercise program per protocol with modified independence within 4 days. 6. Patient will demonstrate AROM 0-90 degrees in operative joint within 4 days. Outcome: Progressing Towards Goal   PHYSICAL THERAPY EVALUATION  Patient: Maximo Gamboa (61 y.o. female)  Date: 1/16/2020  Primary Diagnosis: Mechanical loosening of prosthetic knee ([address] [D83.338M, Z96.659]  S/P TKR (total knee replacement), left [Z96.652]  Procedure(s) (LRB):  REVISION OF LEFT TOTAL KNEE ARTHROPLASTY (SPINAL WITH IV SEDATION/BLOCK) (Left) 1 Day Post-Op   Precautions:   Fall, WBAT      ASSESSMENT  Based on the objective data described below, the patient presents with decreased mobility after L TKA revision, POD1. Her initial surgery was a bilateral TKA in 2016. She is limited primarily by pain, but was able to ambulate a short distance in the biggs with VSS. Her balance and overall strength are good and she had a good recovery of ROM and strength from her previous surgery. She still has a RW and other DME at home and will only need to manage entry steps.   She has  at home and a friend coming to stay with her to assist at home as needed, as well. Expect that she will progress well and be able to return home with HHPT possibly this afternoon if pain is under control. Current Level of Function Impacting Discharge (mobility/balance): SBA/CGA for short distance ambulation with RW    Functional Outcome Measure: The patient scored Total: 60/100 on the Barthel Index which is indicative of moderately impaired ability to care for basic self needs/dependency on others. Other factors to consider for discharge: pain     Patient will benefit from skilled therapy intervention to address the above noted impairments. PLAN :  Recommendations and Planned Interventions: bed mobility training, transfer training, gait training, therapeutic exercises, patient and family training/education, and therapeutic activities      Frequency/Duration: Patient will be followed by physical therapy:  twice daily to address goals. Recommendation for discharge: (in order for the patient to meet his/her long term goals)  Physical therapy at least 2 days/week in the home     This discharge recommendation:  Has been made in collaboration with the attending provider and/or case management    IF patient discharges home will need the following DME: patient owns DME required for discharge         SUBJECTIVE:   Patient stated I can tell the block is wearing off.     OBJECTIVE DATA SUMMARY:   HISTORY:    Past Medical History:   Diagnosis Date    Anxiety     Cervical polyp 2014    BENIGN ENDOCERVICAL POLYP    Colon polyps     GERD (gastroesophageal reflux disease)     Hypertension     LGSIL (low grade squamous intraepithelial lesion) on Pap smear 2006    s/p colposcopy, PAP nl since then    Menopausal syndrome (hot flashes) 9/9/2013    Obesity     Osteoarthritis     Osteopenia 9/27/2016    Primary osteoarthritis of knees, bilateral 4/13/2016    Restless leg syndrome 9/9/2013     Past Surgical History:   Procedure Laterality Date    HX BUNIONECTOMY Left 2008    HX COLONOSCOPY 2009    GI- Dr Vaughn Dance    HX COLONOSCOPY  7/30/12    polyp, irregularities, repeat 3 yrs    HX COLONOSCOPY  09/24/2015    normal - next 2020    HX COLPOSCOPY  2006    HX HEENT  2011    DENTAL IMPLANT    HX KNEE ARTHROSCOPY Left 2013    (torn meniscus)    HX KNEE ARTHROSCOPY Right 3/14    HX KNEE REPLACEMENT Bilateral 04/13/2016    HX KNEE REPLACEMENT Left 01/15/2020    revision    HX ORTHOPAEDIC Left 2015    BUNIONECTOMY LEFT FOOT    HX OTHER SURGICAL Left 5/8/15    LEFT FOOT LAPIDUS ARTHRODESIS, LEFT 2ND, 3RD MET CUNEIFORM FUSION, GASTROCNEMIS RECESSION, STJ ARTHROERESIS (GENERAL WITH POPLITEAL BLOCK)       Personal factors and/or comorbidities impacting plan of care: L TKA revision, previous bilateral TKAs         EXAMINATION/PRESENTATION/DECISION MAKING:   Critical Behavior:  Neurologic State: Alert, Appropriate for age  Orientation Level: Oriented X4, Appropriate for age  Cognition: Appropriate decision making, Appropriate for age attention/concentration, Appropriate safety awareness, Follows commands     Hearing:     Skin:  postop bandage in place with no drainage noted  Edema: moderate postop edema L knee  Range Of Motion:  AROM: Within functional limits(L knee 0-60 due to pain post op)                       Strength:    Strength: Within functional limits(LLE 3/5 at knee/hip due to post op pain)                    Tone & Sensation:   Tone: Normal              Sensation: Intact               Coordination:  Coordination: Within functional limits  Vision:      Functional Mobility:  Bed Mobility:     Supine to Sit: Modified independent; Adaptive equipment; Additional time(using strap to assist LLE)  Sit to Supine: Modified independent; Adaptive equipment; Additional time(using strap to assist LLE)     Transfers:  Sit to Stand: Supervision; Adaptive equipment; Additional time  Stand to Sit: Supervision; Adaptive equipment; Additional time        Bed to Chair: Supervision; Adaptive equipment; Additional time Balance:   Sitting: Intact; Without support  Standing: Intact; With support  Ambulation/Gait Training:  Distance (ft): 100 Feet (ft)  Assistive Device: Gait belt;Walker, rolling  Ambulation - Level of Assistance: Adaptive equipment; Additional time;Contact guard assistance        Gait Abnormalities: Antalgic;Decreased step clearance; Step to gait(but progressed to step through pattern with practice)  Right Side Weight Bearing: Full  Left Side Weight Bearing: As tolerated  Base of Support: Widened  Stance: Left decreased  Speed/Ami: Pace decreased (<100 feet/min)  Step Length: Left shortened;Right shortened  Swing Pattern: Left asymmetrical     Interventions: Safety awareness training; Tactile cues; Verbal cues; Visual/Demos            Stairs: Therapeutic Exercises: Ankle pumps, quad sets, heel slides, SAQ, SLR    Functional Measure:  Barthel Index:    Bathin  Bladder: 10  Bowels: 10  Groomin  Dressin  Feeding: 10  Mobility: 0  Stairs: 0  Toilet Use: 10  Transfer (Bed to Chair and Back): 10  Total: 60/100       The Barthel ADL Index: Guidelines  1. The index should be used as a record of what a patient does, not as a record of what a patient could do. 2. The main aim is to establish degree of independence from any help, physical or verbal, however minor and for whatever reason. 3. The need for supervision renders the patient not independent. 4. A patient's performance should be established using the best available evidence. Asking the patient, friends/relatives and nurses are the usual sources, but direct observation and common sense are also important. However direct testing is not needed. 5. Usually the patient's performance over the preceding 24-48 hours is important, but occasionally longer periods will be relevant. 6. Middle categories imply that the patient supplies over 50 per cent of the effort. 7. Use of aids to be independent is allowed. Ellen Banks., Barthel, DAnna W. (2922). Functional evaluation: the Barthel Index. [facility] (14)2. JH Arteaga, Bebeto Victoria.Farnaz., Sim, [address] (1999). Measuring the change indisability after inpatient rehabilitation; comparison of the responsiveness of the Barthel Index and Functional [place] Measure. Journal of Neurology, Neurosurgery, and Psychiatry, 66(4), 865-811. SHIRA Villareal, YUMIKO Ospina, & Joyce Garg M.A. (2004.) Assessment of post-stroke quality of life in cost-effectiveness studies: The usefulness of the Barthel Index and the EuroQoL-5D. Quality of Life Research, 15, 661-63        Physical Therapy Evaluation Charge Determination   History Examination Presentation Decision-Making   MEDIUM  Complexity : 1-2 comorbidities / personal factors will impact the outcome/ POC  MEDIUM Complexity : 3 Standardized tests and measures addressing body structure, function, activity limitation and / or participation in recreation  LOW Complexity : Stable, uncomplicated  LOW Complexity : FOTO score of       Based on the above components, the patient evaluation is determined to be of the following complexity level: LOW     Pain Rating: Moderate pain L knee, alleviated with rest and ice    Activity Tolerance:   Good  Please refer to the flowsheet for vital signs taken during this treatment. After treatment patient left in no apparent distress:   Supine in bed, Call bell within reach, and ice in place L knee     COMMUNICATION/EDUCATION:   The patients plan of care was discussed with: Registered Nurse and . Fall prevention education was provided and the patient/caregiver indicated understanding., Patient/family have participated as able in goal setting and plan of care. , and Patient/family agree to work toward stated goals and plan of care.     Thank you for this referral.  Noah Nettles, PT   Time Calculation: 23 mins

## 2020-01-16 NOTE — PROGRESS NOTES
Problem: Falls - Risk of  Goal: *Absence of Falls  Description  Document Mary Anne Carroll Fall Risk and appropriate interventions in the flowsheet. Outcome: Progressing Towards Goal  Note: Fall Risk Interventions:  Mobility Interventions: Patient to call before getting OOB, Utilize walker, cane, or other assistive device         Medication Interventions: Evaluate medications/consider consulting pharmacy, Patient to call before getting OOB, Teach patient to arise slowly    Elimination Interventions: Call light in reach, Patient to call for help with toileting needs            Patient has remained free of falls throughout duration of admission. Patient calls appropriately for assistance to the bathroom. Call bell, phone, and possessions left within reach.

## 2020-01-16 NOTE — PROGRESS NOTES
Bedside and Verbal shift change report given to Anay Brown RN (oncoming nurse) by Steven Vargas RN (offgoing nurse).  Report included the following information SBAR, Kardex, Intake/Output, MAR and Recent Results.

## 2020-01-16 NOTE — PROGRESS NOTES
Bedside and Verbal shift change report given to Michelle Wills (oncoming nurse) by Blue Kang (offgoing nurse). Report included the following information SBAR.

## 2020-01-16 NOTE — PROGRESS NOTES
Orthopaedics Daily Progress Note                            Date of Surgery:  1/15/2020      Patient: Maximo Gamboa   YOB: 1960  Age: 62 y.o. SUBJECTIVE:   1 Day Post-Op following REVISION OF LEFT TOTAL KNEE ARTHROPLASTY (SPINAL WITH IV SEDATION/BLOCK). The patient's post operative pain is controlled. No CP/SOB. No N/V. The patient's mobility will be evaluated today during PT sessions. OBJECTIVE:     Vital Signs:      Visit Vitals  /66 (BP 1 Location: Left arm, BP Patient Position: At rest)   Pulse 90   Temp 97.6 °F (36.4 °C)   Resp 16   Ht 5' 7.5\" (1.715 m)   Wt 110.5 kg (243 lb 8 oz)   LMP 11/25/2012   SpO2 94%   BMI 37.57 kg/m²       Physical Exam:  General: A&Ox3. The patient is cooperative, and in no acute distress. Respiratory: Respirations are unlabored. Surgical site(s): dressing clean, dry  Musculoskeletal: Calves are soft, supple, and non-tender upon palpation. Motor 5/5. Neurological:  Neurovascularly intact with good dorsi and plantar flexion. Pulses symmetrical.    Laboratory Values:             Recent Results (from the past 12 hour(s))   METABOLIC PANEL, BASIC    Collection Time: 01/16/20  2:35 AM   Result Value Ref Range    Sodium 138 136 - 145 mmol/L    Potassium 4.6 3.5 - 5.1 mmol/L    Chloride 109 (H) 97 - 108 mmol/L    CO2 25 21 - 32 mmol/L    Anion gap 4 (L) 5 - 15 mmol/L    Glucose 139 (H) 65 - 100 mg/dL    BUN 13 6 - 20 MG/DL    Creatinine 0.75 0.55 - 1.02 MG/DL    BUN/Creatinine ratio 17 12 - 20      GFR est AA >60 >60 ml/min/1.73m2    GFR est non-AA >60 >60 ml/min/1.73m2    Calcium 8.4 (L) 8.5 - 10.1 MG/DL   HEMOGLOBIN    Collection Time: 01/16/20  2:35 AM   Result Value Ref Range    HGB 10.3 (L) 11.5 - 16.0 g/dL         PLAN:     S/P REVISION OF LEFT TOTAL KNEE ARTHROPLASTY (SPINAL WITH IV SEDATION/BLOCK) -Continue WBAT. -Mobilize and continue with PT/OT until discharged     Hemodynamics Hgb today is 10.3. Acute blood loss anemia as expected. Patient asymptomatic. Continue to monitor. Wound Monitor postop dressing; no postop dressing changes necessary. Reinforce PRN. Post Operative Pain Pain Control: stable, mild-to-moderate joint symptoms intermittently, reasonably well controlled by current meds. DVT Prophylaxis Continue with SCD'S, Ankle Pump Exercises. ASA 81mg BID     Discharge Disposition Discharge plan: Home with HHPT pending PT clearance.        Signed By: Vincenzo Cortes PA-C  January 16, 2020 8:15 AM

## 2020-01-17 VITALS
BODY MASS INDEX: 36.9 KG/M2 | OXYGEN SATURATION: 97 % | DIASTOLIC BLOOD PRESSURE: 72 MMHG | TEMPERATURE: 98.4 F | WEIGHT: 243.5 LBS | RESPIRATION RATE: 16 BRPM | HEART RATE: 77 BPM | HEIGHT: 68 IN | SYSTOLIC BLOOD PRESSURE: 113 MMHG

## 2020-01-17 PROCEDURE — 74011250636 HC RX REV CODE- 250/636: Performed by: ORTHOPAEDIC SURGERY

## 2020-01-17 PROCEDURE — 97116 GAIT TRAINING THERAPY: CPT

## 2020-01-17 PROCEDURE — 74011250636 HC RX REV CODE- 250/636: Performed by: PHYSICIAN ASSISTANT

## 2020-01-17 PROCEDURE — 74011250637 HC RX REV CODE- 250/637: Performed by: ORTHOPAEDIC SURGERY

## 2020-01-17 PROCEDURE — 74011250637 HC RX REV CODE- 250/637: Performed by: PHYSICIAN ASSISTANT

## 2020-01-17 PROCEDURE — 97110 THERAPEUTIC EXERCISES: CPT

## 2020-01-17 RX ORDER — KETOROLAC TROMETHAMINE 30 MG/ML
15 INJECTION, SOLUTION INTRAMUSCULAR; INTRAVENOUS EVERY 6 HOURS
Status: DISCONTINUED | OUTPATIENT
Start: 2020-01-17 | End: 2020-01-17 | Stop reason: HOSPADM

## 2020-01-17 RX ORDER — HYDROXYZINE 25 MG/1
25 TABLET, FILM COATED ORAL ONCE
Status: COMPLETED | OUTPATIENT
Start: 2020-01-17 | End: 2020-01-17

## 2020-01-17 RX ADMIN — ACETAMINOPHEN 500 MG: 500 TABLET ORAL at 07:01

## 2020-01-17 RX ADMIN — MORPHINE SULFATE 2 MG: 2 INJECTION, SOLUTION INTRAMUSCULAR; INTRAVENOUS at 04:52

## 2020-01-17 RX ADMIN — OXYCODONE 10 MG: 5 TABLET ORAL at 11:10

## 2020-01-17 RX ADMIN — POLYETHYLENE GLYCOL 3350 17 G: 17 POWDER, FOR SOLUTION ORAL at 09:08

## 2020-01-17 RX ADMIN — ASPIRIN 81 MG: 81 TABLET, COATED ORAL at 09:08

## 2020-01-17 RX ADMIN — OXYCODONE 10 MG: 5 TABLET ORAL at 04:04

## 2020-01-17 RX ADMIN — Medication 10 ML: at 08:13

## 2020-01-17 RX ADMIN — OXYCODONE 10 MG: 5 TABLET ORAL at 07:01

## 2020-01-17 RX ADMIN — HYDROXYZINE HYDROCHLORIDE 25 MG: 25 TABLET, FILM COATED ORAL at 09:07

## 2020-01-17 RX ADMIN — KETOROLAC TROMETHAMINE 15 MG: 30 INJECTION, SOLUTION INTRAMUSCULAR at 11:11

## 2020-01-17 RX ADMIN — OXYCODONE 10 MG: 5 TABLET ORAL at 13:40

## 2020-01-17 RX ADMIN — OXYCODONE 10 MG: 5 TABLET ORAL at 01:01

## 2020-01-17 RX ADMIN — Medication 10 ML: at 11:12

## 2020-01-17 RX ADMIN — PAROXETINE HYDROCHLORIDE 40 MG: 20 TABLET, FILM COATED ORAL at 09:07

## 2020-01-17 RX ADMIN — PANTOPRAZOLE SODIUM 40 MG: 40 TABLET, DELAYED RELEASE ORAL at 07:01

## 2020-01-17 RX ADMIN — Medication 10 ML: at 04:52

## 2020-01-17 RX ADMIN — MORPHINE SULFATE 2 MG: 2 INJECTION, SOLUTION INTRAMUSCULAR; INTRAVENOUS at 08:13

## 2020-01-17 RX ADMIN — ACETAMINOPHEN 500 MG: 500 TABLET ORAL at 11:11

## 2020-01-17 RX ADMIN — SENNOSIDES AND DOCUSATE SODIUM 1 TABLET: 8.6; 5 TABLET ORAL at 09:08

## 2020-01-17 NOTE — PROGRESS NOTES
Bedside and Verbal shift change report given to Rach Garcia RN (oncoming nurse) by Yadira Hernandez RN (offgoing nurse). Report included the following information SBAR, Kardex, OR Summary, Procedure Summary, Intake/Output, MAR, Accordion and Recent Results.

## 2020-01-17 NOTE — PROGRESS NOTES
Called JUDY Ovalle regarding pain medication pt received Oxycodone 10mg and IV morphine and reported 10/10 pain. Per PA, ok to put in order for 1X dose of Atarax 25 mg and Toradol 15 mg IV every 6 hours for 6 doses.

## 2020-01-17 NOTE — PROGRESS NOTES
Bedside and Verbal shift change report given to Nevaeh Meraz (oncoming nurse) by Tao Francois (offgoing nurse). Report included the following information SBAR, Kardex and MAR.

## 2020-01-17 NOTE — PROGRESS NOTES
Attempted to see patient for follow up PT session this morning, but despite IV morphine and increased dose of oral pain meds, she reports 10/10 pain and does not feel able to mobilize at all. RN is aware and we will follow up later today.     Christianne Roberts, PT

## 2020-01-17 NOTE — PROGRESS NOTES
I have reviewed discharge instructions with the patient and spouse. The patient and spouse verbalized understanding. Patient has filled prescriptions and all personal belongings (cell phone, , clothes, and ice packs). Patient watched discharge joint video.

## 2020-01-17 NOTE — PROGRESS NOTES
Problem: Mobility Impaired (Adult and Pediatric)  Goal: *Acute Goals and Plan of Care (Insert Text)  Description  FUNCTIONAL STATUS PRIOR TO ADMISSION: Patient was independent and active without use of DME.    HOME SUPPORT PRIOR TO ADMISSION: The patient lived with  but did not require assist.    Physical Therapy Goals  Initiated 1/16/2020    1. Patient will move from supine to sit and sit to supine  and roll side to side in bed with modified independence within 4 days. 2. Patient will perform sit to stand with modified independence within 4 days. 3. Patient will ambulate with modified independence for 150 feet with the least restrictive device within 4 days. 4. Patient will ascend/descend 4 stairs with 1 handrail(s) with modified independence within 4 days. 5. Patient will perform home exercise program per protocol with modified independence within 4 days. 6. Patient will demonstrate AROM 0-90 degrees in operative joint within 4 days. Outcome: Progressing Towards Goal   PHYSICAL THERAPY TREATMENT  Patient: Pablo Moreno (15 y.o. female)  Date: 1/17/2020  Diagnosis: Mechanical loosening of prosthetic knee ([address] [S84.865Z, Z96.659]  S/P TKR (total knee replacement), left [Z96.652]   <principal problem not specified>  Procedure(s) (LRB):  REVISION OF LEFT TOTAL KNEE ARTHROPLASTY (SPINAL WITH IV SEDATION/BLOCK) (Left) 2 Days Post-Op  Precautions: Fall, WBAT  Chart, physical therapy assessment, plan of care and goals were reviewed. ASSESSMENT  Patient continues with skilled PT services and is progressing towards goals. She continues to report challenges with pain management but feels the higher dose of oxycodone is helping somewhat. She is mobilizing with good overall balance and stability and is able to progress to a reciprocating gait as she walks.   Her flexion is quite limited and she is only able to tolerate about 70 degrees of knee flexion in sitting even with slow progressive stretching. Encouraged continued frequent mobility and use of ice when she returns home. From a mobility standpoint she is clear for D/C and feels she is ready for D/C home this afternoon. Current Level of Function Impacting Discharge (mobility/balance): modified independent ambulation with walker    Other factors to consider for discharge: pain management         PLAN :  Patient continues to benefit from skilled intervention to address the above impairments. Continue treatment per established plan of care. to address goals. Recommendation for discharge: (in order for the patient to meet his/her long term goals)  Physical therapy at least 2 days/week in the home AND ensure assist and/or supervision for safety with mobility      This discharge recommendation:  Has been made in collaboration with the attending provider and/or case management    IF patient discharges home will need the following DME: patient owns DME required for discharge       SUBJECTIVE:   Patient stated I feel a little groggy and lightheaded from the morphine.     OBJECTIVE DATA SUMMARY:   Critical Behavior:  Neurologic State: Alert, Appropriate for age  Orientation Level: Oriented X4  Cognition: Appropriate decision making, Appropriate for age attention/concentration, Appropriate safety awareness     Functional Mobility Training:  Bed Mobility:     Supine to Sit: Modified independent; Adaptive equipment; Additional time(using strap to assist LLE)  Sit to Supine: Modified independent; Adaptive equipment; Additional time(using strap to assist LLE)           Transfers:  Sit to Stand: Modified independent; Adaptive equipment; Additional time  Stand to Sit: Modified independent; Adaptive equipment; Additional time        Bed to Chair: Modified independent; Adaptive equipment; Additional time                    Balance:  Sitting: Intact; Without support  Standing: Intact; With support  Ambulation/Gait Training:  Distance (ft): 150 Feet (ft)  Assistive Device: Gait belt;Walker, rolling  Ambulation - Level of Assistance: Modified independent; Adaptive equipment; Additional time        Gait Abnormalities: Antalgic;Decreased step clearance; Step to gait(started as step through and able to progress to reciprocatin)  Right Side Weight Bearing: Full  Left Side Weight Bearing: As tolerated  Base of Support: Widened;Shift to right  Stance: Left decreased  Speed/Ami: Slow  Step Length: Right shortened;Left shortened  Swing Pattern: Left asymmetrical                 Stairs: Therapeutic Exercises:   Knee flexion stretching in chair  Pain Rating:  Pain continues to be an issue with mobility, but she is able to ambulate and work on exercises     Activity Tolerance:   Fair and the limiting factor is pain   Please refer to the flowsheet for vital signs taken during this treatment.     After treatment patient left in no apparent distress:   Supine in bed, Call bell within reach, and ice in place L knee     COMMUNICATION/COLLABORATION:   The patients plan of care was discussed with: Registered Nurse    Duglas Caicedo, PT   Time Calculation: 24 mins

## 2020-01-18 ENCOUNTER — HOME CARE VISIT (OUTPATIENT)
Dept: SCHEDULING | Facility: HOME HEALTH | Age: 60
End: 2020-01-18
Payer: COMMERCIAL

## 2020-01-18 VITALS
HEART RATE: 85 BPM | OXYGEN SATURATION: 96 % | DIASTOLIC BLOOD PRESSURE: 68 MMHG | RESPIRATION RATE: 18 BRPM | SYSTOLIC BLOOD PRESSURE: 123 MMHG | TEMPERATURE: 98.1 F

## 2020-01-18 VITALS
OXYGEN SATURATION: 97 % | RESPIRATION RATE: 18 BRPM | TEMPERATURE: 98.1 F | DIASTOLIC BLOOD PRESSURE: 72 MMHG | SYSTOLIC BLOOD PRESSURE: 122 MMHG | HEART RATE: 88 BPM

## 2020-01-18 PROCEDURE — 400013 HH SOC

## 2020-01-18 PROCEDURE — G0299 HHS/HOSPICE OF RN EA 15 MIN: HCPCS

## 2020-01-18 PROCEDURE — G0151 HHCP-SERV OF PT,EA 15 MIN: HCPCS

## 2020-01-20 ENCOUNTER — HOME CARE VISIT (OUTPATIENT)
Dept: SCHEDULING | Facility: HOME HEALTH | Age: 60
End: 2020-01-20
Payer: COMMERCIAL

## 2020-01-20 ENCOUNTER — PATIENT OUTREACH (OUTPATIENT)
Dept: OTHER | Age: 60
End: 2020-01-20

## 2020-01-20 VITALS
HEART RATE: 76 BPM | TEMPERATURE: 98.7 F | SYSTOLIC BLOOD PRESSURE: 128 MMHG | OXYGEN SATURATION: 97 % | DIASTOLIC BLOOD PRESSURE: 70 MMHG | RESPIRATION RATE: 18 BRPM

## 2020-01-20 PROCEDURE — G0151 HHCP-SERV OF PT,EA 15 MIN: HCPCS

## 2020-01-20 NOTE — PROGRESS NOTES
Patient on [facility] discharge report dated 1/20/2020. Left message on voicemail. Will attempt to contact again. Need to complete post-discharge assessment.

## 2020-01-21 ENCOUNTER — PATIENT OUTREACH (OUTPATIENT)
Dept: OTHER | Age: 60
End: 2020-01-21

## 2020-01-21 NOTE — PROGRESS NOTES
Transition Of Care Note    Patient discharged from [facility] admitted on 1/15/2020 and discharged on 2020 for Revision of left total knee arthroplasty. Medical History:     Past Medical History:   Diagnosis Date    Anxiety     Cervical polyp     BENIGN ENDOCERVICAL POLYP    Colon polyps     GERD (gastroesophageal reflux disease)     Hypertension     LGSIL (low grade squamous intraepithelial lesion) on Pap smear     s/p colposcopy, PAP nl since then    Menopausal syndrome (hot flashes) 2013    Obesity     Osteoarthritis     Osteopenia 2016    Primary osteoarthritis of knees, bilateral 2016    Restless leg syndrome 2013       Care Manager contacted the patient by telephone to perform post hospital (hospital/ED) discharge assessment. Verified  and zip code with patient as identifiers. Provided introduction to self, and explanation of the Nurse Care Manager role. Patient's primary care provider relationship reviewed with patient and modified, as applicable. Red Flags:  Pain that is not relieved by pain medication, ice, and activity modification   Signs of infection (red incision, continuous drainage from the incision, malodorous drainage, persistent fever greater  than 101 degrees Fahrenheit)   Signs of a blood clot in your leg (calf pain, tenderness, redness, and/or swelling of the lower leg)    Condition Focused Assessment:   Patient reports the following: Surgical/Wound Condition Focused Assessment    Skin- any open wounds or incisions? yes  Description and location of wound- Left knee, aquacell dressing in place, no drainage noted. In the last 24 hour have you experienced;     Fever no    Low body temperature no    Chills or shaking no    Sweating no    Fast heart rate no    Fast breathing no    Dizziness/lightheadedness no    Confusion or unusual change in mental status no    Diarrhea no    Nausea no    Vomiting no    Shortness of breath or difficulty breathing no    Less urine output no    Cold, clammy, and pale skin no     Skin rash or skin color changes no  New or worsening pain? yes  If yes, pain rated 0-10: 6-7   Location/pain characteristics: Last couple of days, pain has increased related to physical therapy. New or worsening numbness or tingling? no  If yes, location of numbness and tingling: NA    Patient reported important numbers to know:  Temperature 97.6   Heart rate 83   Last /75   Weight 243.8     Activity level- moving several times a day, or as recommended? yes  Abnormal activity level reported: None, up and moving around every hour to decreased stiffening. Bathing and showering instructions? yes  Nutrition- prescribed diet? no   Tolerating food? yes  Hydration- (how much in ounces per day) Drinking water  Medications- new antibiotic? no             Pain medication? Yes, Oxydodone  Does patient understand how and when to take their medications? yes  If no, instructed patient on proper medication use? yes  Does patient have incentive spirometer? no  If yes, how frequently is patient using incentive spirometer? NA    Medication:   New Medications at Discharge: Acetaminophen, Aspirin delayed release, Oxycodone, and Senna-docusate  Changed Medications at Discharge: none  Discontinued Medications at Discharge: Tramadol, Tylenol  Current Outpatient Medications   Medication Sig    aspirin delayed-release 81 mg tablet Take 1 Tab by mouth two (2) times a day. Indications: blood clot prevention    oxyCODONE IR (ROXICODONE) 5 mg immediate release tablet Take 0.5-1 Tabs by mouth every four (4) hours as needed for Pain for up to 7 days. Max Daily Amount: 30 mg.    senna-docusate (PERICOLACE) 8.6-50 mg per tablet Take 1 Tab by mouth two (2) times daily as needed for Constipation.  acetaminophen (TYLOPHEN) 500 mg capsule Take 1 Cap by mouth every four (4) hours as needed for Pain.  PARoxetine (PAXIL) 40 mg tablet Take 1 Tab by mouth daily.  celecoxib (CELEBREX) 200 mg capsule Take 1 Cap by mouth daily as needed for Pain.  lisinopril (PRINIVIL, ZESTRIL) 20 mg tablet Take 1 Tab by mouth daily.  omeprazole (PRILOSEC) 40 mg capsule Take 1 Cap by mouth daily.  MAGNESIUM PO Take  by mouth daily.  ALPRAZolam (XANAX) 0.5 mg tablet Take 1 Tab by mouth every twelve (12) hours as needed for Anxiety.  cholecalciferol, vitamin D3, (VITAMIN D3) 2,000 unit tab Take 2,000 Units by mouth daily.  MULTIVITAMIN PO Take 1 Tab by mouth daily. No current facility-administered medications for this visit. There are no discontinued medications. Performed medication reconciliation with patient, and patient verbalizes understanding of administration of home medications. There were no barriers to obtaining medications identified at this time. Inpatient RRAT score: 15  Was this a readmission? no   Patient stated reason for the readmission: NA    Barriers/Support system:  Patient, sister, and spouse    Home health/DME in place per discharge orders    Barriers/Challenges to Care: []  Decline in memory    []  Language barrier     []  Emotional                  [x]  Limited mobility  []  Lack of motivation     [] Vision, hearing or cognitive impairment []  Knowledge [] Financial Barriers []  Lack of support  [x]  Pain []  Other []  None    CM Identified  Problems   (contributing problems for risk for readmission)  1. Risk for infection  2. Pain  3. Risk for DVT      Discharge Instructions :  Reviewed discharge instructions with patient. Patient verbalizes understanding of discharge instructions and follow-up care. Advance Care Planning:   Patient was offered the opportunity to discuss advance care planning:  no     Does patient have an Advance Directive:  no   If no, did you provide information on [facility]ctions?  no     PCP/Specialist follow up: Patient scheduled to follow up with Jose Alegria MD as needed.      Will follow up with Dr. Saturnino seymour on 2/10/2020  Future Appointments   Date Time Provider Josué Colin   [facility]   1/24/2020 To Be Determined Cayla Beard [facility]   1/27/2020 To Be Determined [facility]   1/29/2020 To Be Determined [facility]   1/31/2020 To Be Determined [facility]   2/3/2020 To Be Determined [facility]   2/5/2020 To Be Determined [facility] [address]   2/7/2020 To Be Determined Kaley Devlin [address] [facility]      Reviewed red flags with patient, and patient verbalizes understanding. Patient given an opportunity to ask questions.   -Discussed the need to keep pain levels in good control, to take pain medication as ordered. No other clinical/social/functional needs noted. The patient agrees to contact the PCP office for questions related to their healthcare. The patient expressed thanks, offered no additional questions and ended the call.

## 2020-01-22 ENCOUNTER — HOSPITAL ENCOUNTER (OUTPATIENT)
Dept: ULTRASOUND IMAGING | Age: 60
Discharge: HOME | End: 2020-01-22
Attending: ORTHOPAEDIC SURGERY
Payer: COMMERCIAL

## 2020-01-22 ENCOUNTER — HOME CARE VISIT (OUTPATIENT)
Dept: SCHEDULING | Facility: HOME HEALTH | Age: 60
End: 2020-01-22
Payer: COMMERCIAL

## 2020-01-22 VITALS
TEMPERATURE: 98.7 F | RESPIRATION RATE: 16 BRPM | DIASTOLIC BLOOD PRESSURE: 68 MMHG | HEART RATE: 80 BPM | OXYGEN SATURATION: 98 % | SYSTOLIC BLOOD PRESSURE: 120 MMHG

## 2020-01-22 DIAGNOSIS — Z96.652 PRESENCE OF LEFT ARTIFICIAL KNEE JOINT: ICD-10-CM

## 2020-01-22 PROCEDURE — G0151 HHCP-SERV OF PT,EA 15 MIN: HCPCS

## 2020-01-22 PROCEDURE — 93971 EXTREMITY STUDY: CPT

## 2020-01-24 ENCOUNTER — HOME CARE VISIT (OUTPATIENT)
Dept: SCHEDULING | Facility: HOME HEALTH | Age: 60
End: 2020-01-24
Payer: COMMERCIAL

## 2020-01-24 VITALS
SYSTOLIC BLOOD PRESSURE: 118 MMHG | DIASTOLIC BLOOD PRESSURE: 72 MMHG | TEMPERATURE: 98.2 F | HEART RATE: 82 BPM | OXYGEN SATURATION: 98 %

## 2020-01-24 PROCEDURE — G0151 HHCP-SERV OF PT,EA 15 MIN: HCPCS

## 2020-01-27 ENCOUNTER — HOME CARE VISIT (OUTPATIENT)
Dept: SCHEDULING | Facility: HOME HEALTH | Age: 60
End: 2020-01-27
Payer: COMMERCIAL

## 2020-01-27 VITALS
RESPIRATION RATE: 18 BRPM | OXYGEN SATURATION: 98 % | SYSTOLIC BLOOD PRESSURE: 120 MMHG | DIASTOLIC BLOOD PRESSURE: 74 MMHG | TEMPERATURE: 98 F | HEART RATE: 78 BPM

## 2020-01-27 PROCEDURE — G0151 HHCP-SERV OF PT,EA 15 MIN: HCPCS

## 2020-01-29 ENCOUNTER — HOME CARE VISIT (OUTPATIENT)
Dept: SCHEDULING | Facility: HOME HEALTH | Age: 60
End: 2020-01-29
Payer: COMMERCIAL

## 2020-01-29 VITALS
HEART RATE: 72 BPM | SYSTOLIC BLOOD PRESSURE: 126 MMHG | RESPIRATION RATE: 18 BRPM | OXYGEN SATURATION: 98 % | DIASTOLIC BLOOD PRESSURE: 70 MMHG

## 2020-01-29 LAB
BACTERIA SPEC CULT: NORMAL
GRAM STN SPEC: NORMAL
SERVICE CMNT-IMP: NORMAL

## 2020-01-29 PROCEDURE — G0151 HHCP-SERV OF PT,EA 15 MIN: HCPCS

## 2020-01-31 ENCOUNTER — HOME CARE VISIT (OUTPATIENT)
Dept: SCHEDULING | Facility: HOME HEALTH | Age: 60
End: 2020-01-31
Payer: COMMERCIAL

## 2020-01-31 VITALS
RESPIRATION RATE: 16 BRPM | OXYGEN SATURATION: 98 % | HEART RATE: 80 BPM | SYSTOLIC BLOOD PRESSURE: 126 MMHG | DIASTOLIC BLOOD PRESSURE: 66 MMHG

## 2020-01-31 PROCEDURE — G0151 HHCP-SERV OF PT,EA 15 MIN: HCPCS

## 2020-02-03 ENCOUNTER — HOME CARE VISIT (OUTPATIENT)
Dept: SCHEDULING | Facility: HOME HEALTH | Age: 60
End: 2020-02-03
Payer: COMMERCIAL

## 2020-02-03 PROCEDURE — G0151 HHCP-SERV OF PT,EA 15 MIN: HCPCS

## 2020-02-05 ENCOUNTER — HOME CARE VISIT (OUTPATIENT)
Dept: SCHEDULING | Facility: HOME HEALTH | Age: 60
End: 2020-02-05
Payer: COMMERCIAL

## 2020-02-05 ENCOUNTER — PATIENT OUTREACH (OUTPATIENT)
Dept: OTHER | Age: 60
End: 2020-02-05

## 2020-02-05 VITALS
HEART RATE: 70 BPM | TEMPERATURE: 98.2 F | SYSTOLIC BLOOD PRESSURE: 122 MMHG | OXYGEN SATURATION: 98 % | RESPIRATION RATE: 18 BRPM | DIASTOLIC BLOOD PRESSURE: 86 MMHG

## 2020-02-05 PROCEDURE — G0151 HHCP-SERV OF PT,EA 15 MIN: HCPCS

## 2020-02-05 NOTE — PROGRESS NOTES
ANURADHA Follow up call. Verified  and zip code. -Patient is progressing, working with home health and home physical therapy.   -Pain level is manageable. There were concerns of a blood clot, approximately a week after discharge. Blood clot was ruled out.   -Patient is walking around without a cane. Final in home physical therapy session is on , will start outpatient physical therapy at [facility], starting on . Incision looks healthy, no concerns of infection. Patient has her post surgery follow up on  with Dr. Nasima Holbrook. Will reach out to patient in 2-3 weeks to assess further need for care management services.

## 2020-02-07 ENCOUNTER — HOME CARE VISIT (OUTPATIENT)
Dept: SCHEDULING | Facility: HOME HEALTH | Age: 60
End: 2020-02-07
Payer: COMMERCIAL

## 2020-02-07 PROCEDURE — G0151 HHCP-SERV OF PT,EA 15 MIN: HCPCS

## 2020-02-11 ENCOUNTER — HOSPITAL ENCOUNTER (OUTPATIENT)
Dept: PHYSICAL THERAPY | Age: 60
Discharge: HOME | End: 2020-02-11
Payer: COMMERCIAL

## 2020-02-11 PROCEDURE — 97110 THERAPEUTIC EXERCISES: CPT | Performed by: PHYSICAL THERAPIST

## 2020-02-11 PROCEDURE — 97162 PT EVAL MOD COMPLEX 30 MIN: CPT | Performed by: PHYSICAL THERAPIST

## 2020-02-11 PROCEDURE — 97140 MANUAL THERAPY 1/> REGIONS: CPT | Performed by: PHYSICAL THERAPIST

## 2020-02-11 PROCEDURE — 97016 VASOPNEUMATIC DEVICE THERAPY: CPT | Performed by: PHYSICAL THERAPIST

## 2020-02-11 NOTE — PROGRESS NOTES
[address] [address] (MOB IV), [address] [facility] [address]  Phone: [phone] Fax: [phone]    Plan of Care/Statement of Necessity for Physical Therapy Services  2-15    Patient name: Aubrie Merino  : 1960  Provider#: [id number]  Referral source: Manju Valerio MD      Medical/Treatment Diagnosis: Left knee pain [M25.562]     Prior Hospitalization: see medical history     Comorbidities: Arthritis, Anxiety, BMI over 30, HBP, Prior Surgery  Prior Level of Function: completes 20 minutes of exercise seldom or never  Medications: Verified on Patient Summary List    Start of Care: 18      Onset Date: s/p TKA revision 1/15/20       The Plan of Care and following information is based on the information from the initial evaluation. Assessment/ key information: Patient presents with pain, decreased hip flexor and quad strength as well as edema consistent with s/p L TKA revision 1/15/20. Of note she reports having replacements in both knees in , with the left hardware loosening and requiring this recent surgery. She did report successful courses of therapy in , without issue. She has recovered well from the surgery to this point with A/PROM flexion measuring 120/128 and her A/PROM extension being -2/1 degree of hyperextension. The patient will benefit from guided therapeutic interventions such as therex for strengthening and neuromuscular re-eduction, manual techniques for joint mobility and soft tissue extensibility, and modalities for pain management in order to improve functional mobility with daily activities.     Evaluation Complexity History MEDIUM  Complexity : 1-2 comorbidities / personal factors will impact the outcome/ POC ; Examination MEDIUM Complexity : 3 Standardized tests and measures addressing body structure, function, activity limitation and / or participation in recreation  ;Presentation MEDIUM Complexity : Evolving with changing characteristics  ; Clinical Decision Making MEDIUM Complexity : FOTO score of 26-74  Overall Complexity Rating: MEDIUM    Problem List: pain affecting function, decrease ROM, decrease strength, edema affecting function, impaired gait/ balance, decrease ADL/ functional abilitiies, decrease activity tolerance, decrease flexibility/ joint mobility and decrease transfer abilities   Treatment Plan may include any combination of the following: Therapeutic exercise, Therapeutic activities, Neuromuscular re-education, Physical agent/modality, Gait/balance training, Manual therapy, Patient education, Self Care training, Functional mobility training, Home safety training and Stair training  Patient / Family readiness to learn indicated by: asking questions, trying to perform skills and interest  Persons(s) to be included in education: patient (P)  Barriers to Learning/Limitations: None  Patient Goal (s): normal gait, strength and endurance  Patient Self Reported Health Status: good  Rehabilitation Potential: good    Short Term Goals: To be accomplished in 2 treatments:                         1.) The patient will be independent with their HEP consistently for at least one week. Long Term Goals: To be accomplished in 16 treatments:                         1.) The patient will have at most 2/10 pain with daily activities. 2.) The patient will improve their PROM flexion to at least 130 degrees to show improvement in functional mobility. 3.) The patient will improve their FOTO score from 47 to at least 53 to show improvements in functional mobility. Frequency / Duration: Patient to be seen 2 times per week for 16 treatments.       Patient/ Caregiver education and instruction: self care, activity modification and exercises    [x]  Plan of care has been reviewed with TARIK Garza, PT 2/11/2020     ________________________________________________________________________    I certify that the above Therapy Services are being furnished while the patient is under my care. I agree with the treatment plan and certify that this therapy is necessary.     [de-identified] Signature:____________________  Date:____________Time: _________

## 2020-02-11 NOTE — PROGRESS NOTES
PT INITIAL EVALUATION NOTE 2-15    Patient Name: Caprice Ocampo  Date:2020  : 1960  [x]  Patient  Verified  Payor: [id number] / Plan: [facility] MEDICAL [place] [address] / Product Type: Commerical /    In time:1:15pm  Out time:2:20pm  Total Treatment Time (min): 65  Visit #: 1     Treatment Area:  Left knee pain [M25.562]    SUBJECTIVE  Pain Level (0-10 scale): 1 (0-4/10)  Any medication changes, allergies to medications, adverse drug reactions, diagnosis change, or new procedure performed?: [] No    [x] Yes (see summary sheet for update)  Subjective:     Pt reports having both knees replaced originally in , which she had successful rehab for. She states that in 2019 she visited MD after having pain for about 3 weeks. 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 She works as a  at [employer] and is hoping to go back to work some time in March. She has been walking without the [facility] for a few days without pain. She is still having some trouble with stairs and getting out of the car as well as sleeping through the night.      OBJECTIVE/EXAMINATION  Gait and Functional Mobility:  Slight right sided antalgic gait    A/PROM Right Knee: Flex nt/135  Ext nt/5 hyperextension  A/PROM Left Knee: Flex 120/128  Ext -2/1 hyperextension    Joint Mobility Assessment: expected level of hypomobility s/p TKA    Flexibility: expected tightness in L sided hamstrings    LOWER QUARTER   MUSCLE STRENGTH  KEY       R  L  0 - No Contraction  Knee ext  5  At least 3+  1 - Trace            flex  5  5  2 - Poor   Hip ext   nt  nt  3 - Fair          flex   5  4-  4 - Good         abd  nt  nt  5 - Normal         add  nt  nt      Ankle DF  5  5                PF  5  5                INV  nt  nt                EV  nt  nt      Neurological: Reflexes / Sensations: nt  Special Tests: nt      Modality rationale: decrease edema, decrease inflammation and decrease pain to improve the patients ability to complete ADL's. Min Type Additional Details    [] Estim: []Att   []Unatt        []TENS instruct                  []IFC  []Premod   []NMES                     []Other:  []w/US   []w/ice   []w/heat  Position:  Location:    []  Traction: [] Cervical       []Lumbar                       [] Prone          []Supine                       []Intermittent   []Continuous Lbs:  [] before manual  [] after manual  []w/heat    []  Ultrasound: []Continuous   [] Pulsed at:                            []1MHz   []3MHz Location:  W/cm2:    []  Paraffin         Location:  []w/heat    []  Ice     []  Heat  []  Ice massage Position:  Location:    []  Laser  []  Other: Position:  Location:   15 [x]  Vasopneumatic Device Pressure:       [] lo [x] med [] hi   Temperature: 34deg   [x] Skin assessment post-treatment:  [x]intact []redness- no adverse reaction    []redness  adverse reaction:     15 min Therapeutic Exercise:  [x] See flow sheet :   Rationale: increase ROM, increase strength, improve coordination, improve balance and increase proprioception to improve the patients ability to complete ADL's.    8 min Manual Therapy:  Patellar mobs, gr3-4 tib-fem mobs   Rationale: decrease pain, increase ROM, increase tissue extensibility, decrease edema , decrease trigger points and increase postural awareness  to improve the patients ability to complete ADL's.           With   [x] TE   [] TA   [] neuro   [] other: Patient Education: [x] Review HEP    [x] Progressed/Changed HEP based on:   [x] positioning   [x] body mechanics   [] transfers   [] heat/ice application    [] other:        Other Objective/Functional Measures: FOTO = 47    Pain Level (0-10 scale) post treatment: 1      ASSESSMENT:      [x]  See Plan of [address], PT 2/11/2020

## 2020-02-17 ENCOUNTER — HOSPITAL ENCOUNTER (OUTPATIENT)
Dept: PHYSICAL THERAPY | Age: 60
Discharge: HOME | End: 2020-02-17
Payer: COMMERCIAL

## 2020-02-17 LAB
BACTERIA SPEC CULT: NORMAL
SERVICE CMNT-IMP: NORMAL

## 2020-02-17 PROCEDURE — 97016 VASOPNEUMATIC DEVICE THERAPY: CPT

## 2020-02-17 PROCEDURE — 97110 THERAPEUTIC EXERCISES: CPT

## 2020-02-17 NOTE — PROGRESS NOTES
PT DAILY TREATMENT NOTE 2-15    Patient Name: Kurt Webster  Date:2020  : 1960  [x]  Patient  Verified  Payor: [employer] [place] / Plan: [facility] MEDICAL [place] [address] / Product Type: Commerical /    In time:100  Out time:213  Total Treatment Time (min): 68  Visit #:  2    Treatment Area: Left knee pain [M25.562]    SUBJECTIVE  Pain Level (0-10 scale): 0/10  Any medication changes, allergies to medications, adverse drug reactions, diagnosis change, or new procedure performed?: [x] No    [] Yes (see summary sheet for update)  Subjective functional status/changes:   [] No changes reported  Patient reports she was a bit sore after the last visit to physical therapy. She does get stiffness every now and again if she sits for to long.      OBJECTIVE    Modality rationale: decrease inflammation and decrease pain to improve the patients ability to perform ADLs and reduce pain levels   Min Type Additional Details       [] Estim: []Att   []Unatt    []TENS instruct                  []IFC  []Premod   []NMES                     []Other:  []w/US   []w/ice   []w/heat  Position:  Location:       []  Traction: [] Cervical       []Lumbar                       [] Prone          []Supine                       []Intermittent   []Continuous Lbs:  [] before manual  [] after manual  []w/heat    []  Ultrasound: []Continuous   [] Pulsed                       at: []1MHz   []3MHz Location:  W/cm2:    [] Paraffin         Location:   []w/heat    []  Ice     []  Heat  []  Ice massage Position:  Location:    []  Laser  []  Other: Position:  Location:     15 [x]  Vasopneumatic Device Pressure:       [] lo [x] med [] hi   Temperature: 34     [x] Skin assessment post-treatment:  [x]intact []redness- no adverse reaction    []redness  adverse reaction:         58 min Therapeutic Exercise:  [x] See flow sheet :   Rationale: increase ROM and increase strength to improve the patients ability to perform ADLs and reduce pain levels    With   [] TE   [] TA   [] neuro   [] other: Patient Education: [x] Review HEP    [] Progressed/Changed HEP based on:   [] positioning   [] body mechanics   [] transfers   [] heat/ice application    [] other:      Other Objective/Functional Measures: none noted     Pain Level (0-10 scale) post treatment: 0/10    ASSESSMENT/Changes in Function:   Patient demonstrates near full AROM in both flexion and extension. Good glute activation. Tolerated all therex well, will continue to progress as tolerated. Patient will continue to benefit from skilled PT services to modify and progress therapeutic interventions, address functional mobility deficits, address ROM deficits, address strength deficits, analyze and address soft tissue restrictions, analyze and cue movement patterns, analyze and modify body mechanics/ergonomics and assess and modify postural abnormalities to attain remaining goals. [x]  See Plan of Care  []  See progress note/recertification  []  See Discharge Summary         Progress towards goals / Updated goals:  Patient is progressing towards goals.      PLAN  [x]  Upgrade activities as tolerated     [x]  Continue plan of care  [x]  Update interventions per flow sheet       []  Discharge due to:_  []  Other:_      Deo Cardenas 2/17/2020

## 2020-02-20 ENCOUNTER — PATIENT OUTREACH (OUTPATIENT)
Dept: OTHER | Age: 60
End: 2020-02-20

## 2020-02-20 ENCOUNTER — HOSPITAL ENCOUNTER (OUTPATIENT)
Dept: PHYSICAL THERAPY | Age: 60
Discharge: HOME | End: 2020-02-20
Payer: COMMERCIAL

## 2020-02-20 PROCEDURE — 97110 THERAPEUTIC EXERCISES: CPT

## 2020-02-20 PROCEDURE — 97016 VASOPNEUMATIC DEVICE THERAPY: CPT

## 2020-02-20 NOTE — PROGRESS NOTES
Telephonic outreach to offer additional care management, patient currently enrolled in ANURADHA. Left a discreet VM with a request for a return call. Will attempt outreach next week.

## 2020-02-20 NOTE — PROGRESS NOTES
PT DAILY TREATMENT NOTE 2-15    Patient Name: Melissa Rosenthal  Date:2020  : 1960  [x]  Patient  Verified  Payor: MEDICAL [facility] / Plan: [facility] MEDICAL [place] [address] / Product Type: Commerical /    In time:1132  Out time:1237  Total Treatment Time (min): 65  Visit #:  3    Treatment Area: Left knee pain [M25.562]    SUBJECTIVE  Pain Level (0-10 scale): 0/10  Any medication changes, allergies to medications, adverse drug reactions, diagnosis change, or new procedure performed?: [x] No    [] Yes (see summary sheet for update)  Subjective functional status/changes:   [] No changes reported  Patient reports she walked around the mall for a few hours. She was able to do it but by the time she got back into her car she could tell she was exhausted and achy. It did not last much longer than a few hours until she felt back to normal again.     OBJECTIVE    Modality rationale: decrease inflammation and decrease pain to improve the patients ability to perform ADLs and reduce pain levels   Min Type Additional Details       [] Estim: []Att   []Unatt    []TENS instruct                  []IFC  []Premod   []NMES                     []Other:  []w/US   []w/ice   []w/heat  Position:  Location:       []  Traction: [] Cervical       []Lumbar                       [] Prone          []Supine                       []Intermittent   []Continuous Lbs:  [] before manual  [] after manual  []w/heat    []  Ultrasound: []Continuous   [] Pulsed                       at: []1MHz   []3MHz Location:  W/cm2:    [] Paraffin         Location:   []w/heat    []  Ice     []  Heat  []  Ice massage Position:  Location:    []  Laser  []  Other: Position:  Location:     15 _  Vasopneumatic Device Pressure:       [] lo [x] med [] hi   Temperature:34      [x] Skin assessment post-treatment:  [x]intact []redness- no adverse reaction    []redness  adverse reaction:         50 min Therapeutic Exercise:  [x] See flow sheet : Rationale: increase ROM and increase strength to improve the patients ability to perform ADLs and reduce pain levels. 0000    With   [] TE   [] TA   [] neuro   [] other: Patient Education: [x] Review HEP    [] Progressed/Changed HEP based on:   [] positioning   [] body mechanics   [] transfers   [] heat/ice application    [] other:      Other Objective/Functional Measures: none noted     Pain Level (0-10 scale) post treatment: 0/10    ASSESSMENT/Changes in Function:   Needed to lower the TG for a reduction in pain and increased knee flexion. Fatigued quickly when performing TKE. Tolerated all therex, will continue to progress as tolerated. Patient will continue to benefit from skilled PT services to modify and progress therapeutic interventions, address functional mobility deficits, address ROM deficits, address strength deficits, analyze and address soft tissue restrictions, analyze and cue movement patterns, analyze and modify body mechanics/ergonomics and assess and modify postural abnormalities to attain remaining goals. [x]  See Plan of Care  []  See progress note/recertification  []  See Discharge Summary         Progress towards goals / Updated goals:  Patient is progressing towards goals.     PLAN  [x]  Upgrade activities as tolerated     [x]  Continue plan of care  [x]  Update interventions per flow sheet       []  Discharge due to:_  []  Other:_      Bonnie Barcenas 2/20/2020

## 2020-02-24 ENCOUNTER — HOSPITAL ENCOUNTER (OUTPATIENT)
Dept: PHYSICAL THERAPY | Age: 60
Discharge: HOME | End: 2020-02-24
Payer: COMMERCIAL

## 2020-02-24 PROCEDURE — 97016 VASOPNEUMATIC DEVICE THERAPY: CPT

## 2020-02-24 PROCEDURE — 97110 THERAPEUTIC EXERCISES: CPT

## 2020-02-24 NOTE — PROGRESS NOTES
PT DAILY TREATMENT NOTE 2-15    Patient Name: Melissa Rosenthal  Date:2020  : 1960  [x]  Patient  Verified  Payor: MEDICAL [facility] / Plan: [facility] MEDICAL [place] [address] / Product Type: Commerical /    In time:130  Out time:244  Total Treatment Time (min): 74  Visit #:  4    Treatment Area: Left knee pain [M25.562]    SUBJECTIVE  Pain Level (0-10 scale): 0/10  Any medication changes, allergies to medications, adverse drug reactions, diagnosis change, or new procedure performed?: [x] No    [] Yes (see summary sheet for update)  Subjective functional status/changes:   [] No changes reported  Patient reports no major changes since last visit.     OBJECTIVE    Modality rationale: decrease inflammation and decrease pain to improve the patients ability to perform ADLs and reduce pain levels   Min Type Additional Details       [] Estim: []Att   []Unatt    []TENS instruct                  []IFC  []Premod   []NMES                     []Other:  []w/US   []w/ice   []w/heat  Position:  Location:       []  Traction: [] Cervical       []Lumbar                       [] Prone          []Supine                       []Intermittent   []Continuous Lbs:  [] before manual  [] after manual  []w/heat    []  Ultrasound: []Continuous   [] Pulsed                       at: []1MHz   []3MHz Location:  W/cm2:    [] Paraffin         Location:   []w/heat    []  Ice     []  Heat  []  Ice massage Position:  Location:    []  Laser  []  Other: Position:  Location:     15 [x]  Vasopneumatic Device Pressure:       [] lo [x] med [] hi   Temperature: 34     [x] Skin assessment post-treatment:  [x]intact []redness- no adverse reaction    []redness  adverse reaction:         59 min Therapeutic Exercise:  [x] See flow sheet :   Rationale: increase ROM and increase strength to improve the patients ability to perform ADLs and reduce pain levels          With   [] TE   [] TA   [] neuro   [] other: Patient Education: [x] Review HEP    [] Progressed/Changed HEP based on:   [] positioning   [] body mechanics   [] transfers   [] heat/ice application    [] other:      Other Objective/Functional Measures: none noted     Pain Level (0-10 scale) post treatment: 0/10    ASSESSMENT/Changes in Function:   Patient will experience minor discomfort when performing hamstring activation while on the bike. Continues to fatigue quickly with quad strengthening therex. Tolerated all therex well, will continue to progress as tolerated. Patient will continue to benefit from skilled PT services to modify and progress therapeutic interventions, address functional mobility deficits, address ROM deficits, address strength deficits, analyze and address soft tissue restrictions, analyze and cue movement patterns, analyze and modify body mechanics/ergonomics and assess and modify postural abnormalities to attain remaining goals. [x]  See Plan of Care  []  See progress note/recertification  []  See Discharge Summary         Progress towards goals / Updated goals:  Patient is progressing towards goals.      PLAN  [x]  Upgrade activities as tolerated     [x]  Continue plan of care  [x]  Update interventions per flow sheet       []  Discharge due to:_  []  Other:_      Benigno Durand 2/24/2020

## 2020-02-25 ENCOUNTER — PATIENT OUTREACH (OUTPATIENT)
Dept: OTHER | Age: 60
End: 2020-02-25

## 2020-02-25 NOTE — LETTER
2/25/2020 12:39 PM 
 
Ms. Jayna Olson [address] Dear Ms. Kain Carbal, My name is Colleen Deal, Employee Care Manager for [facility] and I have been trying to reach you. The Employee Care Management [facility]) program is a free-of-charge confidential service provided to our employees and their family members covered by the [facility] and the [facility]. The program will provide an employee and his/her family with the [facility] expertise to assist in navigating the health care delivery system, provider services, and their overall care needsso as to assure and improve health care interactions and enhance the quality of life. This program is designed to provide you with the opportunity to have a [facility] care manager partner with you for the following services: 
 
 1) when you come home from the hospital or emergency room 2) when help is needed to manage your disease 3) when you need assistance coordinating services or appointments [facility] is dedicated to empowering the good health of its community and improving the quality of care and care experiences for employees and their families. We are committed to safeguarding patient confidentiality and privacy, assuring that every employee has the respect he or she deserves in managing their health. The information shared with your care manager will not be shared with anyone else aside from those you identify as part of your care team, and will only be used to assist you with any identified care needs. Please contact me if you would like this service provided to you. Sincerely, BELL Pastrana RN  Employee Care Manager [address] [place] [address] [address] [facility] Cell [phone] Fax [email or web address]

## 2020-02-25 NOTE — PROGRESS NOTES
Resolving current episode ANURADHA(Transitions of care complete). No further ED/UC or hospital admissions within 30 days post discharge. Patient attended follow-up appointments as directed. No outreach from patient to [address] [place] [address]. Second telephone outreach attempte to offer additional care management services. Left discreet voicemail with this CM confidential contact information. Will send UTR letter.

## 2020-02-27 ENCOUNTER — HOSPITAL ENCOUNTER (OUTPATIENT)
Dept: PHYSICAL THERAPY | Age: 60
Discharge: HOME | End: 2020-02-27
Payer: COMMERCIAL

## 2020-02-27 PROCEDURE — 97110 THERAPEUTIC EXERCISES: CPT | Performed by: PHYSICAL THERAPIST

## 2020-02-27 PROCEDURE — 97016 VASOPNEUMATIC DEVICE THERAPY: CPT | Performed by: PHYSICAL THERAPIST

## 2020-02-27 NOTE — PROGRESS NOTES
PT DAILY TREATMENT NOTE 2-15    Patient Name: Stefan Dario  Date:2020  : 1960  [x]  Patient  Verified  Payor: [employer] [place] / Plan: [facility] MEDICAL [place] [address] / Product Type: Commerical /    In time: 12:00pm  Out time: 1:15pm  Total Treatment Time (min): 75  Visit #:  5    Treatment Area: Left knee pain [M25.562]    SUBJECTIVE  Pain Level (0-10 scale): 0  Any medication changes, allergies to medications, adverse drug reactions, diagnosis change, or new procedure performed?: [x] No    [] Yes (see summary sheet for update)  Subjective functional status/changes:   [] No changes reported  Patient reports her knee is feeling very good lately, still some discomfort when descending stairs along with some point tenderness along the lateral patella.     OBJECTIVE    Modality rationale: decrease inflammation and decrease pain to improve the patients ability to perform ADLs and reduce pain levels   Min Type Additional Details       [] Estim: []Att   []Unatt    []TENS instruct                  []IFC  []Premod   []NMES                     []Other:  []w/US   []w/ice   []w/heat  Position:  Location:       []  Traction: [] Cervical       []Lumbar                       [] Prone          []Supine                       []Intermittent   []Continuous Lbs:  [] before manual  [] after manual  []w/heat    []  Ultrasound: []Continuous   [] Pulsed                       at: []1MHz   []3MHz Location:  W/cm2:    [] Paraffin         Location:   []w/heat    []  Ice     []  Heat  []  Ice massage Position:  Location:    []  Laser  []  Other: Position:  Location:     15 [x]  Vasopneumatic Device Pressure:       [] lo [x] med [] hi   Temperature: 34     [x] Skin assessment post-treatment:  [x]intact []redness- no adverse reaction    []redness  adverse reaction:         60 min Therapeutic Exercise:  [x] See flow sheet :   Rationale: increase ROM and increase strength to improve the patients ability to perform ADLs and reduce pain levels          With   [x] TE   [] TA   [] neuro   [] other: Patient Education: [x] Review HEP    [x] Progressed/Changed HEP based on:   [x] positioning   [x] body mechanics   [] transfers   [] heat/ice application    [] other:      Other Objective/Functional Measures: none noted     Pain Level (0-10 scale) post treatment: 0    ASSESSMENT/Changes in Function:   Patient tolerated increased work today, especially with eccentric control and dynamic stability. We will continue to progress as tolerated. Patient will continue to benefit from skilled PT services to modify and progress therapeutic interventions, address functional mobility deficits, address ROM deficits, address strength deficits, analyze and address soft tissue restrictions, analyze and cue movement patterns, analyze and modify body mechanics/ergonomics and assess and modify postural abnormalities to attain remaining goals. [x]  See Plan of Care  []  See progress note/recertification  []  See Discharge Summary         Progress towards goals / Updated goals:  Patient is progressing towards goals.      PLAN  [x]  Upgrade activities as tolerated     [x]  Continue plan of care  [x]  Update interventions per flow sheet       []  Discharge due to:_  []  Other:_      Vera Zapata, PT 2/27/2020

## 2020-02-28 LAB
ACID FAST STN SPEC: NEGATIVE
ACID FAST STN SPEC: NEGATIVE
MYCOBACTERIUM SPEC QL CULT: NEGATIVE
MYCOBACTERIUM SPEC QL CULT: NEGATIVE
SPECIMEN PREPARATION: NORMAL
SPECIMEN PREPARATION: NORMAL
SPECIMEN SOURCE: NORMAL
SPECIMEN SOURCE: NORMAL

## 2020-02-29 LAB
ACID FAST STN SPEC: NEGATIVE
MYCOBACTERIUM SPEC QL CULT: NEGATIVE
SPECIMEN PREPARATION: NORMAL
SPECIMEN SOURCE: NORMAL

## 2020-03-02 ENCOUNTER — HOSPITAL ENCOUNTER (OUTPATIENT)
Dept: PHYSICAL THERAPY | Age: 60
Discharge: HOME | End: 2020-03-02
Payer: COMMERCIAL

## 2020-03-02 PROCEDURE — 97110 THERAPEUTIC EXERCISES: CPT | Performed by: PHYSICAL THERAPIST

## 2020-03-02 PROCEDURE — 97016 VASOPNEUMATIC DEVICE THERAPY: CPT | Performed by: PHYSICAL THERAPIST

## 2020-03-02 NOTE — PROGRESS NOTES
PT DAILY TREATMENT NOTE 2-15    Patient Name: Diogo Brown  Date:3/2/2020  : 1960  [x]  Patient  Verified  Payor: MEDICAL [place] / Plan: [facility] MEDICAL [address] / Product Type: Commerical /    In time: 1:00pm  Out time: 2:04pm  Total Treatment Time (min): 64  Visit #:  6     Treatment Area: Left knee pain [M25.562]    SUBJECTIVE  Pain Level (0-10 scale): 2  Any medication changes, allergies to medications, adverse drug reactions, diagnosis change, or new procedure performed?: [x] No    [] Yes (see summary sheet for update)  Subjective functional status/changes:   [] No changes reported  Patient reports she walked nearly 4 miles yesterday and although it felt fine during the exercise, she is a little more sore today especially in the back of the knee and along the scar.     OBJECTIVE    Modality rationale: decrease inflammation and decrease pain to improve the patients ability to perform ADLs and reduce pain levels   Min Type Additional Details       [] Estim: []Att   []Unatt    []TENS instruct                  []IFC  []Premod   []NMES                     []Other:  []w/US   []w/ice   []w/heat  Position:  Location:       []  Traction: [] Cervical       []Lumbar                       [] Prone          []Supine                       []Intermittent   []Continuous Lbs:  [] before manual  [] after manual  []w/heat    []  Ultrasound: []Continuous   [] Pulsed                       at: []1MHz   []3MHz Location:  W/cm2:    [] Paraffin         Location:   []w/heat    []  Ice     []  Heat  []  Ice massage Position:  Location:    []  Laser  []  Other: Position:  Location:     15 [x]  Vasopneumatic Device Pressure:       [] lo [x] med [] hi   Temperature: 34     [x] Skin assessment post-treatment:  [x]intact []redness- no adverse reaction    []redness  adverse reaction:     49 min Therapeutic Exercise:  [x] See flow sheet :   Rationale: increase ROM and increase strength to improve the patients ability to perform ADLs and reduce pain levels          With   [x] TE   [] TA   [] neuro   [] other: Patient Education: [x] Review HEP    [x] Progressed/Changed HEP based on:   [x] positioning   [x] body mechanics   [] transfers   [] heat/ice application    [] other:      Other Objective/Functional Measures: none noted     Pain Level (0-10 scale) post treatment: 0    ASSESSMENT/Changes in Function:   The patient felt good relief after some guided therex and ice. She did well progressing to more functional exercises such as squat rows without any increase in pain. Patient will continue to benefit from skilled PT services to modify and progress therapeutic interventions, address functional mobility deficits, address ROM deficits, address strength deficits, analyze and address soft tissue restrictions, analyze and cue movement patterns, analyze and modify body mechanics/ergonomics and assess and modify postural abnormalities to attain remaining goals. [x]  See Plan of Care  []  See progress note/recertification  []  See Discharge Summary         Progress towards goals / Updated goals:  Patient is progressing towards goals.      PLAN  [x]  Upgrade activities as tolerated     [x]  Continue plan of care  [x]  Update interventions per flow sheet       []  Discharge due to:_  []  Other:_      Pat Bolivar, PT 3/2/2020

## 2020-03-05 ENCOUNTER — APPOINTMENT (OUTPATIENT)
Dept: PHYSICAL THERAPY | Age: 60
End: 2020-03-05
Payer: COMMERCIAL

## 2020-03-09 ENCOUNTER — HOSPITAL ENCOUNTER (OUTPATIENT)
Dept: PHYSICAL THERAPY | Age: 60
Discharge: HOME | End: 2020-03-09
Payer: COMMERCIAL

## 2020-03-09 PROCEDURE — 97110 THERAPEUTIC EXERCISES: CPT | Performed by: PHYSICAL THERAPIST

## 2020-03-09 PROCEDURE — 97016 VASOPNEUMATIC DEVICE THERAPY: CPT | Performed by: PHYSICAL THERAPIST

## 2020-03-09 NOTE — PROGRESS NOTES
PT DAILY TREATMENT NOTE 2-15    Patient Name: Kale Ramirez  Date:3/9/2020  : 1960  [x]  Patient  Verified  Payor: MEDICAL [facility] / Plan: [facility] MEDICAL [place] [address] / Product Type: Commerical /    In time: 1:00pm  Out time: 2:05pm  Total Treatment Time (min): 65  Visit #:  7     Treatment Area: Left knee pain [M25.562]    SUBJECTIVE  Pain Level (0-10 scale): 0  Any medication changes, allergies to medications, adverse drug reactions, diagnosis change, or new procedure performed?: [x] No    [] Yes (see summary sheet for update)  Subjective functional status/changes:   [] No changes reported  The patient reports some soreness in the posterolateral aspect of her knee from increased walking over the weekend.     OBJECTIVE    Modality rationale: decrease inflammation and decrease pain to improve the patients ability to perform ADLs and reduce pain levels   Min Type Additional Details       [] Estim: []Att   []Unatt    []TENS instruct                  []IFC  []Premod   []NMES                     []Other:  []w/US   []w/ice   []w/heat  Position:  Location:       []  Traction: [] Cervical       []Lumbar                       [] Prone          []Supine                       []Intermittent   []Continuous Lbs:  [] before manual  [] after manual  []w/heat    []  Ultrasound: []Continuous   [] Pulsed                       at: []1MHz   []3MHz Location:  W/cm2:    [] Paraffin         Location:   []w/heat    []  Ice     []  Heat  []  Ice massage Position:  Location:    []  Laser  []  Other: Position:  Location:     15 [x]  Vasopneumatic Device Pressure:       [] lo [x] med [] hi   Temperature: 34     [x] Skin assessment post-treatment:  [x]intact []redness- no adverse reaction    []redness  adverse reaction:     50 min Therapeutic Exercise:  [x] See flow sheet :   Rationale: increase ROM and increase strength to improve the patients ability to perform ADLs and reduce pain levels          With [x] TE   [] TA   [] neuro   [] other: Patient Education: [x] Review HEP    [x] Progressed/Changed HEP based on:   [x] positioning   [x] body mechanics   [] transfers   [] heat/ice application    [] other:      Other Objective/Functional Measures: FOTO= 55 (47 at eval); A/PROM: Flex= nt/134; Ext= 1 hyper/nt     Pain Level (0-10 scale) post treatment: 0    ASSESSMENT/Changes in Function:   The patient tolerated increased squatting and dynamic balance activities with only minor reports of increased knee pain that resolved with rest.  Patient will continue to benefit from skilled PT services to modify and progress therapeutic interventions, address functional mobility deficits, address ROM deficits, address strength deficits, analyze and address soft tissue restrictions, analyze and cue movement patterns, analyze and modify body mechanics/ergonomics and assess and modify postural abnormalities to attain remaining goals. [x]  See Plan of Care  [x]  See progress note/recertification  []  See Discharge Summary         Progress towards goals / Updated goals:  Patient is progressing towards goals.      PLAN  [x]  Upgrade activities as tolerated     [x]  Continue plan of care  [x]  Update interventions per flow sheet       []  Discharge due to:_  []  Other:_      Claritza Cardenas PT 3/9/2020

## 2020-03-09 NOTE — PROGRESS NOTES
[employer] [facility]  [address] ([place] [place]), [address] [place]  Phone: [phone] Fax: [phone]    Progress Note    Name: Aide Joyner   : 1960   MD: Sharon Gibson MD       Treatment Diagnosis: Left knee pain [M25.562]  Start of Care: 20    Visits from Start of Care: 7  Missed Visits: 0    Summary of Care: Care has consisted of guided therapeutic exercise, manual therapy and modality use as indicated. Assessment / Recommendations:  Patient has made good improvement with A/PROM: Flex= nt/134 (120/128); Ext= 1 hyperextension/nt (-2/ hyperextension) s/p left TKA revision 1/15/20. She has also improved with strength and her overall activity tolerance (fatiguing after 1.5 hours on her feet), reporting negotiating stairs with a step over step pattern being her biggest limitation. She reports her plan to return to work on 3/23/20 and will most likely be ready for this, with endurance being her main limitation. She will benefit from continued guided therapeutic interventions as she transitions back to work. Short Term Goals: To be accomplished in 2 treatments:                         7.) The patient will be independent with their HEP consistently for at least one week. - MET  Long Term Goals: To be accomplished in 16 treatments:                         2.) The patient will have at most 2/10 pain with daily activities. - Frequently MET                         2.) The patient will improve their PROM flexion to at least 130 degrees to show improvement in functional mobility. - MET (134 today)                         3.) The patient will improve their FOTO score from 47 to at least 53 to show improvements in functional mobility.- MET (55 today)    New Goal:    4.) The patient will be able to negotiate at least 3 steps with step over step pattern with minimal to no pain.      Other: Continue 1-2x/wk for 8 more treatments      [employer], PT 3/9/2020 ________________________________________________________________________  NOTE TO PHYSICIAN:  Please complete the following and fax to: [facility]: [phone]  . Retain this original for your records. If you are unable to process this request in 24 hours, please contact our office.        ____ I have read the above report and request that my patient continue therapy with the following changes/special instructions:  ____ I have read the above report and request that my patient be discharged from therapy    Physician's Signature:_________________ Date:___________Time:__________

## 2020-03-12 ENCOUNTER — APPOINTMENT (OUTPATIENT)
Dept: PHYSICAL THERAPY | Age: 60
End: 2020-03-12
Payer: COMMERCIAL

## 2020-03-16 DIAGNOSIS — N95.1 MENOPAUSAL SYNDROME (HOT FLASHES): ICD-10-CM

## 2020-03-16 DIAGNOSIS — M15.9 GENERALIZED OSTEOARTHRITIS: ICD-10-CM

## 2020-03-16 DIAGNOSIS — I10 ESSENTIAL HYPERTENSION WITH GOAL BLOOD PRESSURE LESS THAN 140/90: ICD-10-CM

## 2020-03-17 ENCOUNTER — HOSPITAL ENCOUNTER (OUTPATIENT)
Dept: PHYSICAL THERAPY | Age: 60
Discharge: HOME | End: 2020-03-17
Payer: COMMERCIAL

## 2020-03-17 ENCOUNTER — APPOINTMENT (OUTPATIENT)
Dept: PHYSICAL THERAPY | Age: 60
End: 2020-03-17
Payer: COMMERCIAL

## 2020-03-17 PROCEDURE — 97110 THERAPEUTIC EXERCISES: CPT

## 2020-03-17 PROCEDURE — 97016 VASOPNEUMATIC DEVICE THERAPY: CPT

## 2020-03-17 RX ORDER — PAROXETINE HYDROCHLORIDE 40 MG/1
TABLET, FILM COATED ORAL
Qty: 90 TAB | Refills: 0 | Status: SHIPPED | OUTPATIENT
Start: 2020-03-17 | End: 2020-04-20 | Stop reason: SDUPTHER

## 2020-03-17 RX ORDER — CELECOXIB 200 MG/1
CAPSULE ORAL
Qty: 90 CAP | Refills: 0 | Status: SHIPPED | OUTPATIENT
Start: 2020-03-17 | End: 2020-04-20 | Stop reason: SDUPTHER

## 2020-03-17 RX ORDER — LISINOPRIL 20 MG/1
TABLET ORAL
Qty: 90 TAB | Refills: 0 | Status: SHIPPED | OUTPATIENT
Start: 2020-03-17 | End: 2020-04-20 | Stop reason: SDUPTHER

## 2020-03-17 NOTE — PROGRESS NOTES
PT DAILY TREATMENT NOTE 2-15    Patient Name: Evelina Justin  Date:3/17/2020  : 1960  [x]  Patient  Verified  Payor: [facility] [employer] [place] / Plan: [facility] MEDICAL [place] [address] / Product Type: Commerical /    In time:1230  Out time:140  Total Treatment Time (min): 70  Visit #:  8    Treatment Area: Left knee pain [M25.562]    SUBJECTIVE  Pain Level (0-10 scale): 0/10  Any medication changes, allergies to medications, adverse drug reactions, diagnosis change, or new procedure performed?: [x] No    [] Yes (see summary sheet for update)  Subjective functional status/changes:   [] No changes reported  Patient reports no major changes since last visit.     OBJECTIVE    Modality rationale: decrease inflammation and decrease pain to improve the patients ability to perform ADLs and reduce pain levels   Min Type Additional Details       [] Estim: []Att   []Unatt    []TENS instruct                  []IFC  []Premod   []NMES                     []Other:  []w/US   []w/ice   []w/heat  Position:  Location:       []  Traction: [] Cervical       []Lumbar                       [] Prone          []Supine                       []Intermittent   []Continuous Lbs:  [] before manual  [] after manual  []w/heat    []  Ultrasound: []Continuous   [] Pulsed                       at: []1MHz   []3MHz Location:  W/cm2:    [] Paraffin         Location:   []w/heat    []  Ice     []  Heat  []  Ice massage Position:  Location:    []  Laser  []  Other: Position:  Location:     15 [x]  Vasopneumatic Device Pressure:       [] lo [x] med [] hi   Temperature:34      [x] Skin assessment post-treatment:  [x]intact []redness- no adverse reaction    []redness  adverse reaction:         55 min Therapeutic Exercise:  [x] See flow sheet :   Rationale: increase ROM and increase strength to improve the patients ability to perform ADLs and reduce pain levels    With   [] TE   [] TA   [] neuro   [] other: Patient Education: [x] Review HEP [] Progressed/Changed HEP based on:   [] positioning   [] body mechanics   [] transfers   [] heat/ice application    [] other:      Other Objective/Functional Measures: none noted     Pain Level (0-10 scale) post treatment: 0/10    ASSESSMENT/Changes in Function:   Patient will experience discomfort along the lateral side of her knee when performing stability therex. Tolerated all therex, will continue to progress as tolerated. Patient will continue to benefit from skilled PT services to modify and progress therapeutic interventions, address functional mobility deficits, address ROM deficits, address strength deficits, analyze and address soft tissue restrictions, analyze and cue movement patterns, analyze and modify body mechanics/ergonomics and assess and modify postural abnormalities to attain remaining goals. [x]  See Plan of Care  []  See progress note/recertification  []  See Discharge Summary         Progress towards goals / Updated goals:  Patient is progressing towards goals.      PLAN  [x]  Upgrade activities as tolerated     [x]  Continue plan of care  [x]  Update interventions per flow sheet       []  Discharge due to:_  []  Other:_      Yanci Camara 3/17/2020

## 2020-03-19 ENCOUNTER — APPOINTMENT (OUTPATIENT)
Dept: PHYSICAL THERAPY | Age: 60
End: 2020-03-19
Payer: COMMERCIAL

## 2020-03-19 ENCOUNTER — HOSPITAL ENCOUNTER (OUTPATIENT)
Dept: PHYSICAL THERAPY | Age: 60
End: 2020-03-19
Payer: COMMERCIAL

## 2020-03-23 ENCOUNTER — HOSPITAL ENCOUNTER (OUTPATIENT)
Dept: PHYSICAL THERAPY | Age: 60
Discharge: HOME | End: 2020-03-23
Payer: COMMERCIAL

## 2020-03-23 PROCEDURE — 97110 THERAPEUTIC EXERCISES: CPT

## 2020-03-23 PROCEDURE — 97016 VASOPNEUMATIC DEVICE THERAPY: CPT

## 2020-03-23 NOTE — PROGRESS NOTES
PT DAILY TREATMENT NOTE 2-15    Patient Name: Aide Joyner  Date:3/23/2020  : 1960  [x]  Patient  Verified  Payor: MEDICAL [facility] / Plan: [facility] MEDICAL [place] [address] / Product Type: Commerical /    In time:230  Out time:335  Total Treatment Time (min): 65  Visit #:  9    Treatment Area: Left knee pain [M25.562]    SUBJECTIVE  Pain Level (0-10 scale): 2/10  Any medication changes, allergies to medications, adverse drug reactions, diagnosis change, or new procedure performed?: [x] No    [] Yes (see summary sheet for update)  Subjective functional status/changes:   [] No changes reported  Patient reports she continues to experience the same pain on the side of her knee.      OBJECTIVE    Modality rationale: decrease inflammation and decrease pain to improve the patients ability to perform ADLs and reduce pain levels   Min Type Additional Details       [] Estim: []Att   []Unatt    []TENS instruct                  []IFC  []Premod   []NMES                     []Other:  []w/US   []w/ice   []w/heat  Position:  Location:       []  Traction: [] Cervical       []Lumbar                       [] Prone          []Supine                       []Intermittent   []Continuous Lbs:  [] before manual  [] after manual  []w/heat    []  Ultrasound: []Continuous   [] Pulsed                       at: []1MHz   []3MHz Location:  W/cm2:    [] Paraffin         Location:   []w/heat    []  Ice     []  Heat  []  Ice massage Position:  Location:    []  Laser  []  Other: Position:  Location:   15   [x]  Vasopneumatic Device Pressure:       [] lo [x] med [] hi   Temperature: 34      [x] Skin assessment post-treatment:  [x]intact []redness- no adverse reaction    []redness  adverse reaction:         50 min Therapeutic Exercise:  [x] See flow sheet :   Rationale: increase ROM and increase strength to improve the patients ability to perform ADLs and reduce pain levels          With   [] TE   [] TA   [] neuro   [] other: Patient Education: [x] Review HEP    [] Progressed/Changed HEP based on:   [] positioning   [] body mechanics   [] transfers   [] heat/ice application    [] other:      Other Objective/Functional Measures: none noted     Pain Level (0-10 scale) post treatment: 2/10    ASSESSMENT/Changes in Function:   Patient continues to get pain in hip and knee flexion on the lateral side of her knee. Focused primarily on glute med strengthening. Tolerated well, will continue to progress as tolerated. Patient will continue to benefit from skilled PT services to modify and progress therapeutic interventions, address functional mobility deficits, address ROM deficits, address strength deficits, analyze and address soft tissue restrictions, analyze and cue movement patterns, analyze and modify body mechanics/ergonomics and assess and modify postural abnormalities to attain remaining goals. []  See Plan of Care  []  See progress note/recertification  []  See Discharge Summary         Progress towards goals / Updated goals:  Patient is progressing towards goals.      PLAN  []  Upgrade activities as tolerated     []  Continue plan of care  []  Update interventions per flow sheet       []  Discharge due to:_  []  Other:_      Enma Diaz 3/23/2020

## 2020-03-25 ENCOUNTER — HOSPITAL ENCOUNTER (OUTPATIENT)
Dept: PHYSICAL THERAPY | Age: 60
Discharge: HOME | End: 2020-03-25
Payer: COMMERCIAL

## 2020-03-25 PROCEDURE — 97016 VASOPNEUMATIC DEVICE THERAPY: CPT | Performed by: PHYSICAL THERAPIST

## 2020-03-25 PROCEDURE — 97110 THERAPEUTIC EXERCISES: CPT | Performed by: PHYSICAL THERAPIST

## 2020-03-25 NOTE — PROGRESS NOTES
[facility] rvaPT DAILY TREATMENT NOTE 2-15    Patient Name: Roberto Ramirez  Date:3/25/2020  : 1960  [x]  Patient  Verified  Payor: MEDICAL [employer] [facility] / Plan: [facility] MEDICAL [place] [address] / Product Type: Commerical /    In time:331  Out time:434  Total Treatment Time (min): 63  Visit #:  10    Treatment Area: Left knee pain [M25.562]    SUBJECTIVE  Pain Level (0-10 scale): 1/10  Any medication changes, allergies to medications, adverse drug reactions, diagnosis change, or new procedure performed?: [x] No    [] Yes (see summary sheet for update)  Subjective functional status/changes:   [] No changes reported. Pt complains that she has some discomfort in the back of her knee and leg. Indicates the hamstring tendon laterally.         OBJECTIVE    Modality rationale: decrease inflammation and decrease pain to improve the patients ability to perform ADLs and reduce pain levels   Min Type Additional Details       [] Estim: []Att   []Unatt    []TENS instruct                  []IFC  []Premod   []NMES                     []Other:  []w/US   []w/ice   []w/heat  Position:  Location:       []  Traction: [] Cervical       []Lumbar                       [] Prone          []Supine                       []Intermittent   []Continuous Lbs:  [] before manual  [] after manual  []w/heat    []  Ultrasound: []Continuous   [] Pulsed                       at: []1MHz   []3MHz Location:  W/cm2:    [] Paraffin         Location:   []w/heat    []  Ice     []  Heat  []  Ice massage Position:  Location:    []  Laser  []  Other: Position:  Location:   15   [x]  Vasopneumatic Device Pressure:       [] lo [x] med [] hi   Temperature: 34      [x] Skin assessment post-treatment:  [x]intact []redness- no adverse reaction    []redness  adverse reaction:         50 min Therapeutic Exercise:  [x] See flow sheet :   Rationale: increase ROM and increase strength to improve the patients ability to perform ADLs and reduce pain levels          With   [] TE   [] TA   [] neuro   [] other: Patient Education: [x] Review HEP    [] Progressed/Changed HEP based on:   [] positioning   [] body mechanics   [] transfers   [] heat/ice application    [] other:      Other Objective/Functional Measures: none noted     Pain Level (0-10 scale) post treatment: 2/10    ASSESSMENT/Changes in Function:   Pt was able to complete all activity well with minimal discomfort. Pain that she describes is over the biceps femoris insertion. Will continue to work on strength and stabilization   Patient will continue to benefit from skilled PT services to modify and progress therapeutic interventions, address functional mobility deficits, address ROM deficits, address strength deficits, analyze and address soft tissue restrictions, analyze and cue movement patterns, analyze and modify body mechanics/ergonomics and assess and modify postural abnormalities to attain remaining goals. []  See Plan of Care  []  See progress note/recertification  []  See Discharge Summary         Progress towards goals / Updated goals:  Patient is progressing towards goals.      PLAN  [x]  Upgrade activities as tolerated     [x]  Continue plan of care  [x]  Update interventions per flow sheet       []  Discharge due to:_  []  Other:_      Gina Lemus, PT 3/25/2020

## 2020-03-31 ENCOUNTER — HOSPITAL ENCOUNTER (OUTPATIENT)
Dept: PHYSICAL THERAPY | Age: 60
Discharge: HOME | End: 2020-03-31
Payer: COMMERCIAL

## 2020-03-31 PROCEDURE — 97110 THERAPEUTIC EXERCISES: CPT | Performed by: PHYSICAL THERAPIST

## 2020-03-31 PROCEDURE — 97016 VASOPNEUMATIC DEVICE THERAPY: CPT | Performed by: PHYSICAL THERAPIST

## 2020-03-31 NOTE — PROGRESS NOTES
[facility]  [address]  Phone: [phone] Fax: [phone]    Progress Note    Name: Matthew Beal   : 1960   MD: Michelle Acosta MD       Treatment Diagnosis: Left knee pain [M25.562]  Start of Care: 20    Visits from Start of Care: 7  Missed Visits: 0    Summary of Care: Therapy has consisted of guided therapeutic exercise, manual therapy and modalities for strengthening, ROM, and pain/edema management s/p left TKA revision on 1/15/20. Assessment / Recommendations: The patient has progressed significantly with full A/PROM: Flexion= 130/135; Ext= 0/0, and her pain has been manageable. She mainly has discomfort at the area of the lateral distal hamstring, especially when she straightens her leg. She is walking 1.5 miles daily, but will be a little stiff afterwards. She does continued to have difficulty going down steps, but is doing better with this. She is returning to work on 20 and will benefit from being seen 1x/wk for 4 more treatments as she transitions back to work to manage any flare ups and progress her unilateral strengthening, as this is still limited. Short Term Goals: To be accomplished in 2 treatments:                         8.) The patient will be independent with their HEP consistently for at least one week. - MET  Long Term Goals: To be accomplished in 16 treatments:                         1.) The patient will have at most 2/10 pain with daily activities.  - Frequently MET                         4.) The patient will improve their PROM flexion to at least 130 degrees to show improvement in functional mobility. - MET (135 today)                         3.) The patient will improve their FOTO score from 47 to at least 53 to show improvements in functional mobility.- MET (58 today)    New Goal:    4.) The patient will be able to negotiate at least 3 steps with step over step pattern with minimal to no pain. - Progressing    5.) The patient will be able to work a full day without a significant flare up or less than 3/10 pain. Other: Continue 1x/wk for 4 more treatments      Delano Garrett, PT 3/31/2020     ________________________________________________________________________  NOTE TO PHYSICIAN:  Please complete the following and fax to: [facility]: [phone]  . Retain this original for your records. If you are unable to process this request in 24 hours, please contact our office.        ____ I have read the above report and request that my patient continue therapy with the following changes/special instructions:  ____ I have read the above report and request that my patient be discharged from therapy    Physician's Signature:_________________ Date:___________Time:__________

## 2020-03-31 NOTE — PROGRESS NOTES
PT DAILY TREATMENT NOTE 2-15    Patient Name: Parul Perez  Date:3/31/2020  : 1960  [x]  Patient  Verified  Payor: [employer] [place] / Plan: [facility] MEDICAL [place] [address] / Product Type: Commerical /    In time: 11:30am Out time: 12:40pm  Total Treatment Time (min): 70  Visit #:  11     Treatment Area: Left knee pain [M25.562]    SUBJECTIVE  Pain Level (0-10 scale): 0  Any medication changes, allergies to medications, adverse drug reactions, diagnosis change, or new procedure performed?: [x] No    [] Yes (see summary sheet for update)  Subjective functional status/changes:   [] No changes reported  The patient reports that the only thing that will irritate her is her lateral hamstring when she straightens it all the way. She's walking 1.5 miles every day, and only feels a little stiff afterwards. Stairs are doing better, but it's more difficult to go down than to go up. She is returning to work on 20.     OBJECTIVE    Modality rationale: decrease inflammation and decrease pain to improve the patients ability to perform ADLs and reduce pain levels   Min Type Additional Details       [] Estim: []Att   []Unatt    []TENS instruct                  []IFC  []Premod   []NMES                     []Other:  []w/US   []w/ice   []w/heat  Position:  Location:       []  Traction: [] Cervical       []Lumbar                       [] Prone          []Supine                       []Intermittent   []Continuous Lbs:  [] before manual  [] after manual  []w/heat    []  Ultrasound: []Continuous   [] Pulsed                       at: []1MHz   []3MHz Location:  W/cm2:    [] Paraffin         Location:   []w/heat    []  Ice     []  Heat  []  Ice massage Position:  Location:    []  Laser  []  Other: Position:  Location:     15 [x]  Vasopneumatic Device Pressure:       [] lo [x] med [] hi   Temperature: 34     [x] Skin assessment post-treatment:  [x]intact []redness- no adverse reaction    []redness  adverse reaction:     55 min Therapeutic Exercise:  [x] See flow sheet :   Rationale: increase ROM and increase strength to improve the patients ability to perform ADLs and reduce pain levels          With   [x] TE   [] TA   [] neuro   [] other: Patient Education: [x] Review HEP    [x] Progressed/Changed HEP based on:   [x] positioning   [x] body mechanics   [] transfers   [] heat/ice application    [] other:      Other Objective/Functional Measures: FOTO= 58 (47 at eval)     Pain Level (0-10 scale) post treatment: 0    ASSESSMENT/Changes in Function:   The patient is progressing well with full A/PROM, but continues to have noticeable strength deficits when performing unilateral exercises. She is returning to work on 4/8/20 and will be seen 1x/wk to monitor this transition and progress strengthening as tolerated. Patient will continue to benefit from skilled PT services to modify and progress therapeutic interventions, address functional mobility deficits, address ROM deficits, address strength deficits, analyze and address soft tissue restrictions, analyze and cue movement patterns, analyze and modify body mechanics/ergonomics and assess and modify postural abnormalities to attain remaining goals. [x]  See Plan of Care  [x]  See progress note/recertification  []  See Discharge Summary         Progress towards goals / Updated goals:  Patient is progressing towards goals.      PLAN  [x]  Upgrade activities as tolerated     [x]  Continue plan of care  [x]  Update interventions per flow sheet       []  Discharge due to:_  []  Other:_      Arabella Nicole, PT 3/31/2020

## 2020-04-02 ENCOUNTER — PATIENT OUTREACH (OUTPATIENT)
Dept: OTHER | Age: 60
End: 2020-04-02

## 2020-04-02 NOTE — PROGRESS NOTES
Verified  and address for HIPAA security. Introduced [employer] for patient. Patient does not identify any Care Management needs at this time and declines services. Patient will be going back to work next week.

## 2020-04-06 DIAGNOSIS — M15.9 GENERALIZED OSTEOARTHRITIS: ICD-10-CM

## 2020-04-06 RX ORDER — OMEPRAZOLE 40 MG/1
40 CAPSULE, DELAYED RELEASE ORAL DAILY
Qty: 90 CAP | Refills: 1 | Status: SHIPPED | OUTPATIENT
Start: 2020-04-06 | End: 2021-07-23 | Stop reason: SDUPTHER

## 2020-04-07 ENCOUNTER — HOSPITAL ENCOUNTER (OUTPATIENT)
Dept: PHYSICAL THERAPY | Age: 60
Discharge: HOME | End: 2020-04-07
Payer: COMMERCIAL

## 2020-04-07 PROCEDURE — 97016 VASOPNEUMATIC DEVICE THERAPY: CPT | Performed by: PHYSICAL THERAPIST

## 2020-04-07 PROCEDURE — 97110 THERAPEUTIC EXERCISES: CPT | Performed by: PHYSICAL THERAPIST

## 2020-04-15 ENCOUNTER — HOSPITAL ENCOUNTER (OUTPATIENT)
Dept: PHYSICAL THERAPY | Age: 60
Discharge: HOME | End: 2020-04-15
Payer: COMMERCIAL

## 2020-04-15 PROCEDURE — 97110 THERAPEUTIC EXERCISES: CPT | Performed by: PHYSICAL THERAPIST

## 2020-04-20 DIAGNOSIS — N95.1 MENOPAUSAL SYNDROME (HOT FLASHES): ICD-10-CM

## 2020-04-20 DIAGNOSIS — I10 ESSENTIAL HYPERTENSION WITH GOAL BLOOD PRESSURE LESS THAN 140/90: ICD-10-CM

## 2020-04-20 DIAGNOSIS — M15.9 GENERALIZED OSTEOARTHRITIS: ICD-10-CM

## 2020-04-20 RX ORDER — CELECOXIB 200 MG/1
CAPSULE ORAL
Qty: 90 CAP | Refills: 1 | Status: SHIPPED | OUTPATIENT
Start: 2020-04-20 | End: 2021-01-10

## 2020-04-20 RX ORDER — LISINOPRIL 20 MG/1
TABLET ORAL
Qty: 90 TAB | Refills: 1 | Status: SHIPPED | OUTPATIENT
Start: 2020-04-20 | End: 2021-01-10

## 2020-04-20 RX ORDER — PAROXETINE HYDROCHLORIDE 40 MG/1
TABLET, FILM COATED ORAL
Qty: 90 TAB | Refills: 1 | Status: SHIPPED | OUTPATIENT
Start: 2020-04-20 | End: 2021-01-10

## 2020-04-20 NOTE — TELEPHONE ENCOUNTER
Patient is requesting a 90 day supply of the following medication(s) to be sent to [facility]. [address] [facility] is now the patient's preferred pharmacy for all maintenance medications due to cost effectiveness.

## 2020-04-22 ENCOUNTER — HOSPITAL ENCOUNTER (OUTPATIENT)
Dept: PHYSICAL THERAPY | Age: 60
Discharge: HOME | End: 2020-04-22
Payer: COMMERCIAL

## 2020-04-22 PROCEDURE — 97110 THERAPEUTIC EXERCISES: CPT | Performed by: PHYSICAL THERAPIST

## 2020-04-22 NOTE — PROGRESS NOTES
PT DAILY TREATMENT NOTE 2-15    Patient Name: Dejan Cates  Date:2020  : 1960  [x]  Patient  Verified  Payor: [id number] / Plan: [address] / Product Type: Commerical /    In time:312  Out time:400  Total Treatment Time (min): 48  Visit #:  14    Treatment Area: Left knee pain [M25.562]    Dejan Ctaes was informed of the inherent limitations of a virtual visit,  and has consented to a virtual therapy visit on 2020. Information regarding emergency contact information for this patient during this visit is to contact:  Nela Ariza at [phone] in addition to calling [phone]. The patient was informed that at any time during the virtual visit, they can decide to stop the virtual visit. The patient verified that they are physically located in the [facility] for this virtual visit. SUBJECTIVE  Pain Level (0-10 scale): 0  Any medication changes, allergies to medications, adverse drug reactions, diagnosis change, or new procedure performed?: [x] No    [] Yes (see summary sheet for update)  Subjective functional status/changes:   [] No changes reported  Pt is without new reports    OBJECTIVE      48 min Therapeutic Exercise:  [x] See flow sheet :   Rationale: increase ROM, increase strength, improve coordination, improve balance and increase proprioception to improve the patients ability to complete all activity    Pain Level (0-10 scale) post treatment: 0    ASSESSMENT/Changes in Function:   Pt is progressing nicely and was able to easily transition to telehealth visits. She was engaged and open to modifying there-ex to complete at home. She will continue to progress in her strength and need to work on eccentric strengthening moving forward.     Patient will continue to benefit from skilled PT services to modify and progress therapeutic interventions, address functional mobility deficits, address ROM deficits, address strength deficits, analyze and address soft tissue restrictions, analyze and cue movement patterns, analyze and modify body mechanics/ergonomics, assess and modify postural abnormalities and address imbalance/dizziness to attain remaining goals. []  See Plan of Care  []  See progress note/recertification  []  See Discharge Summary           PLAN  [x]  Upgrade activities as tolerated     [x]  Continue plan of care  [x]  Update interventions per flow sheet       []  Discharge due to:_  []  Other:_        Ross Bryan is a 62 y.o. female being evaluated by a Virtual Visit (video visit) encounter to address concerns as mentioned above. A caregiver was present when appropriate. Due to this being a TeleHealth encounter (During [facility] public health emergency), evaluation of the following areas was limited: Direct tissue palpation, direct goniometric measurements, blood pressure, O2 saturation. Pursuant to the emergency declaration under the [address] [place] [address] authority and the [employer] and [employer] Act, this Virtual Visit was conducted with patient's (and/or legal guardian's) consent, to reduce the risk of exposure to COVID-19 and provide necessary medical care. Services were provided through a video synchronous discussion virtually to substitute for in-person encounter. --Letha Hernandez PT on 4/22/2020 at 3:20 PM    An electronic signature was used to authenticate this note.

## 2020-04-22 NOTE — PROGRESS NOTES
PT DAILY TREATMENT NOTE 2-15    Patient Name: Kristyn Matter  Date:4/15/20  : 1960  [x]  Patient  Verified  Payor: [employer] [place] / Plan: [facility] MEDICAL [place] [address] / Product Type: Commerical /    In time:300  Out time:407  Total Treatment Time (min): 79  Visit #:  13    Treatment Area: Left knee pain [M25.562]    SUBJECTIVE  Pain Level (0-10 scale): 0  Any medication changes, allergies to medications, adverse drug reactions, diagnosis change, or new procedure performed?: [x] No    [] Yes (see summary sheet for update)  Subjective functional status/changes:   [x] No changes reported  Pt reports that she is feeling good and will return to work tomorrow    OBJECTIVE    Modality rationale: decrease inflammation and decrease pain to improve the patients ability to complete all activity   Min Type Additional Details                                    [x]  Vasopneumatic Device Pressure:       [] lo [x] med [] hi   Temperature: 34     [x] Skin assessment post-treatment:  [x]intact [x]redness- no adverse reaction    []redness  adverse reaction:     67 min Therapeutic Exercise:  [x] See flow sheet :   Rationale: increase ROM, increase strength, improve coordination, improve balance and increase proprioception to improve the patients ability to complete all activity    Pain Level (0-10 scale) post treatment: 0    ASSESSMENT/Changes in Function:   Pt has progressed well and will be transitioned to telehealth at this time. She is comfortable with her current exercises but will require continued progression to further strengthen. With her being back at work now it will be easier for her to attend telehealth visits as well.   Will plan to contiue 1x/wk  Patient will continue to benefit from skilled PT services to modify and progress therapeutic interventions, address functional mobility deficits, address ROM deficits, address strength deficits, analyze and address soft tissue restrictions, analyze and cue movement patterns, analyze and modify body mechanics/ergonomics, assess and modify postural abnormalities and address imbalance/dizziness to attain remaining goals.      []  See Plan of Care  []  See progress note/recertification  []  See Discharge Summary         PLAN  [x]  Upgrade activities as tolerated     [x]  Continue plan of care  [x]  Update interventions per flow sheet       []  Discharge due to:_  []  Other:_      Deisi Manrique, PT 4/15/20

## 2020-04-22 NOTE — PROGRESS NOTES
PT DAILY TREATMENT NOTE 2-15    Patient Name: Bethel Rolon  Date:20  : 1960  [x]  Patient  Verified  Payor: [employer] / Plan: [facility] MEDICAL [place] [address] [facility] / Product Type: Commerical /    In time:1100  Out time:1215  Total Treatment Time (min): 80  Visit #:  12    Treatment Area: Left knee pain [M25.562]    SUBJECTIVE  Pain Level (0-10 scale): 0  Any medication changes, allergies to medications, adverse drug reactions, diagnosis change, or new procedure performed?: [x] No    [] Yes (see summary sheet for update)  Subjective functional status/changes:   [x] No changes reported  Pt is without new reports. States that she will be returning to work soon    OBJECTIVE    Modality rationale: decrease inflammation and decrease pain to improve the patients ability to complete all activity   Min Type Additional Details                                 15   [x]  Vasopneumatic Device Pressure:       [] lo [x] med [] hi   Temperature: 34     [x] Skin assessment post-treatment:  [x]intact [x]redness- no adverse reaction    []redness  adverse reaction:     70 min Therapeutic Exercise:  [x] See flow sheet :   Rationale: increase ROM, increase strength, improve coordination, improve balance and increase proprioception to improve the patients ability to complete all activity    Pain Level (0-10 scale) post treatment: 0    ASSESSMENT/Changes in Function:   Pt has progressed very well through her PT and is able to complete all activity. She still requires guidance with form and muscle activation.   However her pain levels are down and she is moving through greater ROM with all activity  Patient will continue to benefit from skilled PT services to modify and progress therapeutic interventions, address functional mobility deficits, address ROM deficits, address strength deficits, analyze and address soft tissue restrictions, analyze and cue movement patterns, analyze and modify body mechanics/ergonomics, assess and modify postural abnormalities and address imbalance/dizziness to attain remaining goals.      []  See Plan of Care  []  See progress note/recertification  []  See Discharge Summary         PLAN  [x]  Upgrade activities as tolerated     [x]  Continue plan of care  [x]  Update interventions per flow sheet       []  Discharge due to:_  []  Other:_      Ana Paula Paez, PT 4/7/20

## 2020-04-23 ENCOUNTER — PATIENT MESSAGE (OUTPATIENT)
Dept: CASE MANAGEMENT | Age: 60
End: 2020-04-23

## 2020-04-27 ENCOUNTER — HOSPITAL ENCOUNTER (OUTPATIENT)
Dept: PHYSICAL THERAPY | Age: 60
Discharge: HOME | End: 2020-04-27
Payer: COMMERCIAL

## 2020-04-27 PROCEDURE — 97110 THERAPEUTIC EXERCISES: CPT | Performed by: PHYSICAL THERAPIST

## 2020-04-27 NOTE — PROGRESS NOTES
PT DAILY TREATMENT NOTE 2-15    Patient Name: Rupa Menjivar  Date:2020  : 1960  [x]  Patient  Verified  Payor: [id number] / Plan: [address] / Product Type: Commerical /    In time:315  Out time:400  Total Treatment Time (min): 45  Visit #:  15    Treatment Area: Left knee pain [M25.562]    Rupa Menjivar was informed of the inherent limitations of a virtual visit,  and has consented to a virtual therapy visit on 2020. Information regarding emergency contact information for this patient during this visit is to contact:  Vincent Rider at [phone] in addition to calling [phone]. The patient was informed that at any time during the virtual visit, they can decide to stop the virtual visit. The patient verified that they are physically located in the [facility] for this virtual visit. SUBJECTIVE  Pain Level (0-10 scale): 0  Any medication changes, allergies to medications, adverse drug reactions, diagnosis change, or new procedure performed?: [x] No    [] Yes (see summary sheet for update)  Subjective functional status/changes:   [x] No changes reported      OBJECTIVE      45 min Therapeutic Exercise:  [x] See flow sheet :   Rationale: increase ROM, increase strength, improve coordination, improve balance and increase proprioception to improve the patients ability to complete all activity    Pain Level (0-10 scale) post treatment: 0    ASSESSMENT/Changes in Function:   Pt is able to tolerate all activity and completed Her there-ex well with minimal complaints.   She has progressed very well to date and will plan for one additional telehealth visit to ensure that she will be able to continue on this routine independently  Patient will continue to benefit from skilled PT services to modify and progress therapeutic interventions, address functional mobility deficits, address ROM deficits, address strength deficits, analyze and address soft tissue restrictions, analyze and cue movement patterns, analyze and modify body mechanics/ergonomics, assess and modify postural abnormalities, address imbalance/dizziness and instruct in home and community integration to attain remaining goals. []  See Plan of Care  []  See progress note/recertification  []  See Discharge Summary         PLAN  [x]  Upgrade activities as tolerated     [x]  Continue plan of care  [x]  Update interventions per flow sheet       []  Discharge due to:_  []  Other:_        Ahmet Lea is a 62 y.o. female being evaluated by a Virtual Visit (video visit) encounter to address concerns as mentioned above. A caregiver was present when appropriate. Due to this being a TeleHealth encounter ([facility]), evaluation of the following areas was limited: Direct tissue palpation, direct goniometric measurements, blood pressure, O2 saturation. Pursuant to the emergency declaration under the [address] [place] [address] and the [employer] and [employer] Act, this Virtual Visit was conducted with patient's (and/or legal guardian's) consent, to reduce the risk of exposure to COVID-19 and provide necessary medical care. Services were provided through a video synchronous discussion virtually to substitute for in-person encounter. --Padmini Kennedy PT on 4/27/2020 at 3:18 PM    An electronic signature was used to authenticate this note.

## 2020-05-06 ENCOUNTER — HOSPITAL ENCOUNTER (OUTPATIENT)
Dept: PHYSICAL THERAPY | Age: 60
Discharge: HOME | End: 2020-05-06
Payer: COMMERCIAL

## 2020-05-06 PROCEDURE — 97110 THERAPEUTIC EXERCISES: CPT | Performed by: PHYSICAL THERAPIST

## 2020-05-08 ENCOUNTER — VIRTUAL VISIT (OUTPATIENT)
Dept: INTERNAL MEDICINE CLINIC | Age: 60
End: 2020-05-08

## 2020-05-08 VITALS
OXYGEN SATURATION: 97 % | DIASTOLIC BLOOD PRESSURE: 86 MMHG | TEMPERATURE: 98.3 F | HEART RATE: 89 BPM | SYSTOLIC BLOOD PRESSURE: 132 MMHG

## 2020-05-08 DIAGNOSIS — R73.03 PRE-DIABETES: ICD-10-CM

## 2020-05-08 DIAGNOSIS — Z12.39 BREAST CANCER SCREENING: ICD-10-CM

## 2020-05-08 DIAGNOSIS — I10 HYPERTENSION, ESSENTIAL: Primary | ICD-10-CM

## 2020-05-08 DIAGNOSIS — M85.89 OSTEOPENIA OF MULTIPLE SITES: ICD-10-CM

## 2020-05-08 DIAGNOSIS — K21.9 GASTROESOPHAGEAL REFLUX DISEASE WITHOUT ESOPHAGITIS: ICD-10-CM

## 2020-05-08 DIAGNOSIS — E66.01 MORBID OBESITY (HCC): ICD-10-CM

## 2020-05-08 DIAGNOSIS — F41.9 ANXIETY: ICD-10-CM

## 2020-05-08 PROBLEM — Z96.659 MECHANICAL LOOSENING OF PROSTHETIC KNEE (HCC): Status: RESOLVED | Noted: 2020-01-15 | Resolved: 2020-05-08

## 2020-05-08 PROBLEM — T84.038A MECHANICAL LOOSENING OF PROSTHETIC KNEE (HCC): Status: RESOLVED | Noted: 2020-01-15 | Resolved: 2020-05-08

## 2020-05-08 RX ORDER — ALPRAZOLAM 0.5 MG/1
0.5 TABLET ORAL
Qty: 10 TAB | Refills: 0 | Status: SHIPPED | OUTPATIENT
Start: 2020-05-08

## 2020-05-08 RX ORDER — ALENDRONATE SODIUM 35 MG/1
35 TABLET ORAL
Qty: 4 TAB | Refills: 0 | Status: SHIPPED | OUTPATIENT
Start: 2020-05-08 | End: 2020-06-06 | Stop reason: SDUPTHER

## 2020-05-08 NOTE — PROGRESS NOTES
Selena Bradshaw is a 62 y.o. female who was seen by synchronous (real-time) audio-video technology on 5/8/2020. Consent: Selena Bradshaw who was seen by synchronous (real-time) audio-video technology, and/or her healthcare decision maker, is aware that this patient-initiated, Telehealth encounter on 5/8/2020 is a billable service, with coverage as determined by her insurance carrier. She is aware that she may receive a bill and has provided verbal consent to proceed: Yes. Patient identification was verified prior to start of the visit. A caregiver was present when appropriate. Due to this being a TeleHealth encounter (during [address] emergency), evaluation of the following organ systems was limited: VS/Constituional/EENT/Resp/CV/GI//MS/Neuro/Skin/Heme-Lymph-Imm. Pursuant to the emergency declaration under the [facility] 1135 waiver authority and the [employer] and [employer] Act, this Virtual Visit was conducted, with patient's (and/or legal guardian's) consent, to reduce the patient's risk of exposure to COVID-19 and provide necessary medical care. Services were provided through a synchronous discussion virtually to substitute for in-person clinic visit. I was at home. The patient was in the office. Assessment & Plan:   Diagnoses and all orders for this visit:    1. Hypertension, essential- well controlled, continue current treatment pending review of labs   -     METABOLIC PANEL, COMPREHENSIVE  -     CBC W/O DIFF  -     LIPID PANEL    2. Morbid obesity ([address]- previously uncontrolled, likely about the same, I have recommended the following interventions: encourage exercise and lifestyle education regarding diet. 3. Pre-diabetes- due for labs, recommended weight loss and life style changes  -     METABOLIC PANEL, COMPREHENSIVE  -     HEMOGLOBIN A1C WITH EAG    4.  Anxiety- stable and well controlled, I reviewed treatment options with her, I reviewed life style changes to help improve mood, continue current treatment.  reviewed and meds refilled   -     ALPRAZolam (Xanax) 0.5 mg tablet; Take 1 Tab by mouth every twelve (12) hours as needed for Anxiety. 5. Osteopenia of multiple sites- never started medications last year, FRAX is not abnormal but decline in scores from '16 to '19, reviewed life style changes and repeat next year vs meds and will try meds. 1 month to local and then 3 months to mail order if tolerating. Reviewed repeat DEXA in '22.  -     alendronate (FOSAMAX) 35 mg tablet; Take 1 Tab by mouth every seven (7) days. 6. Gastroesophageal reflux disease without esophagitis- well controlled, reviewed lowering dose to 20mg, she will try OTC x 10-14 days and update me. 7. Breast cancer screening  -     [facility] 3D GABBY W MAMMO BI SCREENING INCL CAD; Future      Follow-up and Dispositions    · Return in about 1 year (around 5/8/2021) for FULL PHYSICAL - 30 minutes. Subjective:   [place] Matter was seen for Hypertension    Cardiovascular Review  The patient has hypertension and obesity. Since last visit: no changes. She reports taking medications as instructed, no medication side effects noted, home BP monitoring in range of 858'D systolic over 87'J diastolic. Diet and Lifestyle: generally follows a low fat low cholesterol diet, generally follows a low sodium diet, exercises sporadically. Labs: reviewed and discussed with patient. Endocrine Review  She is seen for pre-diabetes. Since last visit: no significant changes. Home glucose monitoring: is not performed. Diabetic diet compliance: compliant most of the time. Lab review: labs reviewed and discussed with patient. GI Review  She has a history of GERD. Since last visit: no changes. She denies: abdominal bloating, heartburn, midepigastric pain and nausea. She has identified the following triggers: nothing.   Medical therapy currently involves Prilosec. Mental Health Review  Patient is seen for anxiety. Since last visit: no changes. She reports using xanax mostly arround knee visits and surgery. Reports experiences the following side effects from the treatment: none. Last PDMP Dane Vo as Reviewed:  Review User Review Instant Review Result   ROSENDO AGUDELO 5/8/2020  4:37 PM Reviewed PDMP [1]          Prior to Admission medications    Medication Sig Start Date End Date Taking? Authorizing Provider   celecoxib (CELEBREX) 200 mg capsule TAKE 1 CAPSULE DAILY AS  NEEDED FOR PAIN 4/20/20  Yes Charli Fernández MD   lisinopriL (PRINIVIL, ZESTRIL) 20 mg tablet TAKE 1 TABLET DAILY 4/20/20  Yes Charli Fernández MD   PARoxetine (PAXIL) 40 mg tablet TAKE 1 TABLET DAILY 4/20/20  Yes Charli Fernández MD   omeprazole (PRILOSEC) 40 mg capsule Take 1 Cap by mouth daily. 4/6/20  Yes Charli Fernández MD   acetaminophen (TYLOPHEN) 500 mg capsule Take 1 Cap by mouth every four (4) hours as needed for Pain. 1/15/20  Yes Erika Palmer PA-C   MAGNESIUM PO Take  by mouth daily. Yes Provider, Historical   ALPRAZolam (XANAX) 0.5 mg tablet Take 1 Tab by mouth every twelve (12) hours as needed for Anxiety. 6/5/19  Yes Charli Fernández MD   cholecalciferol, vitamin D3, (VITAMIN D3) 2,000 unit tab Take 2,000 Units by mouth daily. Yes Provider, Historical   MULTIVITAMIN PO Take 1 Tab by mouth daily. 4/27/11  Yes Provider, Historical   aspirin delayed-release 81 mg tablet Take 1 Tab by mouth two (2) times a day. Indications: blood clot prevention 1/15/20 5/8/20  Stephanie TALAMANTES PA-C   senna-docusate (PERICOLACE) 8.6-50 mg per tablet Take 1 Tab by mouth two (2) times daily as needed for Constipation. 1/15/20 5/8/20  Stephanie TALAMANTES PA-C       No Known Allergies      Review of Systems   Constitutional: Negative for malaise/fatigue and weight loss.    Respiratory: Negative for cough and shortness of breath. Cardiovascular: Negative for chest pain, palpitations and leg swelling. Gastrointestinal: Negative for abdominal pain, constipation, diarrhea, heartburn, nausea and vomiting. Musculoskeletal: Positive for back pain, joint pain and neck pain. Negative for myalgias. Using Celebrex daily for upper back and neck pain   Skin: Negative for rash. Neurological: Negative for dizziness and headaches. Psychiatric/Behavioral: Negative for depression. The patient is not nervous/anxious and does not have insomnia. Objective:     General: alert, cooperative, no distress, obese   Mental  status: normal mood, behavior, speech, dress, motor activity, and thought processes, able to follow commands   Eyes: normal sclera   Mouth:    Neck: no visualized mass   Resp: normal effort and no respiratory distress   Neuro: no gross deficits   Musculoskeletal:    Skin: no discoloration or lesions of concern on visible areas   Psychiatric: normal affect         We discussed the expected course, resolution and complications of the diagnosis(es) in detail. Medication risks, benefits, costs, interactions, and alternatives were discussed as indicated. I advised her to contact the office if her condition worsens, changes or fails to improve as anticipated. She expressed understanding with the diagnosis(es) and plan.      Saul Nur MD

## 2020-05-13 ENCOUNTER — APPOINTMENT (OUTPATIENT)
Dept: PHYSICAL THERAPY | Age: 60
End: 2020-05-13
Payer: COMMERCIAL

## 2020-05-20 ENCOUNTER — APPOINTMENT (OUTPATIENT)
Dept: PHYSICAL THERAPY | Age: 60
End: 2020-05-20
Payer: COMMERCIAL

## 2020-06-06 DIAGNOSIS — M85.89 OSTEOPENIA OF MULTIPLE SITES: ICD-10-CM

## 2020-06-07 RX ORDER — ALENDRONATE SODIUM 35 MG/1
35 TABLET ORAL
Qty: 12 TAB | Refills: 1 | Status: SHIPPED | OUTPATIENT
Start: 2020-06-07 | End: 2020-11-22

## 2020-07-13 DIAGNOSIS — Z20.822 SUSPECTED COVID-19 VIRUS INFECTION: Primary | ICD-10-CM

## 2020-07-13 DIAGNOSIS — I10 HYPERTENSION, ESSENTIAL: Primary | ICD-10-CM

## 2020-07-14 LAB
ALBUMIN SERPL-MCNC: 4.2 G/DL (ref 3.8–4.8)
ALBUMIN/GLOB SERPL: 1.1 {RATIO} (ref 1.2–2.2)
ALP SERPL-CCNC: 146 IU/L (ref 39–117)
ALT SERPL-CCNC: 49 IU/L (ref 0–32)
AST SERPL-CCNC: 40 IU/L (ref 0–40)
BASOPHILS # BLD AUTO: 0.1 X10E3/UL (ref 0–0.2)
BASOPHILS NFR BLD AUTO: 1 %
BILIRUB SERPL-MCNC: 0.2 MG/DL (ref 0–1.2)
BUN SERPL-MCNC: 16 MG/DL (ref 8–27)
BUN/CREAT SERPL: 20 (ref 12–28)
CALCIUM SERPL-MCNC: 9.4 MG/DL (ref 8.7–10.3)
CHLORIDE SERPL-SCNC: 101 MMOL/L (ref 96–106)
CHOLEST SERPL-MCNC: 245 MG/DL (ref 100–199)
CO2 SERPL-SCNC: 20 MMOL/L (ref 20–29)
CREAT SERPL-MCNC: 0.8 MG/DL (ref 0.57–1)
EOSINOPHIL # BLD AUTO: 0.4 X10E3/UL (ref 0–0.4)
EOSINOPHIL NFR BLD AUTO: 4 %
ERYTHROCYTE [DISTWIDTH] IN BLOOD BY AUTOMATED COUNT: 13.7 % (ref 11.7–15.4)
EST. AVERAGE GLUCOSE BLD GHB EST-MCNC: 134 MG/DL
GLOBULIN SER CALC-MCNC: 4 G/DL (ref 1.5–4.5)
GLUCOSE SERPL-MCNC: 130 MG/DL (ref 65–99)
HBA1C MFR BLD: 6.3 % (ref 4.8–5.6)
HCT VFR BLD AUTO: 41.6 % (ref 34–46.6)
HDLC SERPL-MCNC: 39 MG/DL
HGB BLD-MCNC: 13.5 G/DL (ref 11.1–15.9)
IMM GRANULOCYTES # BLD AUTO: 0 X10E3/UL (ref 0–0.1)
IMM GRANULOCYTES NFR BLD AUTO: 0 %
INTERPRETATION, 910389: NORMAL
LDLC SERPL CALC-MCNC: ABNORMAL MG/DL (ref 0–99)
LYMPHOCYTES # BLD AUTO: 3 X10E3/UL (ref 0.7–3.1)
LYMPHOCYTES NFR BLD AUTO: 33 %
MCH RBC QN AUTO: 27.9 PG (ref 26.6–33)
MCHC RBC AUTO-ENTMCNC: 32.5 G/DL (ref 31.5–35.7)
MCV RBC AUTO: 86 FL (ref 79–97)
MONOCYTES # BLD AUTO: 1.1 X10E3/UL (ref 0.1–0.9)
MONOCYTES NFR BLD AUTO: 12 %
NEUTROPHILS # BLD AUTO: 4.6 X10E3/UL (ref 1.4–7)
NEUTROPHILS NFR BLD AUTO: 50 %
PDF IMAGE, 910387: NORMAL
PLATELET # BLD AUTO: 393 X10E3/UL (ref 150–450)
POTASSIUM SERPL-SCNC: 4.7 MMOL/L (ref 3.5–5.2)
PROT SERPL-MCNC: 8.2 G/DL (ref 6–8.5)
RBC # BLD AUTO: 4.84 X10E6/UL (ref 3.77–5.28)
SODIUM SERPL-SCNC: 137 MMOL/L (ref 134–144)
TRIGL SERPL-MCNC: 427 MG/DL (ref 0–149)
VLDLC SERPL CALC-MCNC: ABNORMAL MG/DL (ref 5–40)
WBC # BLD AUTO: 9.2 X10E3/UL (ref 3.4–10.8)

## 2020-07-15 DIAGNOSIS — E78.2 ELEVATED TRIGLYCERIDES WITH HIGH CHOLESTEROL: Primary | ICD-10-CM

## 2020-07-16 NOTE — PROGRESS NOTES
Results released to patient via [employer]. All labs are stable or at goal for her, except for elevated LFT's (stable) and elevated TG (significantly worse then last year, will defer medications but will repeat in 3 months).

## 2020-07-17 LAB
SARS-COV-2, NAA: NOT DETECTED
SPECIMEN STATUS REPORT, ROLRST: NORMAL

## 2020-08-26 DIAGNOSIS — E78.2 ELEVATED TRIGLYCERIDES WITH HIGH CHOLESTEROL: ICD-10-CM

## 2020-09-14 ENCOUNTER — EMPLOYEE WELLNESS (OUTPATIENT)
Dept: OTHER | Facility: CLINIC | Age: 60
End: 2020-09-14

## 2020-09-14 LAB
CHOLEST SERPL-MCNC: 210 MG/DL
GLUCOSE SERPL-MCNC: 94 MG/DL (ref 65–100)
HDLC SERPL-MCNC: 41 MG/DL
LDLC SERPL CALC-MCNC: 117.8 MG/DL (ref 0–100)
TRIGL SERPL-MCNC: 256 MG/DL (ref ?–150)

## 2020-11-19 ENCOUNTER — HOSPITAL ENCOUNTER (OUTPATIENT)
Dept: MAMMOGRAPHY | Age: 60
Discharge: HOME | End: 2020-11-19
Attending: INTERNAL MEDICINE
Payer: COMMERCIAL

## 2020-11-19 DIAGNOSIS — Z12.39 BREAST CANCER SCREENING: ICD-10-CM

## 2020-11-19 PROCEDURE — 77063 BREAST TOMOSYNTHESIS BI: CPT

## 2020-11-22 DIAGNOSIS — M85.89 OSTEOPENIA OF MULTIPLE SITES: ICD-10-CM

## 2020-11-22 RX ORDER — ALENDRONATE SODIUM 35 MG/1
TABLET ORAL
Qty: 12 TAB | Refills: 1 | Status: SHIPPED | OUTPATIENT
Start: 2020-11-22 | End: 2021-05-09

## 2021-01-10 DIAGNOSIS — I10 ESSENTIAL HYPERTENSION WITH GOAL BLOOD PRESSURE LESS THAN 140/90: ICD-10-CM

## 2021-01-10 DIAGNOSIS — M15.9 GENERALIZED OSTEOARTHRITIS: ICD-10-CM

## 2021-01-10 DIAGNOSIS — N95.1 MENOPAUSAL SYNDROME (HOT FLASHES): ICD-10-CM

## 2021-01-10 RX ORDER — PAROXETINE HYDROCHLORIDE 40 MG/1
TABLET, FILM COATED ORAL
Qty: 90 TAB | Refills: 0 | Status: SHIPPED | OUTPATIENT
Start: 2021-01-10 | End: 2021-01-18 | Stop reason: SDUPTHER

## 2021-01-10 RX ORDER — CELECOXIB 200 MG/1
CAPSULE ORAL
Qty: 90 CAP | Refills: 0 | Status: SHIPPED | OUTPATIENT
Start: 2021-01-10 | End: 2021-04-18

## 2021-01-10 RX ORDER — LISINOPRIL 20 MG/1
TABLET ORAL
Qty: 90 TAB | Refills: 0 | Status: SHIPPED | OUTPATIENT
Start: 2021-01-10 | End: 2021-01-18

## 2021-01-18 DIAGNOSIS — N95.1 MENOPAUSAL SYNDROME (HOT FLASHES): ICD-10-CM

## 2021-01-18 DIAGNOSIS — I10 ESSENTIAL HYPERTENSION WITH GOAL BLOOD PRESSURE LESS THAN 140/90: ICD-10-CM

## 2021-01-18 RX ORDER — LISINOPRIL 20 MG/1
TABLET ORAL
Qty: 10 TAB | Refills: 0 | Status: SHIPPED | OUTPATIENT
Start: 2021-01-18 | End: 2021-04-18

## 2021-01-18 RX ORDER — PAROXETINE HYDROCHLORIDE 40 MG/1
TABLET, FILM COATED ORAL
Qty: 10 TAB | Refills: 0 | Status: SHIPPED | OUTPATIENT
Start: 2021-01-18 | End: 2021-04-18

## 2021-01-18 NOTE — TELEPHONE ENCOUNTER
Patient had requested short prescription Paxil and Lisinopril to [facility]. Only Paxil sent. Lisinopril sent per Ashton Callow Dr. María Raphael.

## 2021-02-08 NOTE — PROGRESS NOTES
PT DAILY TREATMENT NOTE 2-15    Patient Name: Bethel Rolon  Date:2021  : 1960  [x]  Patient  Verified  Payor: [employer] / Plan: [address] [place] [address] / Product Type: Commerical /    In time:303  Out time:338  Total Treatment Time (min): 35  Visit #:  16    Treatment Area: Left knee pain [M25.562]    Bethel Rolon was informed of the inherent limitations of a virtual visit,  and has consented to a virtual therapy visit on 2021. Information regarding emergency contact information for this patient during this visit is to contact:  Vincent Rider at [phone] in addition to calling [phone]. The patient was informed that at any time during the virtual visit, they can decide to stop the virtual visit. The patient verified that they are physically located in the [facility] for this virtual visit. SUBJECTIVE  Pain Level (0-10 scale): 0  Any medication changes, allergies to medications, adverse drug reactions, diagnosis change, or new procedure performed?: [x] No    [] Yes (see summary sheet for update)  Subjective functional status/changes:   [x] No changes reported      OBJECTIVE      35 min Therapeutic Exercise:  [x] See flow sheet :   Rationale: increase ROM, increase strength, improve coordination, improve balance and increase proprioception to improve the patients ability to complete all activity    Pain Level (0-10 scale) post treatment: 0    ASSESSMENT/Changes in Function:   Pt has completed her therapy and has shown independence with her HEP.   She has reached her goals and will be DC'd from skilled care at this time  Patient will continue to benefit from skilled PT services to modify and progress therapeutic interventions, address functional mobility deficits, address ROM deficits, address strength deficits, analyze and address soft tissue restrictions, analyze and cue movement patterns, analyze and modify body mechanics/ergonomics, assess and modify postural abnormalities, address imbalance/dizziness and instruct in home and community integration to attain remaining goals. []  See Plan of Care  []  See progress note/recertification  [x]  See Discharge Summary         PLAN  []  Upgrade activities as tolerated     []  Continue plan of care  []  Update interventions per flow sheet       []  Discharge due to:meeting goals  []  Other:_        Bernabe Castañeda is a 56 y.o. female being evaluated by a Virtual Visit (video visit) encounter to address concerns as mentioned above. A caregiver was present when appropriate. Due to this being a TeleHealth encounter (During [facility]-54 public health emergency), evaluation of the following areas was limited: Direct tissue palpation, direct goniometric measurements, blood pressure, O2 saturation. Pursuant to the emergency declaration under the [address] [place] [address], [address] [place] [address] authority and the [employer] and [employer] Act, this Virtual Visit was conducted with patient's (and/or legal guardian's) consent, to reduce the risk of exposure to COVID-19 and provide necessary medical care. Services were provided through a video synchronous discussion virtually to substitute for in-person encounter. --Donna Cox, PT on 5/6/2021 at 3:18 PM    An electronic signature was used to authenticate this note.

## 2021-02-08 NOTE — ANCILLARY DISCHARGE INSTRUCTIONS
[address] Physical Therapy  [address] [facility] (MOB IV), [address]  Phone: [phone] Fax: [phone]    Discharge Summary  2-15    Patient name: Manjeet Melgoza  : 1960  Provider#: [id number]  Referral source: Isaiah Brittle, MD      Medical/Treatment Diagnosis: Left knee pain [M25.562]     Prior Hospitalization: see medical history     Comorbidities: See Plan of Care  Prior Level of Function:See Plan of Care  Medications: Verified on Patient Summary List    Start of Care: 20      Onset Date:1/15/20   Visits from Start of Care: 16     Missed Visits: 1  Reporting Period : 20 to 20      ASSESSMENT/SUMMARY OF CARE: Pt completed 16 visits of therapy that focused on restoring ROM and strength. She progressed well and completed portions of her treatment via telehealth. She demonstrates no limitations at this time and states that she is able to complete all activity without increased symptoms. She will be DC'd from skilled care      Goal:  Short Term Goals: To be accomplished in 2 treatments:                         2.) The patient will be independent with their HEP consistently for at least one week. - MET  Long Term Goals: To be accomplished in 16 treatments:                         2.) The patient will have at most 2/10 pain with daily activities. -  MET                         2.) The patient will improve their PROM flexion to at least 130 degrees to show improvement in functional mobility. - MET (135 today)                         3.) The patient will improve their FOTO score from 47 to at least 53 to show improvements in functional mobility.- MET (58 today)                          New Goal:                          4.) The patient will be able to negotiate at least 3 steps with step over step pattern with minimal to no pain. -MET                          5.) The patient will be able to work a full day without a significant flare up or less than 3/10 pain.  MET        RECOMMENDATIONS:  [x]Discontinue therapy: [x]Patient has reached or is progressing toward set goals      []Patient is non-compliant or has abdicated      []Due to lack of appreciable progress towards set goals      []Other    Justin Ramirez, PT 5/6/20

## 2021-04-02 ENCOUNTER — OFFICE VISIT (OUTPATIENT)
Dept: PRIMARY CARE CLINIC | Age: 61
End: 2021-04-02
Payer: COMMERCIAL

## 2021-04-02 VITALS
HEIGHT: 68 IN | SYSTOLIC BLOOD PRESSURE: 124 MMHG | WEIGHT: 243.8 LBS | BODY MASS INDEX: 36.95 KG/M2 | TEMPERATURE: 97.8 F | OXYGEN SATURATION: 97 % | HEART RATE: 77 BPM | RESPIRATION RATE: 16 BRPM | DIASTOLIC BLOOD PRESSURE: 86 MMHG

## 2021-04-02 DIAGNOSIS — M54.41 ACUTE RIGHT-SIDED LOW BACK PAIN WITH BILATERAL SCIATICA: Primary | ICD-10-CM

## 2021-04-02 DIAGNOSIS — M54.42 ACUTE RIGHT-SIDED LOW BACK PAIN WITH BILATERAL SCIATICA: Primary | ICD-10-CM

## 2021-04-02 LAB
BILIRUB UR QL STRIP: NEGATIVE
GLUCOSE UR-MCNC: NEGATIVE MG/DL
KETONES P FAST UR STRIP-MCNC: NEGATIVE MG/DL
PH UR STRIP: 6 [PH] (ref 4.6–8)
PROT UR QL STRIP: NEGATIVE
SP GR UR STRIP: 1.02 (ref 1–1.03)
UA UROBILINOGEN AMB POC: NORMAL (ref 0.2–1)
URINALYSIS CLARITY POC: CLEAR
URINALYSIS COLOR POC: YELLOW
URINE BLOOD POC: NEGATIVE
URINE LEUKOCYTES POC: NORMAL
URINE NITRITES POC: NEGATIVE

## 2021-04-02 PROCEDURE — 99213 OFFICE O/P EST LOW 20 MIN: CPT | Performed by: NURSE PRACTITIONER

## 2021-04-02 PROCEDURE — 81003 URINALYSIS AUTO W/O SCOPE: CPT | Performed by: NURSE PRACTITIONER

## 2021-04-02 RX ORDER — PREDNISONE 10 MG/1
10 TABLET ORAL SEE ADMIN INSTRUCTIONS
Qty: 21 TAB | Refills: 0 | Status: SHIPPED | OUTPATIENT
Start: 2021-04-02 | End: 2021-04-12 | Stop reason: ALTCHOICE

## 2021-04-02 RX ORDER — PAROXETINE HYDROCHLORIDE 20 MG/1
TABLET, FILM COATED ORAL
COMMUNITY
Start: 2021-01-18 | End: 2021-04-02 | Stop reason: ALTCHOICE

## 2021-04-02 RX ORDER — TRAMADOL HYDROCHLORIDE 50 MG/1
50 TABLET ORAL
Qty: 5 TAB | Refills: 0 | Status: SHIPPED | OUTPATIENT
Start: 2021-04-02 | End: 2021-04-05

## 2021-04-02 RX ORDER — PAROXETINE HYDROCHLORIDE 20 MG/1
TABLET, FILM COATED ORAL
COMMUNITY
Start: 2021-01-18 | End: 2021-04-02

## 2021-04-02 NOTE — PROGRESS NOTES
Alyssa Del Castillo is a 56 y.o. female    Room:5    Chief Complaint   Patient presents with    Back Pain     Pt states \" started x1 week ago, not sure what cause the Back pain, have taken tylenol and didnt do to much of anything \". Visit Vitals  /86 (BP 1 Location: Left arm, BP Patient Position: Sitting, BP Cuff Size: Adult)   Pulse 77   Temp 97.8 °F (36.6 °C) (Oral)   Resp 16   Ht 5' 7.5\" (1.715 m)   Wt 243 lb 12.8 oz (110.6 kg)   SpO2 97%   BMI 37.62 kg/m²       Pain Scale: 9/10    1. Have you been to the ER, urgent care clinic since your last visit? Hospitalized since your last visit? NO    2. Have you seen or consulted any other health care providers outside of the [address] [place] [address] since your last visit? Include any pap smears or colon screening.    NO

## 2021-04-02 NOTE — PATIENT INSTRUCTIONS
Back Pain: Care Instructions Your Care Instructions Back pain has many possible causes. It is often related to problems with muscles and ligaments of the back. It may also be related to problems with the nerves, discs, or bones of the back. Moving, lifting, standing, sitting, or sleeping in an awkward way can strain the back. Sometimes you don't notice the injury until later. Arthritis is another common cause of back pain. Although it may hurt a lot, back pain usually improves on its own within several weeks. Most people recover in 12 weeks or less. Using good home treatment and being careful not to stress your back can help you feel better sooner. Follow-up care is a key part of your treatment and safety. Be sure to make and go to all appointments, and call your doctor if you are having problems. It's also a good idea to know your test results and keep a list of the medicines you take. How can you care for yourself at home? · Sit or lie in positions that are most comfortable and reduce your pain. Try one of these positions when you lie down: ? Lie on your back with your knees bent and supported by large pillows. ? Lie on the floor with your legs on the seat of a sofa or chair. ? Lie on your side with your knees and hips bent and a pillow between your legs. ? Lie on your stomach if it does not make pain worse. · Do not sit up in bed, and avoid soft couches and twisted positions. Bed rest can help relieve pain at first, but it delays healing. Avoid bed rest after the first day of back pain. · Change positions every 30 minutes. If you must sit for long periods of time, take breaks from sitting. Get up and walk around, or lie in a comfortable position. · Try using a heating pad on a low or medium setting for 15 to 20 minutes every 2 or 3 hours. Try a warm shower in place of one session with the heating pad. · You can also try an ice pack for 10 to 15 minutes every 2 to 3 hours.  Put a thin cloth between the ice pack and your skin. · Take pain medicines exactly as directed. ? If the doctor gave you a prescription medicine for pain, take it as prescribed. ? If you are not taking a prescription pain medicine, ask your doctor if you can take an over-the-counter medicine. · Take short walks several times a day. You can start with 5 to 10 minutes, 3 or 4 times a day, and work up to longer walks. Walk on level surfaces and avoid hills and stairs until your back is better. · Return to work and other activities as soon as you can. Continued rest without activity is usually not good for your back. · To prevent future back pain, do exercises to stretch and strengthen your back and stomach. Learn how to use good posture, safe lifting techniques, and proper body mechanics. When should you call for help? Call your doctor now or seek immediate medical care if: 
  · You have new or worsening numbness in your legs.  
  · You have new or worsening weakness in your legs. (This could make it hard to stand up.)  
  · You lose control of your bladder or bowels. Watch closely for changes in your health, and be sure to contact your doctor if: 
  · You have a fever, lose weight, or don't feel well.  
  · You do not get better as expected. Where can you learn more? Go to http://www.gray.com/ Enter G244 in the search box to learn more about \"Back Pain: Care Instructions. \" Current as of: November 16, 2020               Content Version: 12.8 © 3322-3149 [employer]. Care instructions adapted under license by [employer] (which disclaims liability or warranty for this information). If you have questions about a medical condition or this instruction, always ask your healthcare professional. [place] [address] any warranty or liability for your use of this information.

## 2021-04-02 NOTE — PROGRESS NOTES
Subjective:     Davidson Peguero is a 56 y.o. female who complains of low back pain for 4-5 days, positional with bending or lifting, without radiation down the legs. She c/o sharp pain with walking. Precipitating factors: none recalled by the patient. Prior history of back problems: no prior back problems. There is not numbness in the legs. Symptoms are worst: today. Alleviating factors identifiable by patient are sitting. Exacerbating factors identifiable by patient are walking, bending forwards, bending backwards, bending sideways. Tried Tylenol without relief. Taking Celebrex daily. Past Medical History:   Diagnosis Date    Anxiety     Cervical polyp 2014    BENIGN ENDOCERVICAL POLYP    Colon polyps     GERD (gastroesophageal reflux disease)     Hypertension     LGSIL (low grade squamous intraepithelial lesion) on Pap smear 2006    s/p colposcopy, PAP nl since then    Menopausal syndrome (hot flashes) 9/9/2013    Obesity     Osteoarthritis     Osteopenia 9/27/2016    Primary osteoarthritis of knees, bilateral 4/13/2016    Restless leg syndrome 9/9/2013     Past Surgical History:   Procedure Laterality Date    HX BUNIONECTOMY Left 2008    HX COLONOSCOPY  2009    GI- Dr Senthil Machado    HX COLONOSCOPY  7/30/12    polyp, irregularities, repeat 3 yrs    HX COLONOSCOPY  09/24/2015    normal - next 2020    HX COLPOSCOPY  2006    HX HEENT  2011    DENTAL IMPLANT    HX KNEE ARTHROSCOPY Left 2013    (torn meniscus)    HX KNEE ARTHROSCOPY Right 3/14    HX KNEE REPLACEMENT Bilateral 04/13/2016    HX KNEE REPLACEMENT Left 01/15/2020    revision    HX ORTHOPAEDIC Left 2015    BUNIONECTOMY LEFT FOOT    HX OTHER SURGICAL Left 5/8/15    LEFT FOOT LAPIDUS ARTHRODESIS, LEFT 2ND, 3RD MET CUNEIFORM FUSION, GASTROCNEMIS RECESSION, STJ ARTHROERESIS (GENERAL WITH POPLITEAL BLOCK)     No Known Allergies      Review of Systems  Pertinent items are noted in HPI.     Objective:     Visit Vitals  /86 (BP 1 Location: Left arm, BP Patient Position: Sitting, BP Cuff Size: Adult)   Pulse 77   Temp 97.8 °F (36.6 °C) (Oral)   Resp 16   Ht 5' 7.5\" (1.715 m)   Wt 243 lb 12.8 oz (110.6 kg)   LMP 11/25/2012   SpO2 97%   BMI 37.62 kg/m²      Patient appears to be in mild to moderate pain, antalgic gait noted. Lumbosacral spine area reveals no local tenderness or mass. Painful and reduced LS ROM noted. Straight leg raise is negative at 70 degrees on bilateral. DTR's, motor strength 5/5 and sensation normal, including heel and toe gait. Peripheral pulses are palpable. Assessment/Plan:       ICD-10-CM ICD-9-CM    1. Acute right-sided low back pain with bilateral sciatica  M54.42 724.2 AMB POC URINALYSIS DIP STICK AUTO W/O MICRO    M54.41 724.3 traMADoL (ULTRAM) 50 mg tablet     Orders Placed This Encounter    AMB POC URINALYSIS DIP STICK AUTO W/O MICRO    DISCONTD: PARoxetine (PAXIL) 20 mg tablet    DISCONTD: PARoxetine (PAXIL) 20 mg tablet    predniSONE (STERAPRED DS) 10 mg dose pack    traMADoL (ULTRAM) 50 mg tablet       For acute pain, rest, intermittent application of heat (do not sleep on heating pad), steroids, and analgesics  are recommended. Discussed longer term treatment plan of prn Tylenol. Proper lifting with avoidance of heavy lifting discussed. Consider Physical Therapy and XRay studies if not improving. Call or return to clinic prn if these symptoms worsen or fail to improve as anticipated.     Siddhartha Maldonado NP

## 2021-04-05 ENCOUNTER — TRANSCRIBE ORDER (OUTPATIENT)
Dept: SCHEDULING | Age: 61
End: 2021-04-05

## 2021-04-05 ENCOUNTER — HOSPITAL ENCOUNTER (OUTPATIENT)
Dept: ULTRASOUND IMAGING | Age: 61
Discharge: HOME | End: 2021-04-05
Attending: ORTHOPAEDIC SURGERY
Payer: COMMERCIAL

## 2021-04-05 DIAGNOSIS — M79.662 PAIN OF LEFT CALF: ICD-10-CM

## 2021-04-05 DIAGNOSIS — M79.662 PAIN OF LEFT CALF: Primary | ICD-10-CM

## 2021-04-05 PROCEDURE — 93971 EXTREMITY STUDY: CPT

## 2021-04-12 ENCOUNTER — OFFICE VISIT (OUTPATIENT)
Dept: INTERNAL MEDICINE CLINIC | Age: 61
End: 2021-04-12
Payer: COMMERCIAL

## 2021-04-12 VITALS
OXYGEN SATURATION: 96 % | HEART RATE: 92 BPM | TEMPERATURE: 97.5 F | RESPIRATION RATE: 20 BRPM | BODY MASS INDEX: 36.1 KG/M2 | WEIGHT: 238.2 LBS | SYSTOLIC BLOOD PRESSURE: 120 MMHG | DIASTOLIC BLOOD PRESSURE: 80 MMHG | HEIGHT: 68 IN

## 2021-04-12 DIAGNOSIS — E66.01 MORBID OBESITY (HCC): ICD-10-CM

## 2021-04-12 DIAGNOSIS — M79.89 LEFT LEG SWELLING: Primary | ICD-10-CM

## 2021-04-12 PROCEDURE — 99213 OFFICE O/P EST LOW 20 MIN: CPT | Performed by: INTERNAL MEDICINE

## 2021-04-12 NOTE — PROGRESS NOTES
HISTORY OF PRESENT ILLNESS  Oralia Garcia is a 56 y.o. female. HPI  Patient presents for follow up left calf pain and swelling x about 10 days. She initially felt pain and a  lump in her left calf, and  lower back pain. Symptoms began after starting water aerobics. She denies other known injury. She was evaluated at  [facility] on 4/2/21 for her back pain and treated with Tylenol, Prednisone,  Tramadol and she continued her daily Celebrex. Saw subsequently saw her orthopedist, Dr. Ant Robbins,  for her swollen, painful left calf. A lower extremity Doppler study on 4/5/21 showed:    Complex fluid collections at [facility] (left upper calf). Fluid collections: 7.8x3. 2x1.7cm (largest) & 5.6x1. 7x1.2cm     The patient has developed bruising in the anterior and posterior left lower leg. She notes that the calf is a little less firm than it had been and her leg discomfort has improved over the past week. She denies numbness, weakness or paresthesias. Back pain is also improved. ROS  Per [facility]  Physical Exam  Patient is in no apparent distress   Visit Vitals  /80 (BP 1 Location: Left upper arm, BP Patient Position: Sitting, BP Cuff Size: Large adult)   Pulse 92   Temp 97.5 °F (36.4 °C) (Temporal)   Resp 20   Ht 5' 7.5\" (1.715 m)   Wt 238 lb 3.2 oz (108 kg)   LMP 11/25/2012   SpO2 96%   BMI 36.76 kg/m²     Heart[de-identified] normal rate, regular rhythm, normal S1, S2, no murmurs, rubs, clicks or gallops. Chest: clear to auscultation, no wheezes, rales or rhonchi,   Extremities: left lower leg with ecchymoses anteriorly and posteriorly, some of these areas are fading. Calf is enlarged compared to right, firm and tender to palpation. Neuro: sensation intact. No motor deficits  ASSESSMENT and PLAN  Fluid collection left calf   Patient most likely had a ruptured Bakers cyst, though bruising is a bit unusual. ? Fluid collections have hematoma component. Patient is symptomatically improving.    Will continue to monitor for now and have her follow up in about 2 weeks. If continued symptoms would consider MRI with contrast of her left leg to evaluate area further.

## 2021-04-12 NOTE — PATIENT INSTRUCTIONS
Continue to elevate when at rest 
Tylenol as directed Heat/or Ice 2-3x/day Please schedule follow up in about 2 weeks Monitor and call or return to clinic prn if these symptoms worsen or fail to improve as anticipated.

## 2021-04-18 DIAGNOSIS — M15.9 GENERALIZED OSTEOARTHRITIS: ICD-10-CM

## 2021-04-18 DIAGNOSIS — N95.1 MENOPAUSAL SYNDROME (HOT FLASHES): ICD-10-CM

## 2021-04-18 DIAGNOSIS — I10 ESSENTIAL HYPERTENSION WITH GOAL BLOOD PRESSURE LESS THAN 140/90: ICD-10-CM

## 2021-04-18 RX ORDER — CELECOXIB 200 MG/1
CAPSULE ORAL
Qty: 90 CAP | Refills: 0 | Status: SHIPPED | OUTPATIENT
Start: 2021-04-18 | End: 2021-07-23 | Stop reason: SDUPTHER

## 2021-04-18 RX ORDER — PAROXETINE HYDROCHLORIDE 40 MG/1
TABLET, FILM COATED ORAL
Qty: 90 TAB | Refills: 0 | Status: SHIPPED | OUTPATIENT
Start: 2021-04-18 | End: 2021-07-23 | Stop reason: SDUPTHER

## 2021-04-18 RX ORDER — LISINOPRIL 20 MG/1
TABLET ORAL
Qty: 90 TAB | Refills: 0 | Status: SHIPPED | OUTPATIENT
Start: 2021-04-18 | End: 2021-07-23 | Stop reason: SDUPTHER

## 2021-04-19 NOTE — TELEPHONE ENCOUNTER
Future Appointments   Date Time Provider Josué Colin   4/26/2021  8:15 AM MD ANILA Zayas BS AMB   5/18/2021  8:30 AM Shiraz Scott MD [facility] JAIDA BS AMB

## 2021-05-09 DIAGNOSIS — M85.89 OSTEOPENIA OF MULTIPLE SITES: ICD-10-CM

## 2021-05-09 RX ORDER — ALENDRONATE SODIUM 35 MG/1
TABLET ORAL
Qty: 12 TAB | Refills: 1 | Status: SHIPPED | OUTPATIENT
Start: 2021-05-09 | End: 2021-07-23 | Stop reason: SDUPTHER

## 2021-05-18 ENCOUNTER — OFFICE VISIT (OUTPATIENT)
Dept: INTERNAL MEDICINE CLINIC | Age: 61
End: 2021-05-18
Payer: COMMERCIAL

## 2021-05-18 VITALS
SYSTOLIC BLOOD PRESSURE: 113 MMHG | HEIGHT: 68 IN | RESPIRATION RATE: 14 BRPM | TEMPERATURE: 97.6 F | HEART RATE: 94 BPM | BODY MASS INDEX: 36.53 KG/M2 | WEIGHT: 241 LBS | DIASTOLIC BLOOD PRESSURE: 78 MMHG | OXYGEN SATURATION: 96 %

## 2021-05-18 DIAGNOSIS — F41.9 ANXIETY: ICD-10-CM

## 2021-05-18 DIAGNOSIS — E66.01 MORBID OBESITY (HCC): ICD-10-CM

## 2021-05-18 DIAGNOSIS — R73.03 PRE-DIABETES: ICD-10-CM

## 2021-05-18 DIAGNOSIS — I10 HYPERTENSION, ESSENTIAL: ICD-10-CM

## 2021-05-18 DIAGNOSIS — Z23 ENCOUNTER FOR IMMUNIZATION: ICD-10-CM

## 2021-05-18 DIAGNOSIS — M15.9 GENERALIZED OSTEOARTHRITIS OF MULTIPLE SITES: ICD-10-CM

## 2021-05-18 DIAGNOSIS — Z12.11 COLON CANCER SCREENING: ICD-10-CM

## 2021-05-18 DIAGNOSIS — Z71.89 ADVANCED CARE PLANNING/COUNSELING DISCUSSION: ICD-10-CM

## 2021-05-18 DIAGNOSIS — M85.89 OSTEOPENIA OF MULTIPLE SITES: ICD-10-CM

## 2021-05-18 DIAGNOSIS — N95.1 MENOPAUSAL SYNDROME (HOT FLASHES): ICD-10-CM

## 2021-05-18 DIAGNOSIS — Z00.00 ROUTINE PHYSICAL EXAMINATION: Primary | ICD-10-CM

## 2021-05-18 PROCEDURE — 99396 PREV VISIT EST AGE 40-64: CPT | Performed by: INTERNAL MEDICINE

## 2021-05-18 RX ORDER — ZOSTER VACCINE RECOMBINANT, ADJUVANTED 50 MCG/0.5
KIT INTRAMUSCULAR
Qty: 0.5 ML | Refills: 1 | Status: SHIPPED | OUTPATIENT
Start: 2021-05-18 | End: 2022-05-23

## 2021-05-18 RX ORDER — CETIRIZINE HYDROCHLORIDE 10 MG/1
10 CAPSULE, LIQUID FILLED ORAL
COMMUNITY

## 2021-05-18 NOTE — ACP (ADVANCE CARE PLANNING)
Advance Care Planning   Advance Care Planning (ACP) Physician/NP/PA (Provider) Conversation    Date of ACP Conversation: 05/18/21  Persons included in Conversation:  patient  Length of ACP Conversation in minutes: <16 minutes (Non-Billable)    Authorized Decision Maker (if patient is incapable of making informed decisions):   Named in Advance Directive or Healthcare Power of       She has an advanced directive - a copy HAS NOT been provided. Reviewed DNR/DNI and patient is not interested.        Mikel Díaz MD

## 2021-05-18 NOTE — PATIENT INSTRUCTIONS

## 2021-05-18 NOTE — PROGRESS NOTES
Suzie Riedel is a 56 y.o. female who was seen in clinic today (5/18/2021) for a full physical.            Assessment & Plan:     1. Routine physical examination  -     METABOLIC PANEL, COMPREHENSIVE; Future  -     CBC W/O DIFF; Future  -     LIPID PANEL; Future  -     HEMOGLOBIN A1C WITH EAG; Future  2. Advanced care planning/counseling discussion  3. Colon cancer screening  -     REFERRAL TO GASTROENTEROLOGY  4. Encounter for immunization  -     varicella-zoster recombinant, PF, (Shingrix, PF,) 50 mcg/0.5 mL susr injection; 0.5 ml IM once and then repeat in 2-6 months., Normal, Disp-0.5 mL, R-1  5. Hypertension, essential  Assessment & Plan:   well controlled, continue current medications pending work up below  Orders:  -     METABOLIC PANEL, COMPREHENSIVE; Future  -     CBC W/O DIFF; Future  6. Pre-diabetes  Assessment & Plan:   Likely stable, due for labs, recommended weight loss & diet changes  Orders:  -     METABOLIC PANEL, COMPREHENSIVE; Future  -     HEMOGLOBIN A1C WITH EAG; Future  7. Morbid obesity ([address]  Assessment & Plan:  stable, poorly controlled, needs improvement, I have recommended the following interventions: encourage exercise and lifestyle education regarding diet. 8. Osteopenia of multiple sites  Assessment & Plan:  Asymptomatic, due for medications since starting medications (has been 3 yrs), no changed to medications pending review of DEXA   Orders:  -     DEXA BONE DENSITY STUDY AXIAL; Future  9. Anxiety  Assessment & Plan:  well controlled, she reports anxiety is not much of an issue, using SSRI more for hot flashes, I reviewed treatment options with her, I reviewed life style changes to help improve mood, continue current treatment. Did review we can try doing down to 20mg to evaluate effectiveness   10. Menopausal syndrome (hot flashes)  Assessment & Plan:   well controlled, continue current medications.   Did review trying to decrease to 20mg to get her on lowest dose that controls her symptoms but no indication to make changes at this time  11. Generalized osteoarthritis of multiple sites    Follow-up and Dispositions    · Return in about 1 year (around 5/18/2022) for FULL PHYSICAL - 30 minutes. Subjective:   Ifeanyi Astudillo is here today for a full physical.        Since last visit: no changes      End of Life Planning  This was discussed with her today and she has an advanced directive - a copy HAS NOT been provided. Reviewed DNR/DNI and patient is not interested. Health Maintenance  Immunizations:   Influenza: up to date  Tetanus: up to date  Shingles: due for Shingrix - script provided  Pneumonia: n/a    Cancer screening:   Cervical: reviewed guidelines, up to date  Breast: reviewed guidelines, up to date  Colon: reviewed guidelines, referral placed to GI       The following acute and/or chronic problems were addressed today:    Cardiovascular Review  The patient has hypertension and obesity. She reports taking medications as instructed, no medication side effects noted, patient does not perform home BP monitoring. She generally follows a low sodium diet, sedentary. Endocrine Review  She is seen for pre-diabetes. {Diabetic diet compliance: compliant most of the time. Lab review: labs reviewed and discussed with patient. Endocrine Review  She is seen for osteoporosis. Since last visit: no changes. Last DEXA was 6/26/19. She is on the following treatment: Fosamax 35mg. She reports compliance with all meds, and denies any med side effects. Mental Health Review  Patient is seen for hot flashes & anxiety. She did run out of medication a while ago and both where worse. Available GAD7 reviewed. Reports experiences the following side effects from the treatment: none. The following sections were reviewed & updated as appropriate: Problem List, Allergies, PMH, PSH, FH, and SH. Prior to Admission medications    Medication Sig Start Date End Date Taking? Authorizing Provider   Cetirizine (ZyrTEC) 10 mg cap Take  by mouth daily as needed. Yes Provider, Historical   alendronate (FOSAMAX) 35 mg tablet TAKE 1 TABLET BY MOUTH ONCE WEEKLY BEFORE BREAKFAST WITH A FULL GLASS OF WATER, ON AN EMPTY STOMACH: REMAIN UPRIGHT FOR 30 MINUTES 5/9/21  Yes Paula Carolina MD   PARoxetine (PAXIL) 40 mg tablet TAKE 1 TABLET BY MOUTH ONE TIME A DAY 4/18/21  Yes Paula Carolina MD   lisinopriL (PRINIVIL, ZESTRIL) 20 mg tablet TAKE 1 TABLET BY MOUTH ONE TIME A DAY 4/18/21  Yes Paula Carolina MD   celecoxib (CELEBREX) 200 mg capsule TAKE 1 CAPSULE BY MOUTH ONE TIME A DAY AS NEEDED FOR PAIN 4/18/21  Yes Paula Carolina MD   ALPRAZolam (Xanax) 0.5 mg tablet Take 1 Tab by mouth every twelve (12) hours as needed for Anxiety. 5/8/20  Yes Paula Carolina MD   omeprazole (PRILOSEC) 40 mg capsule Take 1 Cap by mouth daily. Patient taking differently: Take 40 mg by mouth daily as needed. 4/6/20  Yes Paula Carolina MD   acetaminophen (TYLOPHEN) 500 mg capsule Take 1 Cap by mouth every four (4) hours as needed for Pain. 1/15/20  Yes Erika Palmer PA-C   cholecalciferol, vitamin D3, (VITAMIN D3) 2,000 unit tab Take 2,000 Units by mouth daily. Yes Provider, Historical   MULTIVITAMIN PO Take 1 Tab by mouth daily. 4/27/11  Yes Provider, Historical           Review of Systems   Constitutional: Negative for chills and fever. Respiratory: Negative for cough and shortness of breath. Cardiovascular: Negative for chest pain and palpitations. Gastrointestinal: Negative for abdominal pain, blood in stool, constipation, diarrhea, heartburn, nausea and vomiting. Musculoskeletal: Positive for joint pain (bilateral shoulders, taking Celebrex daily, no side effects). Negative for myalgias. L leg pain is improving, almost resolved. Neurological: Negative for tingling, focal weakness and headaches.    Endo/Heme/Allergies: Does not bruise/bleed easily. Psychiatric/Behavioral: Negative for depression. The patient is not nervous/anxious and does not have insomnia. Subjective:   Physical Exam  Constitutional:       General: She is not in acute distress. Appearance: She is well-developed. HENT:      Right Ear: Tympanic membrane and ear canal normal.      Left Ear: Tympanic membrane and ear canal normal.   Eyes:      Conjunctiva/sclera: Conjunctivae normal.   Neck:      Thyroid: No thyromegaly. Cardiovascular:      Rate and Rhythm: Regular rhythm. Heart sounds: Normal heart sounds. No murmur. Pulmonary:      Effort: Pulmonary effort is normal.      Breath sounds: Normal breath sounds. Abdominal:      General: Bowel sounds are normal.      Palpations: Abdomen is soft. Tenderness: There is no abdominal tenderness. Musculoskeletal:      Right lower leg: No edema. Left lower leg: No edema. Lymphadenopathy:      Cervical: No cervical adenopathy.    Psychiatric:         Mood and Affect: Mood and affect normal.            Visit Vitals  /78   Pulse 94   Temp 97.6 °F (36.4 °C) (Temporal)   Resp 14   Ht 5' 7.65\" (1.718 m)   Wt 241 lb (109.3 kg)   LMP 11/25/2012   SpO2 96%   BMI 37.02 kg/m²          Kayce Worthington MD

## 2021-05-18 NOTE — ASSESSMENT & PLAN NOTE
well controlled, continue current medications.   Did review trying to decrease to 20mg to get her on lowest dose that controls her symptoms but no indication to make changes at this time

## 2021-05-18 NOTE — ASSESSMENT & PLAN NOTE
well controlled, she reports anxiety is not much of an issue, using SSRI more for hot flashes, I reviewed treatment options with her, I reviewed life style changes to help improve mood, continue current treatment.   Did review we can try doing down to 20mg to evaluate effectiveness

## 2021-05-18 NOTE — ASSESSMENT & PLAN NOTE
stable, poorly controlled, needs improvement, I have recommended the following interventions: encourage exercise and lifestyle education regarding diet.

## 2021-06-02 ENCOUNTER — HOSPITAL ENCOUNTER (OUTPATIENT)
Dept: MAMMOGRAPHY | Age: 61
Discharge: HOME | End: 2021-06-02
Attending: INTERNAL MEDICINE
Payer: COMMERCIAL

## 2021-06-02 DIAGNOSIS — M85.89 OSTEOPENIA OF MULTIPLE SITES: ICD-10-CM

## 2021-06-02 PROCEDURE — 77080 DXA BONE DENSITY AXIAL: CPT

## 2021-06-03 NOTE — PROGRESS NOTES
Results reviewed. Results released via [employer]. DEXA (Bone Density) is improved. Continue Fosamax, will plan on stopping after 5 yrs.

## 2021-07-23 DIAGNOSIS — N95.1 MENOPAUSAL SYNDROME (HOT FLASHES): ICD-10-CM

## 2021-07-23 DIAGNOSIS — I10 ESSENTIAL HYPERTENSION WITH GOAL BLOOD PRESSURE LESS THAN 140/90: ICD-10-CM

## 2021-07-23 DIAGNOSIS — M15.9 GENERALIZED OSTEOARTHRITIS: ICD-10-CM

## 2021-07-23 DIAGNOSIS — M85.89 OSTEOPENIA OF MULTIPLE SITES: ICD-10-CM

## 2021-07-25 RX ORDER — ALENDRONATE SODIUM 35 MG/1
35 TABLET ORAL
Qty: 12 TABLET | Refills: 1 | Status: SHIPPED | OUTPATIENT
Start: 2021-07-25 | End: 2022-01-24

## 2021-07-25 RX ORDER — OMEPRAZOLE 40 MG/1
40 CAPSULE, DELAYED RELEASE ORAL DAILY
Qty: 90 CAPSULE | Refills: 1 | Status: SHIPPED | OUTPATIENT
Start: 2021-07-25

## 2021-07-25 RX ORDER — CELECOXIB 200 MG/1
CAPSULE ORAL
Qty: 90 CAPSULE | Refills: 0 | Status: SHIPPED | OUTPATIENT
Start: 2021-07-25 | End: 2021-10-27

## 2021-07-25 RX ORDER — LISINOPRIL 20 MG/1
TABLET ORAL
Qty: 90 TABLET | Refills: 0 | Status: SHIPPED | OUTPATIENT
Start: 2021-07-25 | End: 2021-10-27

## 2021-07-25 RX ORDER — PAROXETINE HYDROCHLORIDE 40 MG/1
TABLET, FILM COATED ORAL
Qty: 90 TABLET | Refills: 1 | Status: SHIPPED | OUTPATIENT
Start: 2021-07-25 | End: 2022-01-24

## 2021-08-02 NOTE — PERIOP NOTES
[facility]  Ambulatory Surgery Unit  Pre-operative Instructions for Endo Procedures    Procedure Date  8/13/2021           Tentative Arrival Time To Be called at [phone]  Bring Covid Vaccine Card the day of the procedure    1. On the day of your procedure, please report to the Ambulatory Surgery Unit Registration Desk and sign in at your designated time. The Ambulatory Surgery Unit is located in [facility] on the [facility] side of the [facility] [place] across from the [address] [place] [address]. Please have all of your health insurance cards and a photo ID. 2. You must have someone with you to drive you home, as you should not drive a car for 24 hours following anesthesia. Please make arrangements for a responsible adult friend or family member to stay with you for at least the first 24 hours after your procedure. 3. Do not have anything to eat or drink (including water, gum, mints, coffee, juice) after 11:59 PM . This may not apply to medications prescribed by your physician. (Please note below the special instructions with medications to take the morning of your procedure.)    4. If applicable, follow the clear liquid diet and bowel prep instructions provided by your physician's office. If you do not have this information, or have any questions, please contact your physician's office. 5. We recommend you do not drink any alcoholic beverages for 24 hours before and after your procedure. 6. Contact your surgeons office for instructions on the following medications: non-steroidal anti-inflammatory drugs (i.e. Advil, Aleve), vitamins, and supplements. (Some surgeons will want you to stop these medications prior to surgery and others may allow you to take them)   **If you are currently taking Plavix, Coumadin, Aspirin and/or other blood-thinning agents, contact your surgeon for instructions. ** Your surgeon will partner with the physician prescribing these medications to determine if it is safe to stop or if you need to continue taking. Please do not stop taking these medications without instructions from your surgeon. 7. In an effort to help prevent surgical site infection, we ask that you shower with an anti-bacterial soap (i.e. Dial or Safeguard) on the morning of your procedure. Do not apply any lotions, powders, or deodorants after showering. 8. Wear comfortable clothes. Wear glasses instead of contacts. Do not bring any jewelry or money (other than copays or fees as instructed). Do not wear make-up, particularly mascara, the morning of your procedure. Wear your hair loose or down, no ponytails, buns, shanna pins or clips. All body piercings must be removed. 9. You should understand that if you do not follow these instructions your procedure may be cancelled. If your physical condition changes (i.e. fever, cold or flu) please contact your surgeon as soon as possible. 10. It is important that you be on time. If a situation occurs where you may be late, or if you have any questions or problems, please call [phone]. 11. Your procedure time may be subject to change. You will receive a phone call the day prior to confirm your arrival time. Special Instructions: Take all medications and inhalers, as prescribed, on the morning of surgery with a sip of water EXCEPT: no exceptions    I understand a pre-operative phone call will be made to verify my procedure time. In the event that I am not available, I give permission for a message to be left on my answering service and/or with another person?       yes         ___________________      ___________________      ___________________  (Signature of Patient)          (Witness)                   (Date and Time)

## 2021-08-12 ENCOUNTER — ANESTHESIA EVENT (OUTPATIENT)
Dept: SURGERY | Age: 61
End: 2021-08-12
Payer: COMMERCIAL

## 2021-08-13 ENCOUNTER — ANESTHESIA (OUTPATIENT)
Dept: SURGERY | Age: 61
End: 2021-08-13
Payer: COMMERCIAL

## 2021-08-13 ENCOUNTER — HOSPITAL ENCOUNTER (OUTPATIENT)
Age: 61
Setting detail: OUTPATIENT SURGERY
Discharge: HOME | End: 2021-08-13
Attending: SPECIALIST | Admitting: SPECIALIST
Payer: COMMERCIAL

## 2021-08-13 VITALS
TEMPERATURE: 98 F | HEART RATE: 80 BPM | HEIGHT: 68 IN | OXYGEN SATURATION: 97 % | DIASTOLIC BLOOD PRESSURE: 65 MMHG | WEIGHT: 238 LBS | BODY MASS INDEX: 36.07 KG/M2 | RESPIRATION RATE: 17 BRPM | SYSTOLIC BLOOD PRESSURE: 115 MMHG

## 2021-08-13 PROCEDURE — 76060000073 HC AMB SURG ANES FIRST 0.5 HR: Performed by: SPECIALIST

## 2021-08-13 PROCEDURE — 76210000040 HC AMBSU PH I REC FIRST 0.5 HR: Performed by: SPECIALIST

## 2021-08-13 PROCEDURE — 88305 TISSUE EXAM BY PATHOLOGIST: CPT

## 2021-08-13 PROCEDURE — 74011250637 HC RX REV CODE- 250/637: Performed by: SPECIALIST

## 2021-08-13 PROCEDURE — 74011250636 HC RX REV CODE- 250/636: Performed by: ANESTHESIOLOGY

## 2021-08-13 PROCEDURE — 2709999900 HC NON-CHARGEABLE SUPPLY: Performed by: SPECIALIST

## 2021-08-13 PROCEDURE — 76210000046 HC AMBSU PH II REC FIRST 0.5 HR: Performed by: SPECIALIST

## 2021-08-13 PROCEDURE — 77030021593 HC FCPS BIOP ENDOSC BSC -A: Performed by: SPECIALIST

## 2021-08-13 PROCEDURE — 74011000250 HC RX REV CODE- 250: Performed by: NURSE ANESTHETIST, CERTIFIED REGISTERED

## 2021-08-13 PROCEDURE — 76030000002 HC AMB SURG OR TIME FIRST 0.: Performed by: SPECIALIST

## 2021-08-13 PROCEDURE — 74011250636 HC RX REV CODE- 250/636: Performed by: NURSE ANESTHETIST, CERTIFIED REGISTERED

## 2021-08-13 PROCEDURE — 77030021352 HC CBL LD SYS DISP COVD -B: Performed by: SPECIALIST

## 2021-08-13 RX ORDER — SODIUM CHLORIDE, SODIUM LACTATE, POTASSIUM CHLORIDE, CALCIUM CHLORIDE 600; 310; 30; 20 MG/100ML; MG/100ML; MG/100ML; MG/100ML
25 INJECTION, SOLUTION INTRAVENOUS CONTINUOUS
Status: DISCONTINUED | OUTPATIENT
Start: 2021-08-13 | End: 2021-08-13 | Stop reason: HOSPADM

## 2021-08-13 RX ORDER — SODIUM CHLORIDE 0.9 % (FLUSH) 0.9 %
5-40 SYRINGE (ML) INJECTION EVERY 8 HOURS
Status: DISCONTINUED | OUTPATIENT
Start: 2021-08-13 | End: 2021-08-13 | Stop reason: HOSPADM

## 2021-08-13 RX ORDER — DIPHENHYDRAMINE HYDROCHLORIDE 50 MG/ML
12.5 INJECTION, SOLUTION INTRAMUSCULAR; INTRAVENOUS AS NEEDED
Status: DISCONTINUED | OUTPATIENT
Start: 2021-08-13 | End: 2021-08-13 | Stop reason: HOSPADM

## 2021-08-13 RX ORDER — ONDANSETRON 2 MG/ML
4 INJECTION INTRAMUSCULAR; INTRAVENOUS AS NEEDED
Status: DISCONTINUED | OUTPATIENT
Start: 2021-08-13 | End: 2021-08-13 | Stop reason: HOSPADM

## 2021-08-13 RX ORDER — SODIUM CHLORIDE 0.9 % (FLUSH) 0.9 %
5-40 SYRINGE (ML) INJECTION AS NEEDED
Status: DISCONTINUED | OUTPATIENT
Start: 2021-08-13 | End: 2021-08-13 | Stop reason: HOSPADM

## 2021-08-13 RX ORDER — LIDOCAINE HYDROCHLORIDE 10 MG/ML
0.1 INJECTION, SOLUTION EPIDURAL; INFILTRATION; INTRACAUDAL; PERINEURAL AS NEEDED
Status: DISCONTINUED | OUTPATIENT
Start: 2021-08-13 | End: 2021-08-13 | Stop reason: HOSPADM

## 2021-08-13 RX ORDER — DEXTROMETHORPHAN/PSEUDOEPHED 2.5-7.5/.8
DROPS ORAL AS NEEDED
Status: DISCONTINUED | OUTPATIENT
Start: 2021-08-13 | End: 2021-08-13 | Stop reason: HOSPADM

## 2021-08-13 RX ORDER — PROPOFOL 10 MG/ML
INJECTION, EMULSION INTRAVENOUS AS NEEDED
Status: DISCONTINUED | OUTPATIENT
Start: 2021-08-13 | End: 2021-08-13 | Stop reason: HOSPADM

## 2021-08-13 RX ORDER — FENTANYL CITRATE 50 UG/ML
25 INJECTION, SOLUTION INTRAMUSCULAR; INTRAVENOUS
Status: DISCONTINUED | OUTPATIENT
Start: 2021-08-13 | End: 2021-08-13 | Stop reason: HOSPADM

## 2021-08-13 RX ORDER — GLYCOPYRROLATE 0.2 MG/ML
INJECTION INTRAMUSCULAR; INTRAVENOUS AS NEEDED
Status: DISCONTINUED | OUTPATIENT
Start: 2021-08-13 | End: 2021-08-13 | Stop reason: HOSPADM

## 2021-08-13 RX ADMIN — GLYCOPYRROLATE 0.1 MG: 0.2 INJECTION, SOLUTION INTRAMUSCULAR; INTRAVENOUS at 10:59

## 2021-08-13 RX ADMIN — PROPOFOL 20 MG: 10 INJECTION, EMULSION INTRAVENOUS at 11:10

## 2021-08-13 RX ADMIN — PROPOFOL 100 MG: 10 INJECTION, EMULSION INTRAVENOUS at 11:02

## 2021-08-13 RX ADMIN — SODIUM CHLORIDE, POTASSIUM CHLORIDE, SODIUM LACTATE AND CALCIUM CHLORIDE 25 ML/HR: 600; 310; 30; 20 INJECTION, SOLUTION INTRAVENOUS at 09:42

## 2021-08-13 RX ADMIN — PROPOFOL 100 MG: 10 INJECTION, EMULSION INTRAVENOUS at 11:06

## 2021-08-13 NOTE — ANESTHESIA POSTPROCEDURE EVALUATION
Procedure(s):  COLONOSCOPY  COLON BIOPSY.     general, total IV anesthesia    Anesthesia Post Evaluation      Multimodal analgesia: multimodal analgesia not used between 6 hours prior to anesthesia start to PACU discharge  Patient location during evaluation: PACU  Patient participation: complete - patient participated  Level of consciousness: awake and alert  Pain score: 0  Airway patency: patent  Anesthetic complications: no  Cardiovascular status: acceptable  Respiratory status: acceptable  Hydration status: acceptable  Post anesthesia nausea and vomiting:  none  Final Post Anesthesia Temperature Assessment:  Normothermia (36.0-37.5 degrees C)      INITIAL Post-op Vital signs:   Vitals Value Taken Time   /65 08/13/21 1135   Temp 36.7 °C (98 °F) 08/13/21 1135   Pulse 80 08/13/21 1135   Resp 17 08/13/21 1135   SpO2 97 % 08/13/21 1135

## 2021-08-13 NOTE — PERIOP NOTES
Patient received to PACU, VSS. Patient drowsy but arouses to voice. 1128: Patient awake, states urge to use restroom - bedpan provided. 1132: Dr. Daryle Conner at bedside. Patient tolerating liquids. 1200: Discharge instructions given. Patient and friend Vincenzo Wells verbalize understanding of instructions and follow up appointment. Patient and friend state ready for discharge. IV removed. Patient discharged at this time by wheelchair with belongings, friend to provide transportation home.

## 2021-08-13 NOTE — DISCHARGE INSTRUCTIONS
Valerie Aguilar  [id number]  1960              Procedure  Discharge Instructions:      Discomfort:  Redness at IV site- apply cold compress to area; if redness or soreness persist- contact your physician  There may be a slight amount of blood passed from the rectum  Gaseous discomfort- walking, belching will help relieve any discomfort  You may not operate a vehicle for 12 hours  You may not engage in an occupation involving machinery or appliances for rest of today  You may not drink alcoholic beverages for at least 12 hours  Avoid making any critical decisions for at least 24 hour  DIET:   You may resume your normal diet today. You should not overeat or \"feast\" today as your abdomen may become distended or uncomfortable. MEDICATIONS:   I reconciled this list from the list you gave us when you came today for the procedure. Please clarify with me, your primary care physician and the nurse who is discharging you if we have any discrepancies. Aspirin and or non-steroidal medication (Ibuprofen, Motrin, naproxen, etc.) is ok in limited quantities. ACTIVITY:  You may resume your normal daily activities it is recommended that you spend the remainder of the day resting -  avoid any strenuous activity. CALL M.D. ANY SIGN OF:  Increasing pain, nausea, vomiting  Abdominal distension (swelling)  New increased bleeding (oral or rectal)  Fever (chills)  Pain in chest area  Bloody discharge from nose or mouth  Shortness of breath          Follow-up Instructions:   Call Dr. Anil Boyer for the results of  biopsy in approximately one week  Telephone #  [phone]  Follow up visit as needed or as previously scheduled. Recall colonoscopy ten years  Max Smith MD  11:27 AM  8/13/2021           DO NOT TAKE SLEEPING MEDICATIONS OR ANTIANXIETY MEDICATIONS WHILE TAKING NARCOTIC PAIN MEDICATIONS,  ESPECIALLY THE NIGHT OF ANESTHESIA.     CPAP PATIENTS BE SURE TO WEAR MACHINE WHENEVER NAPPING OR SLEEPING. DISCHARGE SUMMARY from Nurse    The following personal items collected during your admission are returned to you:   Dental Appliance: Dental Appliances: None  Vision: Visual Aid: Glasses (PACU)  Hearing Aid:    Jewelry:    Clothing:    Other Valuables:    Valuables sent to safe:        PATIENT INSTRUCTIONS:    Anesthesia Discharge Instructions for Procedural Area requiring Sedation (MAC Anesthesia, Cath Lab, Endo and Radiology): You have been given medications during your procedure that may affect your memory and mental judgement for the next 24 hours. During this time frame for your safety, please follow the instructions listed below :    Have a responsible adult to drive you home and be with you for at least 12 hours.  Rest today and resume normal activities tomorrow.  Start with a soft bland diet and advance as tolerated to your recommended diet.  Do not drive any motor vehicle or operate mechanical or electrical equipment prior to [employer].  Avoid making critical decisions or signing legal documents prior to 6am tomorrow.  Do not drink alcohol prior to 6am tomorrow.  If you have sleep apnea and you plan to go home and take a nap, please use your CPAP machine not only at bedtime, but also while napping for 24 hours.  If you notice any redness or swelling on parts of your body where IV medications were given, place a warm wet washcloth over the area for 20 minutes at a time until the redness or swelling goes away. If you still have redness or swelling after 2-3 days, please call us. · You will receive a Post Operative Call from one of the Recovery Room Nurses on the day after your surgery to check on you. It is very important for us to know how you are recovering after your surgery. If you have an issue or need to speak with someone, please call your surgeon, do not wait for the post operative call.     · You may receive an e-mail or letter in the mail from Rowan Doll regarding your experience with us in the Ambulatory Surgery Unit. Your feedback is valuable to us and we appreciate your participation in the survey. · We wish you a speedy recovery ? What to do at Home:      *  Please give a list of your current medications to your Primary Care Provider. *  Please update this list whenever your medications are discontinued, doses are      changed, or new medications (including over-the-counter products) are added. *  Please carry medication information at all times in case of emergency situations. If you have not received your influenza and/or pneumococcal vaccine, please follow up with your primary care physician. The discharge information has been reviewed with the patient and caregiver. The patient and caregiver verbalized understanding.

## 2021-08-13 NOTE — PERIOP NOTES
Tamika Sawyer  1960  [id number]    Situation:  Verbal report given from: Carol Luque RN and LESLEY Lewis CRNA  Procedure: Procedure(s):  COLONOSCOPY  COLON BIOPSY    Background:    Preoperative diagnosis: SCREENING    Postoperative diagnosis: Erythema, external hemorrhoids, Ileocecal valve    :  Dr. Arlyn Galvin    Assistant(s): Circ-1: Aguilar Platt RN  Circ-2: Sherita Casanova RN  Scrub Tech-1: Barbara Weiner    Specimens:   ID Type Source Tests Collected by Time Destination   1 : Ileocecal valve biopsy Preservative Colon, Ileocecal valve  Palma Soriano MD 8/13/2021 1111 Pathology       Assessment:  Intra-procedure medications         Anesthesia gave intra-procedure sedation and medications, see anesthesia flow sheet     Intravenous fluids: LR@ KVO     Vital signs stable       Recommendation:    Permission to share finding with Bartolo Mcgarry

## 2021-08-13 NOTE — H&P
Pre-endoscopy H and P    The patient was seen and examined in the [facility] pre op. The airway was assessed and docuemented. The problem list, past medical history, and medications were reviewed. Patient Active Problem List   Diagnosis Code    Hypertension, essential I10    Colon polyps K63.5    Menopausal syndrome (hot flashes) N95.1    Restless leg syndrome G25.81    Anxiety F41.9    Cervical polyp N84.1    Generalized osteoarthritis of multiple sites M15.9    History of colon polyps Z86.010    Pre-diabetes R73.03    Osteopenia M85.80    Morbid obesity (HCC) E66.01    Fatty liver K76.0    S/P TKR (total knee replacement), left U02.726     Social History     Socioeconomic History    Marital status:      Spouse name: Not on file    Number of children: Not on file    Years of education: Not on file    Highest education level: Not on file   Occupational History    Not on file   Tobacco Use    Smoking status: Never Smoker    Smokeless tobacco: Never Used   Vaping Use    Vaping Use: Never used   Substance and Sexual Activity    Alcohol use: Yes     Alcohol/week: 7.0 standard drinks     Types: 1 Cans of beer, 6 Standard drinks or equivalent per week     Comment: socially    Drug use: Never    Sexual activity: Yes     Partners: Male     Birth control/protection: None   Other Topics Concern    Not on file   Social History Narrative    ** Merged History Encounter **          Social Determinants of Health     Financial Resource Strain:     Difficulty of Paying Living Expenses:    Food Insecurity:     Worried About Running Out of Food in the Last Year:     Ran Out of Food in the Last Year:    Transportation Needs:     Lack of Transportation (Medical):      Lack of Transportation (Non-Medical):    Physical Activity:     Days of Exercise per Week:     Minutes of Exercise per Session:    Stress:     Feeling of Stress :    Social Connections:     Frequency of Communication with Friends and Family:     Frequency of Social Gatherings with Friends and Family:     Attends Anabaptist Services:     Active Member of Clubs or Organizations:     Attends Club or Organization Meetings:     Marital Status:    Intimate Partner Violence:     Fear of Current or Ex-Partner:     Emotionally Abused:     Physically Abused:     Sexually Abused:      Past Medical History:   Diagnosis Date    Anxiety     Cervical polyp 2014    BENIGN ENDOCERVICAL POLYP    Colon polyps     GERD (gastroesophageal reflux disease)     Hypertension     LGSIL (low grade squamous intraepithelial lesion) on Pap smear 2006    s/p colposcopy, PAP nl since then    Menopausal syndrome (hot flashes) 9/9/2013    Obesity     Osteoarthritis     Osteopenia 9/27/2016    Primary osteoarthritis of knees, bilateral 4/13/2016    Restless leg syndrome 9/9/2013     The patient has a family history of na    Prior to Admission Medications   Prescriptions Last Dose Informant Patient Reported? Taking? ALPRAZolam (Xanax) 0.5 mg tablet 8/13/2021 at Unknown time  No Yes   Sig: Take 1 Tab by mouth every twelve (12) hours as needed for Anxiety. Cetirizine (ZyrTEC) 10 mg cap 8/6/2021 at Unknown time  Yes Yes   Sig: Take 10 mg by mouth daily as needed. MULTIVITAMIN PO 8/6/2021 at Unknown time  Yes Yes   Sig: Take 1 Tab by mouth daily. PARoxetine (PAXIL) 40 mg tablet 8/13/2021 at Unknown time  No Yes   Sig: TAKE 1 TABLET BY MOUTH ONE TIME A DAY   acetaminophen (TYLOPHEN) 500 mg capsule 8/6/2021 at Unknown time  No Yes   Sig: Take 1 Cap by mouth every four (4) hours as needed for Pain. alendronate (FOSAMAX) 35 mg tablet 8/6/2021 at Unknown time  No Yes   Sig: Take 1 Tablet by mouth every seven (7) days.  TAKE 1 TABLET BY MOUTH ONCE WEEKLY BEFORE BREAKFAST WITH A FULL GLASS OF WATER, ON AN EMPTY STOMACH: REMAIN UPRIGHT FOR 30 MINUTES   celecoxib (CELEBREX) 200 mg capsule 8/13/2021 at Unknown time  No Yes   Sig: TAKE 1 CAPSULE BY MOUTH ONE TIME A DAY AS NEEDED FOR PAIN   Patient taking differently: Take 200 mg by mouth daily as needed. TAKE 1 CAPSULE BY MOUTH ONE TIME A DAY AS NEEDED FOR PAIN   cholecalciferol, vitamin D3, (VITAMIN D3) 2,000 unit tab 2021 at Unknown time  Yes Yes   Sig: Take 2,000 Units by mouth daily. lisinopriL (PRINIVIL, ZESTRIL) 20 mg tablet 2021 at Unknown time  No Yes   Sig: TAKE 1 TABLET BY MOUTH ONE TIME A DAY   Patient taking differently: Take 20 mg by mouth daily. TAKE 1 TABLET BY MOUTH ONE TIME A DAY   omeprazole (PRILOSEC) 40 mg capsule 2021 at Unknown time  No Yes   Sig: Take 1 Capsule by mouth daily. varicella-zoster recombinant, PF, (Shingrix, PF,) 50 mcg/0.5 mL susr injection Unknown at Unknown time  No No   Si.5 ml IM once and then repeat in 2-6 months. Facility-Administered Medications: None           The review of systems is:  negative for shortness of breath or chest pain      The heart, lungs, and mental status were satisfactory for the administration of anesthesia sedation and for the procedure. I discussed with the patient the objectives, risks, consequences and alternatives to the procedure. The patient was counseled at length about the risks of cesario Covid-19 during their perioperative period and any recovery window from their procedure. The patient was made aware that cesario Covid-19  may worsen their prognosis for recovering from their procedure and lend to a higher morbidity and/or mortality risk. All material risks, benefits, and reasonable alternatives including postponing the procedure were discussed. The patient does  wish to proceed with the procedure at this time.         Richard Valente MD  2021  10:50 AM

## 2021-08-13 NOTE — ANESTHESIA PREPROCEDURE EVALUATION
Relevant Problems   CARDIOVASCULAR   (+) Hypertension, essential      GASTROINTESTINAL   (+) Fatty liver      ENDOCRINE   (+) Morbid obesity (HCC)       Anesthetic History   No history of anesthetic complications            Review of Systems / Medical History  Patient summary reviewed, nursing notes reviewed and pertinent labs reviewed    Pulmonary  Within defined limits                 Neuro/Psych         Psychiatric history ( anxiety)     Cardiovascular  Within defined limits  Hypertension                Comments: 1/20 EKG= SR   GI/Hepatic/Renal     GERD: well controlled          Comments: H/o colon polyps Endo/Other        Obesity and arthritis     Other Findings   Comments: Restless leg syndrome  osteopenia         Physical Exam    Airway  Mallampati: III  TM Distance: 4 - 6 cm  Neck ROM: normal range of motion   Mouth opening: Normal     Cardiovascular    Rhythm: regular  Rate: normal         Dental    Dentition: Bridges and Caps/crowns  Comments: Multiple crowns   Pulmonary  Breath sounds clear to auscultation               Abdominal  GI exam deferred       Other Findings            Anesthetic Plan    ASA: 2  Anesthesia type: general and total IV anesthesia      Post-op pain plan if not by surgeon: peripheral nerve block single    Induction: Intravenous  Anesthetic plan and risks discussed with: Patient

## 2021-08-13 NOTE — PROCEDURES
Colonoscopy    Indications: history colon polyps    Pre-operative Diagnosis: see above    Assistant(s):  see chart   BELL Perey[facility], tech    Medications:  See anesthesia form    Post-operative Diagnosis:  Erythema ileocecal valve, external hemorrhoids,            Procedure Details   Prior to the procedure its objectives, risks, consequences and alternatives were discussed with the patient who then elected to proceed. All questions were answered. Digital Rectal Exam:  Showed external hemorrhoids    The Olympus videocolonoscope was inserted in the rectum and advanced to the cecum. The cecum was identified by typical landmarks. The terminal ileum was intubated and the mucosa of the ileum appeared normal.  The colonoscope was slowly and carefully withdrawn as the mucosa was inspected. There was adherent bile atop the ileocecal valve. There was was some erythema present. It was not impressive. I took biopsies of the valve. No other abnormalities were noted. Retroflexion in the rectum was negative. Photos to document the ileocecal valve, appendiceal orifice and retroflexion exam were obtained. The preparation was adequate      Estimated Blood Loss:  none    Specimens:  Ileocecal valve biopsy    Findings:  Erythema valve  External hemorrhoids    Complications:  none    Implants:  none    Repeat colonoscopy is recommended in:  Ten years.                Sita Lombardi MD  11:23 AM  8/13/2021

## 2021-08-13 NOTE — PERIOP NOTES
Patient states that Ryan Augustin will be with them for at least 24 hours following today's procedure. Permission received to review discharge instructions and discuss private health information with Ryan Augustin.

## 2021-09-16 LAB
CHOLEST SERPL-MCNC: 258 MG/DL
GLUCOSE SERPL-MCNC: 120 MG/DL (ref 65–100)
HDLC SERPL-MCNC: 50 MG/DL
LDLC SERPL CALC-MCNC: 154.4 MG/DL (ref 0–100)
TRIGL SERPL-MCNC: 268 MG/DL (ref ?–150)

## 2021-10-14 ENCOUNTER — OFFICE VISIT (OUTPATIENT)
Dept: URGENT CARE | Age: 61
End: 2021-10-14
Payer: COMMERCIAL

## 2021-10-14 VITALS — TEMPERATURE: 99.1 F | HEART RATE: 94 BPM | OXYGEN SATURATION: 96 % | RESPIRATION RATE: 12 BRPM

## 2021-10-14 DIAGNOSIS — Z20.822 ENCOUNTER FOR LABORATORY TESTING FOR COVID-19 VIRUS: Primary | ICD-10-CM

## 2021-10-14 LAB — SARS-COV-2 POC: NEGATIVE

## 2021-10-14 PROCEDURE — 87426 SARSCOV CORONAVIRUS AG IA: CPT | Performed by: NURSE PRACTITIONER

## 2021-10-14 PROCEDURE — S9083 URGENT CARE CENTER GLOBAL: HCPCS | Performed by: NURSE PRACTITIONER

## 2021-10-14 NOTE — PROGRESS NOTES
Subjective: (As above and below)       This patient was seen in [facility] at [address] [place] [address] Urgent Care while outdoors at their vehicle due to COVID-19 pandemic with PPE and focused examination in order to decrease community viral transmission. The patient/guardian gave verbal consent to treat. Chief Complaint   Patient presents with    Covid Testing     pt request rapid test. Traveling tomorrow. no recent exposure. Ross Bryan is a 59 y.o. female who presents for covid 19 testing for travel. Requires rapid covid 19 test  Denies any exposure. Does say she vomited x 1 after drinking diet soda on empty stomach this morning. Denies any fever, chills, URI symptoms, body aches, loose stool or any additional vomiting or stomach pains      Review of Systems - negative except as listed above    Reviewed PmHx, RxHx, FmHx, SocHx, AllgHx and updated in chart.   Family History   Problem Relation Age of Onset   [address] Parkinsonism Father     Hypertension Father     Arthritis-osteo Father     Cancer Father         PROSTATE-TREATED WITH ESTROGEN    Breast Cancer Sister 48    Glaucoma Mother     Heart Disease Mother         multiple MI's in her 52's    Other Sister         partial colon removal    Anesth Problems Neg Hx        Past Medical History:   Diagnosis Date    Anxiety     Cervical polyp 2014    BENIGN ENDOCERVICAL POLYP    Colon polyps     GERD (gastroesophageal reflux disease)     Hypertension     LGSIL (low grade squamous intraepithelial lesion) on Pap smear 2006    s/p colposcopy, PAP nl since then    Menopausal syndrome (hot flashes) 9/9/2013    Obesity     Osteoarthritis     Osteopenia 9/27/2016    Primary osteoarthritis of knees, bilateral 4/13/2016    Restless leg syndrome 9/9/2013      Social History     Socioeconomic History    Marital status:      Spouse name: Not on file    Number of children: Not on file    Years of education: Not on file    Highest education level: Not on file   Tobacco Use    Smoking status: Never Smoker    Smokeless tobacco: Never Used   Vaping Use    Vaping Use: Never used   Substance and Sexual Activity    Alcohol use: Yes     Alcohol/week: 7.0 standard drinks     Types: 1 Cans of beer, 6 Standard drinks or equivalent per week     Comment: socially    Drug use: Never    Sexual activity: Yes     Partners: Male     Birth control/protection: None   Social History Narrative    ** Merged History Encounter **          Social Determinants of Health     Financial Resource Strain:     Difficulty of Paying Living Expenses:    Food Insecurity:     Worried About Running Out of Food in the Last Year:     Ran Out of Food in the Last Year:    Transportation Needs:     Lack of Transportation (Medical):  Lack of Transportation (Non-Medical):    Physical Activity:     Days of Exercise per Week:     Minutes of Exercise per Session:    Stress:     Feeling of Stress :    Social Connections:     Frequency of Communication with Friends and Family:     Frequency of Social Gatherings with Friends and Family:     Attends Church Services:     Active Member of Clubs or Organizations:     Attends Club or Organization Meetings:     Marital Status:           Current Outpatient Medications   Medication Sig    omeprazole (PRILOSEC) 40 mg capsule Take 1 Capsule by mouth daily.  PARoxetine (PAXIL) 40 mg tablet TAKE 1 TABLET BY MOUTH ONE TIME A DAY    lisinopriL (PRINIVIL, ZESTRIL) 20 mg tablet TAKE 1 TABLET BY MOUTH ONE TIME A DAY (Patient taking differently: Take 20 mg by mouth daily. TAKE 1 TABLET BY MOUTH ONE TIME A DAY)    celecoxib (CELEBREX) 200 mg capsule TAKE 1 CAPSULE BY MOUTH ONE TIME A DAY AS NEEDED FOR PAIN (Patient taking differently: Take 200 mg by mouth daily as needed. TAKE 1 CAPSULE BY MOUTH ONE TIME A DAY AS NEEDED FOR PAIN)    alendronate (FOSAMAX) 35 mg tablet Take 1 Tablet by mouth every seven (7) days.  TAKE 1 TABLET BY MOUTH ONCE WEEKLY BEFORE BREAKFAST WITH A FULL GLASS OF WATER, ON AN EMPTY STOMACH: REMAIN UPRIGHT FOR 30 MINUTES    Cetirizine (ZyrTEC) 10 mg cap Take 10 mg by mouth daily as needed.  varicella-zoster recombinant, PF, (Shingrix, PF,) 50 mcg/0.5 mL susr injection 0.5 ml IM once and then repeat in 2-6 months.  ALPRAZolam (Xanax) 0.5 mg tablet Take 1 Tab by mouth every twelve (12) hours as needed for Anxiety.  acetaminophen (TYLOPHEN) 500 mg capsule Take 1 Cap by mouth every four (4) hours as needed for Pain.  cholecalciferol, vitamin D3, (VITAMIN D3) 2,000 unit tab Take 2,000 Units by mouth daily.  MULTIVITAMIN PO Take 1 Tab by mouth daily. No current facility-administered medications for this visit. Objective:     Vitals:    10/14/21 1213   Pulse: (!) 103   Resp: 12   Temp: 99.1 °F (37.3 °C)   SpO2: 96%       Physical Exam  General appearance  appears well hydrated and does not appear toxic, no acute distress  Eyes - EOMs intact. Non injected. No scleral icterus   Ears - no external swelling  Nose -  No purulent drainage  Mouth - OP clear without swelling, exudate or lesion. Mucus membranes moist. Uvula midline. Neck/Lymphatics  trachea midline, full AROM  Chest - Normal breathing effort no wheeze rales, rhonchi or diminishments bilaterally. Heart - RRR, no murmurs  Skin - no observable rashes or pallor  Neurologic- alert and oriented x 3  Psychiatric- normal mood, behavior and though content. Assessment/ Plan:     1. Encounter for laboratory testing for COVID-19 virus    - AMB POC SARS-COV-2    Rapid covid 19 test is negative today. Consider re testing if negative with any new development of symptoms      Test Results:  Recent Results (from the past 6 hour(s))   AMB POC SARS-COV-2    Collection Time: 10/14/21 12:25 PM   Result Value Ref Range    SARS-COV-2 POC Negative Negative       Follow up:   We have reviewed worsening/concerning signs and symptoms that may warrant seeking immediate care      Xiang Traylor, NP

## 2021-10-27 DIAGNOSIS — I10 ESSENTIAL HYPERTENSION WITH GOAL BLOOD PRESSURE LESS THAN 140/90: ICD-10-CM

## 2021-10-27 DIAGNOSIS — M15.9 GENERALIZED OSTEOARTHRITIS: ICD-10-CM

## 2021-10-27 RX ORDER — LISINOPRIL 20 MG/1
TABLET ORAL
Qty: 90 TABLET | Refills: 1 | Status: SHIPPED | OUTPATIENT
Start: 2021-10-27 | End: 2022-04-28

## 2021-10-27 RX ORDER — CELECOXIB 200 MG/1
CAPSULE ORAL
Qty: 90 CAPSULE | Refills: 1 | Status: SHIPPED | OUTPATIENT
Start: 2021-10-27 | End: 2022-04-28

## 2022-01-24 DIAGNOSIS — M85.89 OSTEOPENIA OF MULTIPLE SITES: ICD-10-CM

## 2022-01-24 DIAGNOSIS — N95.1 MENOPAUSAL SYNDROME (HOT FLASHES): ICD-10-CM

## 2022-01-24 RX ORDER — ALENDRONATE SODIUM 35 MG/1
TABLET ORAL
Qty: 12 TABLET | Refills: 1 | Status: SHIPPED | OUTPATIENT
Start: 2022-01-24 | End: 2022-07-13 | Stop reason: SDUPTHER

## 2022-01-24 RX ORDER — PAROXETINE HYDROCHLORIDE 40 MG/1
TABLET, FILM COATED ORAL
Qty: 90 TABLET | Refills: 1 | Status: SHIPPED | OUTPATIENT
Start: 2022-01-24 | End: 2022-07-25 | Stop reason: SDUPTHER

## 2022-03-18 PROBLEM — E66.01 MORBID OBESITY (HCC): Status: ACTIVE | Noted: 2018-05-14

## 2022-03-18 PROBLEM — K76.0 FATTY LIVER: Status: ACTIVE | Noted: 2019-06-06

## 2022-03-19 PROBLEM — Z96.652 S/P TKR (TOTAL KNEE REPLACEMENT), LEFT: Status: ACTIVE | Noted: 2020-01-15

## 2022-04-05 NOTE — PROGRESS NOTES
ASSESSMENT/PLAN:  Below is the assessment and plan developed based on review of pertinent history, physical exam, labs, studies, and medications. 1. Partial hamstring tear, left, initial encounter  -     MRI HIP LT WO CONT; Future  -     methocarbamoL (ROBAXIN) 500 mg tablet; Take 1 Tablet by mouth three (3) times daily. , Normal, Disp-30 Tablet, R-0  2. Opioid use, unspecified with unspecified opioid-induced disorder      Return for Follow-up after diagnostic test.    In discussion with the patient, we considered the numerus possible diagnoses that could be contributing to their present symptoms. We also deliberated on the extensive management options that must be considered to treat their current condition. We reviewed their accessible prior medical records, diagnostic tests, and current health and employment information. We considered how these symptoms were affecting the patient´s activities of daily living as well as employment and fitness activities. The patient had various questions regarding the possible risks, benefits, complications, morbidity and mortality regarding their diagnosis and treatment options. The patients´ comorbidities were considered, and I advocated that they consider maximizing lifestyle modification through nutrition and exercise to aid in addressing their symptoms. Shared decision making yielded an understanding to move forward with conservation treatment preferences. The patient expressed understanding that if conservative management fails to alleviate the present symptoms they will return to office for re-evaluation and consideration of additional diagnostic tests and potential surgical options.      In the interim, we have recommended ice, elevation, and anti-inflammatory medications along with a physician directed home exercise program. We discussed the risks and common side effects of anti-inflammatory medications and instructed the patient to discontinue the medication and contact us if they experienced any side effects. The patient was encouraged to discuss the possible side effects with their family physician or pharmacist prior to initiating any new medications. After a long discussion regarding treatment options, we have decided to prescribe an oral medication. We discussed the risks and common side effects of the medication and instructed the patient to discontinue the medication and contact us if they experienced any side effects. We also encouraged the patient to discuss the possible side effects with their family physician or pharmacist prior to initiating any new medications. Given that the patient's symptoms are increasing in frequency and duration, we have decided to evaluate the etiology of the pain and loss of function with an MRI. We discussed the risks of an MRI which include, but are not limited to the enclosed space, noisy environment, magnetic effect on implanted metal. We also talked about the fact that MRI is also contraindicated in the presence of internal metallic objects such as bullets or shrapnel, as well as surgical clips, pins, plates, screws, metal sutures, or wire mesh. We talked about the fact that MRI does not use radiation, but it may be contrindicated if the patient has implanted pacemakers, intracranial aneurysm clips, cochlear implants, certain prosthetic devices, implanted drug infusion pumps, neurostimulators, bone-growth stimulators, certain intrauterine contraceptive devices; or any other type of iron-based metal implants. We discussed the fact that if you are pregnant or suspect that you may be pregnant, you should notify your physician and consult with your primary care or obstetrician before having an MRI. Although rare, we talked about the fact that if contrast dye is used, there is a risk for allergic reaction to the dye.  Patients who are allergic to or sensitive to medications, contrast dye, iodine, or shellfish should notify the radiologist or technologist prior to the administration of dye. MRI contrast may also influence other conditions such as allergies, asthma, anemia, hypotension (low blood pressure), and sickle cell disease. The patient has expressed understanding of these risks and I will see the patient back after the MRI to discuss the findings as well as the treatment options. We talked about the fact that I was concerned for a proximal hamstring tear. Given the fact she had a CT of think an MRI would be more accurate test to diagnose the extent of her injury. We will allow her to continue to [employer] work. We will continue her Percocet as well as Robaxin. We will see her back after 3T MRI to discuss findings as well as the treatment options. Given her low level of recreational activity hopefully we can proceed with nonoperative treatment. SUBJECTIVE/OBJECTIVE:  Zana Grayson (: 1960) is a 59 y.o. female, patient,here for evaluation of the Leg Pain (left hamstring)  . The patient reports that she was vacationing in [place] this week when she slipped on a puddle and perform the splits. She reports she felt a pop had immediate pain. She was taken to the hospital.  She reports that some bruising. She has been on Percocet and Robaxin. She reports they did a CT scan in the ER. She denies any numbness or tingling. Physical Exam    Examination left proximal hamstring murmur she has limited range of motion. She has ecchymosis posteriorly. She cannot tolerate strength testing. She is tender to palpation posteriorly. She is neurovascular intact distally. Imaging: In review of her CT report there is concern for proximal hamstring tearing on the left side. No Known Allergies    Current Outpatient Medications   Medication Sig    oxyCODONE-acetaminophen (PERCOCET) 5-325 mg per tablet     methocarbamoL (ROBAXIN) 500 mg tablet Take 1 Tablet by mouth three (3) times daily.     PARoxetine (PAXIL) 40 mg tablet TAKE 1 TABLET BY MOUTH ONE TIME A DAY    alendronate (FOSAMAX) 35 mg tablet TAKE 1 TABLET BY MOUTH ONCE WEEKLY BEFORE BREAKFAST WITH A FULL GLASS OF WATER, ON AN EMPTY STOMACH: REMAIN UPRIGHT FOR 30 MINUTES    lisinopriL (PRINIVIL, ZESTRIL) 20 mg tablet TAKE 1 TABLET BY MOUTH ONE TIME A DAY    celecoxib (CELEBREX) 200 mg capsule TAKE 1 CAPSULE BY MOUTH ONE TIME A DAY AS NEEDED FOR PAIN    omeprazole (PRILOSEC) 40 mg capsule Take 1 Capsule by mouth daily.  Cetirizine (ZyrTEC) 10 mg cap Take 10 mg by mouth daily as needed.  varicella-zoster recombinant, PF, (Shingrix, PF,) 50 mcg/0.5 mL susr injection 0.5 ml IM once and then repeat in 2-6 months.  ALPRAZolam (Xanax) 0.5 mg tablet Take 1 Tab by mouth every twelve (12) hours as needed for Anxiety.  acetaminophen (TYLOPHEN) 500 mg capsule Take 1 Cap by mouth every four (4) hours as needed for Pain.  cholecalciferol, vitamin D3, (VITAMIN D3) 2,000 unit tab Take 2,000 Units by mouth daily.  MULTIVITAMIN PO Take 1 Tab by mouth daily. No current facility-administered medications for this visit.        Past Medical History:   Diagnosis Date    Anxiety     Cervical polyp 2014    BENIGN ENDOCERVICAL POLYP    Colon polyps     GERD (gastroesophageal reflux disease)     Hypertension     LGSIL (low grade squamous intraepithelial lesion) on Pap smear 2006    s/p colposcopy, PAP nl since then    Menopausal syndrome (hot flashes) 9/9/2013    Obesity     Osteoarthritis     Osteopenia 9/27/2016    Primary osteoarthritis of knees, bilateral 4/13/2016    Restless leg syndrome 9/9/2013       Past Surgical History:   Procedure Laterality Date    COLONOSCOPY N/A 8/13/2021    COLONOSCOPY performed by Suresh Cabrales MD at [address] [facility]  8/13/2021         HX BUNIONECTOMY Left 2008    HX COLONOSCOPY  2009    GI- Dr Marquita Gomez    HX COLONOSCOPY  7/30/12    polyp, irregularities, repeat 3 yrs    HX COLONOSCOPY 09/24/2015    normal - next 2020    HX COLPOSCOPY  2006    HX HEENT  2011    DENTAL IMPLANT    HX KNEE ARTHROSCOPY Left 2013    (torn meniscus)    HX KNEE ARTHROSCOPY Right 3/14    HX KNEE REPLACEMENT Bilateral 04/13/2016    HX KNEE REPLACEMENT Left 01/15/2020    revision    HX ORTHOPAEDIC Left 2015    BUNIONECTOMY LEFT FOOT    HX OTHER SURGICAL Left 5/8/15    LEFT FOOT LAPIDUS ARTHRODESIS, LEFT 2ND, 3RD MET CUNEIFORM FUSION, GASTROCNEMIS RECESSION, STJ ARTHROERESIS (GENERAL WITH POPLITEAL BLOCK)       Family History   Problem Relation Age of Onset    Parkinsonism Father     Hypertension Father     OSTEOARTHRITIS Father     Cancer Father         PROSTATE-TREATED WITH ESTROGEN    Breast Cancer Sister 48    Glaucoma Mother     Heart Disease Mother         multiple MI's in her 52's    Other Sister         partial colon removal    Anesth Problems Neg Hx        Social History     Socioeconomic History    Marital status:      Spouse name: Not on file    Number of children: Not on file    Years of education: Not on file    Highest education level: Not on file   Occupational History    Not on file   Tobacco Use    Smoking status: Never Smoker    Smokeless tobacco: Never Used   Vaping Use    Vaping Use: Never used   Substance and Sexual Activity    Alcohol use:  Yes     Alcohol/week: 7.0 standard drinks     Types: 1 Cans of beer, 6 Standard drinks or equivalent per week     Comment: socially    Drug use: Never    Sexual activity: Yes     Partners: Male     Birth control/protection: None   Other Topics Concern    Not on file   Social History Narrative    ** Merged History Encounter **          Social Determinants of Health     Financial Resource Strain:     Difficulty of Paying Living Expenses: Not on file   Food Insecurity:     Worried About Running Out of Food in the Last Year: Not on file    Power of Food in the Last Year: Not on file   Transportation Needs:     Lack of Transportation (Medical): Not on file    Lack of Transportation (Non-Medical): Not on file   Physical Activity:     Days of Exercise per Week: Not on file    Minutes of Exercise per Session: Not on file   Stress:     Feeling of Stress : Not on file   Social Connections:     Frequency of Communication with Friends and Family: Not on file    Frequency of Social Gatherings with Friends and Family: Not on file    Attends [employer] Services: Not on file    Active Member of [address] [place] [address] or Organizations: Not on file    Attends Club or Organization Meetings: Not on file    Marital Status: Not on file   Intimate Partner Violence:     Fear of Current or Ex-Partner: Not on file    Emotionally Abused: Not on file    Physically Abused: Not on file    Sexually Abused: Not on file   Housing Stability:     Unable to Pay for Housing in the Last Year: Not on file    Number of [place] in the Last Year: Not on file    Unstable Housing in the Last Year: Not on file       Review of Systems    No flowsheet data found. Vitals:  Ht 5' 8\" (1.727 m)   Wt 238 lb (108 kg)   LMP 11/25/2012   BMI 36.19 kg/m²    Body mass index is 36.19 kg/m². An electronic signature was used to authenticate this note.   -- Amol La MD

## 2022-04-06 ENCOUNTER — OFFICE VISIT (OUTPATIENT)
Dept: ORTHOPEDIC SURGERY | Age: 62
End: 2022-04-06
Payer: COMMERCIAL

## 2022-04-06 VITALS — HEIGHT: 68 IN | WEIGHT: 238 LBS | BODY MASS INDEX: 36.07 KG/M2

## 2022-04-06 DIAGNOSIS — S76.312A PARTIAL HAMSTRING TEAR, LEFT, INITIAL ENCOUNTER: Primary | ICD-10-CM

## 2022-04-06 DIAGNOSIS — F11.99 OPIOID USE, UNSPECIFIED WITH UNSPECIFIED OPIOID-INDUCED DISORDER (HCC): ICD-10-CM

## 2022-04-06 DIAGNOSIS — S76.312A HAMSTRING TENDON RUPTURE, LEFT, INITIAL ENCOUNTER: Primary | ICD-10-CM

## 2022-04-06 PROCEDURE — 99204 OFFICE O/P NEW MOD 45 MIN: CPT | Performed by: ORTHOPAEDIC SURGERY

## 2022-04-06 RX ORDER — OXYCODONE AND ACETAMINOPHEN 5; 325 MG/1; MG/1
TABLET ORAL
COMMUNITY
Start: 2022-04-04 | End: 2022-04-12 | Stop reason: CLARIF

## 2022-04-06 RX ORDER — OXYCODONE HYDROCHLORIDE 5 MG/1
5 TABLET ORAL
Qty: 30 TABLET | Refills: 0 | Status: SHIPPED | OUTPATIENT
Start: 2022-04-06 | End: 2022-04-09

## 2022-04-06 RX ORDER — METHOCARBAMOL 500 MG/1
500 TABLET, FILM COATED ORAL 3 TIMES DAILY
Qty: 30 TABLET | Refills: 0 | Status: SHIPPED | OUTPATIENT
Start: 2022-04-06 | End: 2022-05-23

## 2022-04-06 RX ORDER — METHOCARBAMOL 500 MG/1
TABLET, FILM COATED ORAL
COMMUNITY
Start: 2022-04-04 | End: 2022-04-06 | Stop reason: SDUPTHER

## 2022-04-06 NOTE — LETTER
4/6/2022 1:45 PM    Ms[facility] [address] [facility]      Ms. Natasha Haley was seen today by Dr. Ariel Guillermo for a hamstring injury. she has not cleared to work in any capacity as of right now. She will be getting further imaging performed and reevaluated afterwards. If you have any questions or concerns please feel free to call [phone].       Sincerely,      Ariel Guillermo MD

## 2022-04-06 NOTE — LETTER
4/6/2022 1:40 PM    [facility] [address] [facility] [address] [facility]        Ms. Vargas Ott was seen today by Dr. Amol La for a hamstring injury. Please allow her to work from home for the time being. She will be getting further imaging performed and reevaluated afterwards. If you have any questions or concerns please feel free to call [phone].       Sincerely,      Amol La MD

## 2022-04-09 ENCOUNTER — HOSPITAL ENCOUNTER (OUTPATIENT)
Dept: MRI IMAGING | Age: 62
Discharge: HOME | End: 2022-04-09
Attending: ORTHOPAEDIC SURGERY
Payer: COMMERCIAL

## 2022-04-09 DIAGNOSIS — S76.312A PARTIAL HAMSTRING TEAR, LEFT, INITIAL ENCOUNTER: ICD-10-CM

## 2022-04-09 PROCEDURE — 73721 MRI JNT OF LWR EXTRE W/O DYE: CPT

## 2022-04-11 ENCOUNTER — TELEPHONE (OUTPATIENT)
Dept: ORTHOPEDIC SURGERY | Age: 62
End: 2022-04-11

## 2022-04-11 PROBLEM — S76.319A PARTIAL HAMSTRING TEAR: Status: ACTIVE | Noted: 2022-04-11

## 2022-04-11 PROBLEM — F11.99 OPIOID USE, UNSPECIFIED WITH UNSPECIFIED OPIOID-INDUCED DISORDER (HCC): Status: ACTIVE | Noted: 2022-04-06

## 2022-04-12 ENCOUNTER — OFFICE VISIT (OUTPATIENT)
Dept: ORTHOPEDIC SURGERY | Age: 62
End: 2022-04-12
Payer: COMMERCIAL

## 2022-04-12 VITALS — BODY MASS INDEX: 36.07 KG/M2 | WEIGHT: 238 LBS | HEIGHT: 68 IN

## 2022-04-12 DIAGNOSIS — S76.312A PARTIAL HAMSTRING TEAR, LEFT, INITIAL ENCOUNTER: Primary | ICD-10-CM

## 2022-04-12 DIAGNOSIS — E66.01 SEVERE OBESITY (BMI 35.0-39.9) WITH COMORBIDITY (HCC): ICD-10-CM

## 2022-04-12 PROCEDURE — 99214 OFFICE O/P EST MOD 30 MIN: CPT | Performed by: ORTHOPAEDIC SURGERY

## 2022-04-12 RX ORDER — OXYCODONE AND ACETAMINOPHEN 5; 325 MG/1; MG/1
1 TABLET ORAL
Qty: 42 TABLET | Refills: 0 | Status: SHIPPED | OUTPATIENT
Start: 2022-04-12 | End: 2022-04-26

## 2022-04-12 NOTE — PROGRESS NOTES
ASSESSMENT/PLAN:  Below is the assessment and plan developed based on review of pertinent history, physical exam, labs, studies, and medications. 1. Partial hamstring tear, left, initial encounter  -     MRI HIP LT WO CONT; Future  -     methocarbamoL (ROBAXIN) 500 mg tablet; Take 1 Tablet by mouth three (3) times daily. , Normal, Disp-30 Tablet, R-0  2. Opioid use, unspecified with unspecified opioid-induced disorder      Return for Follow-up after diagnostic test.    In discussion with the patient, we considered the numerus possible diagnoses that could be contributing to their present symptoms. We also deliberated on the extensive management options that must be considered to treat their current condition. We reviewed their accessible prior medical records, diagnostic tests, and current health and employment information. We considered how these symptoms were affecting the patient´s activities of daily living as well as employment and fitness activities. The patient had various questions regarding the possible risks, benefits, complications, morbidity and mortality regarding their diagnosis and treatment options. The patients´ comorbidities were considered, and I advocated that they consider maximizing lifestyle modification through nutrition and exercise to aid in addressing their symptoms. Shared decision making yielded an understanding to move forward with conservation treatment preferences. The patient expressed understanding that if conservative management fails to alleviate the present symptoms they will return to office for re-evaluation and consideration of additional diagnostic tests and potential surgical options.      In the interim, we have recommended ice, elevation, and anti-inflammatory medications along with a physician directed home exercise program. We discussed the risks and common side effects of anti-inflammatory medications and instructed the patient to discontinue the medication and contact us if they experienced any side effects. The patient was encouraged to discuss the possible side effects with their family physician or pharmacist prior to initiating any new medications. After a long discussion regarding treatment options, we have decided to prescribe an oral medication. We discussed the risks and common side effects of the medication and instructed the patient to discontinue the medication and contact us if they experienced any side effects. We also encouraged the patient to discuss the possible side effects with their family physician or pharmacist prior to initiating any new medications. We talked about operative and nonoperative treatment of hamstring injuries. Given the fact that she is only injured the semimembranosus. We will start some physical therapy. We will continue her on Percocet. I will see her back in 4 weeks time to evaluate her progress. SUBJECTIVE/OBJECTIVE:  Naseem Alonzo (: 1960) is a 59 y.o. female, patient,here for evaluation of the Leg Pain (left hamstring)  . The patient reports that she was vacationing in [place] this week when she slipped on a puddle and perform the splits. She reports she felt a pop had immediate pain. She was taken to the hospital.  She reports that some bruising. She has been on Percocet and Robaxin. She reports they did a CT scan in the ER. She denies any numbness or tingling. Physical Exam    Examination left proximal hamstring murmur she has limited range of motion. She has ecchymosis posteriorly. She cannot tolerate strength testing. She is tender to palpation posteriorly. She is neurovascular intact distally.     Imaging:    MRI HIP LT WO CONT  Narrative: EXAM: MRI HIP LT WO CONT    INDICATION: Dx: Partial hamstring tear, left, initial encounter [A30.728J  (ICD-10-CM)]    COMPARISON: Bilateral knee radiographs 9/3/2020    TECHNIQUE: Multiplanar, multisequence noncontrast MRI of the pelvis with  attention to the left hip . CONTRAST: None. FINDINGS: Bone marrow: No acute fracture, dislocation, or marrow replacing  process. Partially visualized left total knee arthroplasty    Joint fluid: Small hip joint effusion. Articular cartilage: Not well evaluated. No osteochondral lesion. Hip joint  marginal osteophytes. Acetabular labrum: Not well evaluated. No definite tear. Hip morphology:  Normal concavity of the femoral head-neck junction. No  acetabular overcoverage or retroversion. Muscles and tendons: Rupture of the semimembranosus origin. Approximately 19.1  cm x 8.4 cm x 4.6 cm heterogeneous collection with internal amorphous  debris/blood products within the semimembranosus muscle, favoring a hematoma. Faint feathery intramuscular and perifascial edema involving the obturator  externus and adductor muscles, may reflect low-moderate grade strain. Soft tissues: Few prominent vascular collaterals in the superficial soft tissues  of the anterolateral distal thigh    Intrapelvic soft tissues: Diverticulosis coli. Impression: 1. Rupture of the semimembranosus tendon origin with large intramuscular  complex collection (presumed hematoma). Clinical and/or imaging follow-up to  resolution is recommended. 2.  Suspected low to moderate grade strain involving the obturator externus and  adductor musculature. No Known Allergies    Current Outpatient Medications   Medication Sig    oxyCODONE-acetaminophen (PERCOCET) 5-325 mg per tablet     methocarbamoL (ROBAXIN) 500 mg tablet Take 1 Tablet by mouth three (3) times daily.     PARoxetine (PAXIL) 40 mg tablet TAKE 1 TABLET BY MOUTH ONE TIME A DAY    alendronate (FOSAMAX) 35 mg tablet TAKE 1 TABLET BY MOUTH ONCE WEEKLY BEFORE BREAKFAST WITH A FULL GLASS OF WATER, ON AN EMPTY STOMACH: REMAIN UPRIGHT FOR 30 MINUTES    lisinopriL (PRINIVIL, ZESTRIL) 20 mg tablet TAKE 1 TABLET BY MOUTH ONE TIME A DAY    celecoxib (CELEBREX) 200 mg capsule TAKE 1 CAPSULE BY MOUTH ONE TIME A DAY AS NEEDED FOR PAIN    omeprazole (PRILOSEC) 40 mg capsule Take 1 Capsule by mouth daily.  Cetirizine (ZyrTEC) 10 mg cap Take 10 mg by mouth daily as needed.  varicella-zoster recombinant, PF, (Shingrix, PF,) 50 mcg/0.5 mL susr injection 0.5 ml IM once and then repeat in 2-6 months.  ALPRAZolam (Xanax) 0.5 mg tablet Take 1 Tab by mouth every twelve (12) hours as needed for Anxiety.  acetaminophen (TYLOPHEN) 500 mg capsule Take 1 Cap by mouth every four (4) hours as needed for Pain.  cholecalciferol, vitamin D3, (VITAMIN D3) 2,000 unit tab Take 2,000 Units by mouth daily.  MULTIVITAMIN PO Take 1 Tab by mouth daily. No current facility-administered medications for this visit.        Past Medical History:   Diagnosis Date    Anxiety     Cervical polyp 2014    BENIGN ENDOCERVICAL POLYP    Colon polyps     GERD (gastroesophageal reflux disease)     Hypertension     LGSIL (low grade squamous intraepithelial lesion) on Pap smear 2006    s/p colposcopy, PAP nl since then    Menopausal syndrome (hot flashes) 9/9/2013    Obesity     Osteoarthritis     Osteopenia 9/27/2016    Primary osteoarthritis of knees, bilateral 4/13/2016    Restless leg syndrome 9/9/2013       Past Surgical History:   Procedure Laterality Date    COLONOSCOPY N/A 8/13/2021    COLONOSCOPY performed by Dmitri Larios MD at [address] [place] [address]  8/13/2021         HX BUNIONECTOMY Left 2008    HX COLONOSCOPY  2009    GI- Dr Nicholas Wallace    HX COLONOSCOPY  7/30/12    polyp, irregularities, repeat 3 yrs    HX COLONOSCOPY  09/24/2015    normal - next 2020    HX COLPOSCOPY  2006    HX HEENT  2011    DENTAL IMPLANT    HX KNEE ARTHROSCOPY Left 2013    (torn meniscus)    HX KNEE ARTHROSCOPY Right 3/14    HX KNEE REPLACEMENT Bilateral 04/13/2016    HX KNEE REPLACEMENT Left 01/15/2020    revision    HX ORTHOPAEDIC Left 2015    BUNIONECTOMY LEFT FOOT    HX OTHER SURGICAL Left 5/8/15    LEFT FOOT LAPIDUS ARTHRODESIS, LEFT 2ND, 3RD MET CUNEIFORM FUSION, GASTROCNEMIS RECESSION, STJ ARTHROERESIS (GENERAL WITH POPLITEAL BLOCK)       Family History   Problem Relation Age of Onset   [de-identified] Parkinsonism Father     Hypertension Father     OSTEOARTHRITIS Father     Cancer Father         PROSTATE-TREATED WITH ESTROGEN    Breast Cancer Sister 48    Glaucoma Mother     Heart Disease Mother         multiple MI's in her 52's    Other Sister         partial colon removal    Anesth Problems Neg Hx        Social History     Socioeconomic History    Marital status:      Spouse name: Not on file    Number of children: Not on file    Years of education: Not on file    Highest education level: Not on file   Occupational History    Not on file   Tobacco Use    Smoking status: Never Smoker    Smokeless tobacco: Never Used   Vaping Use    Vaping Use: Never used   Substance and Sexual Activity    Alcohol use: Yes     Alcohol/week: 7.0 standard drinks     Types: 1 Cans of beer, 6 Standard drinks or equivalent per week     Comment: socially    Drug use: Never    Sexual activity: Yes     Partners: Male     Birth control/protection: None   Other Topics Concern    Not on file   Social History Narrative    ** Merged History Encounter **          Social Determinants of Health     Financial Resource Strain:     Difficulty of Paying Living Expenses: Not on file   Food Insecurity:     Worried About Running Out of Food in the Last Year: Not on file    Power of Food in the Last Year: Not on file   Transportation Needs:     Lack of Transportation (Medical): Not on file    Lack of Transportation (Non-Medical):  Not on file   Physical Activity:     Days of Exercise per Week: Not on file    Minutes of Exercise per Session: Not on file   Stress:     Feeling of Stress : Not on file   Social Connections:     Frequency of Communication with Friends and Family: Not on file    Frequency of Social Gatherings with Friends and Family: Not on file    Attends Latter day Services: Not on file    Active Member of Clubs or Organizations: Not on file    Attends Club or Organization Meetings: Not on file    Marital Status: Not on file   Intimate Partner Violence:     Fear of Current or Ex-Partner: Not on file    Emotionally Abused: Not on file    Physically Abused: Not on file    Sexually Abused: Not on file   Housing Stability:     Unable to Pay for Housing in the Last Year: Not on file    Number of [place] in the Last Year: Not on file    Unstable Housing in the Last Year: Not on file       Review of Systems    No flowsheet data found. Vitals:  Ht 5' 8\" (1.727 m)   Wt 238 lb (108 kg)   LMP 11/25/2012   BMI 36.19 kg/m²    Body mass index is 36.19 kg/m². An electronic signature was used to authenticate this note.   -- Amol La MD

## 2022-04-18 ENCOUNTER — DOCUMENTATION ONLY (OUTPATIENT)
Dept: ORTHOPEDIC SURGERY | Age: 62
End: 2022-04-18

## 2022-04-18 ENCOUNTER — OFFICE VISIT (OUTPATIENT)
Dept: ORTHOPEDIC SURGERY | Age: 62
End: 2022-04-18
Payer: COMMERCIAL

## 2022-04-18 DIAGNOSIS — R52 PAIN AGGRAVATED BY ACTIVITIES OF DAILY LIVING: ICD-10-CM

## 2022-04-18 DIAGNOSIS — S76.312D PARTIAL HAMSTRING TEAR, LEFT, SUBSEQUENT ENCOUNTER: Primary | ICD-10-CM

## 2022-04-18 PROCEDURE — 97162 PT EVAL MOD COMPLEX 30 MIN: CPT | Performed by: PHYSICAL THERAPIST

## 2022-04-18 PROCEDURE — 97140 MANUAL THERAPY 1/> REGIONS: CPT | Performed by: PHYSICAL THERAPIST

## 2022-04-18 PROCEDURE — 97110 THERAPEUTIC EXERCISES: CPT | Performed by: PHYSICAL THERAPIST

## 2022-04-18 NOTE — PROGRESS NOTES
Patient Name: Chemo Stage  BOAD:[birth year]  : 1960  [x]  Patient  Verified  Payor: Malika Aponte / Plan: [facility] [address] / Product Type: PPO /    Total Treatment Time (min): 45+  1:1 Treatment Time ( [address] only):    Lois Ponce MD  1. Partial hamstring tear, left, subsequent encounter  2. Pain aggravated by activities of daily living        Subjective:    Patient is a 59 y.o. female referred to physical therapy by Dr. Rima Badillo with a diagnosis of a left partial hamstring tear. She reports injuring her leg when she had a slip and fall injury during the last week of March while on vacation in [place]. She reports at that time she was seen in the emergency room. She did have a CT scan performed in [place] as well. Upon returning home she had an MRI completed which showed a rupture of the semimembranosus. She states she had significant ecchymosis throughout the proximal to mid hamstring near the ischial tuberosity. She does have pictures to document her ecchymosis timeline. She states it has already improved significantly, but she still has noticeable pain. She rates pain varies between 0-4/10. She has pain with pressure of her legs sitting in a chair or car seat. She reports ADL walking and stairs are still limited. 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 She will discuss the option of working partially in the office and from home to break up her shift. She has had both knees replaced in the past with a left knee revision in 2018. She has had left foot and ankle surgery approximately 7 to 8 years ago as well. She has yet to be instructed in any post injury exercises. Past medical history and medication list can otherwise be reviewed per the EHR. Objective:    Patient ambulates in the clinic with slow but fairly nonantalgic gait pattern. She does have some hesitancy with sit to stand transfers. She is tender to palpation just distally to the ischial tuberosity and throughout the proximal third of the hamstring musculature. Seated lower extremity strength testing is 5/5 throughout the entire right lower extremity. Left hamstring is a 4 to 4+/5 when assessed in seated position. Bilateral knee AROM from 0 to 125 degrees. Right hamstring flexibility allows passive flexion to at least 70 degrees. Left hamstring flexibility allows passive hip flexion to approximately 50 degrees with reproduction in pain. Ex: 15 min  Treatment today to include instruction in a home exercise program as well as providing patient with written and visual handouts. Man: 10 min  Myofascial techniques to the proximal hamstring musculature with instructions for IA self completion at home as well    NMR:  min      All questions were addressed. Assessment:    Patient presents with increased pain and decreased ROM, strength, and mobility consistent with proximal left hamstring rupture of the semimembranosus. Long Term Goals. 8 weeks  1. Patient report left hamstring pain to be consistently less than or equal to 1/10 with all home/work/community/recreational ADL activities. Short Term Goals. 2 visits. 1. Patient will demonstrate independence with HEP. Plan:  Plan of care: Physical therapy consisted of frequency of 2/week for the next 6-8 weeks. Physical therapy will consist of therapeutic exercise, modalities, patient education, neuromuscular reeducation, manual therapy, therapeutic activity, dry needling, and instruction in home exercise program as appropriate. Eval  Ex: 15  Man: 10  NMR:     The referring physician has reviewed and approved this evaluation and plan of care as noted by the electronic signature attached to note.     Winston George DPT, ATC

## 2022-04-21 ENCOUNTER — OFFICE VISIT (OUTPATIENT)
Dept: ORTHOPEDIC SURGERY | Age: 62
End: 2022-04-21
Payer: COMMERCIAL

## 2022-04-21 DIAGNOSIS — R52 PAIN AGGRAVATED BY ACTIVITIES OF DAILY LIVING: ICD-10-CM

## 2022-04-21 DIAGNOSIS — S76.312D PARTIAL HAMSTRING TEAR, LEFT, SUBSEQUENT ENCOUNTER: Primary | ICD-10-CM

## 2022-04-21 PROCEDURE — 97112 NEUROMUSCULAR REEDUCATION: CPT | Performed by: PHYSICAL THERAPIST

## 2022-04-21 PROCEDURE — 97140 MANUAL THERAPY 1/> REGIONS: CPT | Performed by: PHYSICAL THERAPIST

## 2022-04-21 PROCEDURE — 97110 THERAPEUTIC EXERCISES: CPT | Performed by: PHYSICAL THERAPIST

## 2022-04-21 NOTE — PROGRESS NOTES
Patient Name: Zenon Quijano  Date:2022  : 1960  [x]  Patient  Verified  Payor: Iona Moody / Plan[facility] [address] / Product Type: PPO /    Total Treatment Time (min): 60  1:1 Treatment Time ( [address] only):   Referring provider: Evelin Caal MD  1. Partial hamstring tear, left, subsequent encounter  2. Pain aggravated by activities of daily living      SUBJECTIVE  Patient admits she was more fatigued than she had expected after initial evaluation. OBJECTIVE  Modality:   []  E-Stim: type _ x _ min     []att   []unatt   []w/US   []w/ice   []w/heat  []  Ultrasound: []cont   []pulse    _ W/cm2 x _  min   []1MHz   []3MHz  []  Ice pack _  Post     declines  [] Hot pack _  Pre       []  Other:    Man: 15 min  Manual and instrument assisted myofascial techniques to the proximal to mid hamstring while patient in prone. Posterior hip mobilization performed in supine. NMR: 15 min  Manual assisted neuromuscular down regulation techniques to the proximal hamstring near the origin at ischial tuberosity. Neuromuscular activities also completed here in clinic today per the exercise log below. Ex: 30 min  Therapeutic exercise/proprioceptive training/neuromuscular reeducation/therapeutic activity completed here in clinic today per the exercise log. Manual assisted passive flexibility exercises for the hamstrings in supine. PT Exercise Log         EXERCISE 2022   Bridge with hip ER green   Standing Ham Curl 2#   Lat Walk green   Slant Board y   Cups (nmr) y   Nu-step 10'                                                                Ex: 30 min  Man: 15 min  NMR: 15 min    ASSESSMENT  [x]  See Plan of Care  [x]  Patient will continue to benefit from skilled therapy to address remaining functional deficits:   Trigger point type restrictions remain throughout the proximal hamstring.   Patient does seem to have better tolerance to passive flexibility exercises here in the clinic today as compared to initial evaluation. Still with subjective pain and soreness limiting ADL activity. PLAN  Continue with current plan of care and progress as appropriate towards functional goals.   [x]  Upgrade activities as tolerated     [x]  Continue plan of care  []  Discharge due to:_  [] Other:_       Lashonda Marrero, PT, DPT  4/21/2022    9:16 AM

## 2022-04-25 NOTE — PROGRESS NOTES
Patient Name: Jonel Momin  Date:2022  : 1960  [x]  Patient  Verified  Payor: Lindsey Salvador / Plan[facility] [address] / Product Type: PPO /    Total Treatment Time (min): 60  1:1 Treatment Time ([facility] only):   Referring provider: Tiana Solano MD  1. Partial hamstring tear, left, subsequent encounter  2. Pain aggravated by activities of daily living      SUBJECTIVE  Still having generalized soreness in same location but doesn't seem quite as intense as last visit. OBJECTIVE  Modality:   []  E-Stim: type _ x _ min     []att   []unatt   []w/US   []w/ice   []w/heat  []  Ultrasound: []cont   []pulse    _ W/cm2 x _  min   []1MHz   []3MHz  []  Ice pack _  Post     declines  [] Hot pack _  Pre       []  Other:    Man: 15 min  Manual and instrument assisted myofascial techniques to the proximal to mid hamstring while patient in prone and distal hamstring in supine. Posterior hip mobilization performed in supine. NMR: 15 min  Manual assisted neuromuscular down regulation techniques to the proximal hamstring near the origin at ischial tuberosity. Neuromuscular activities also completed here in clinic today per the exercise log below. Ex: 30 min  Therapeutic exercise/proprioceptive training/neuromuscular reeducation/therapeutic activity completed here in clinic today per the exercise log. Manual assisted passive flexibility exercises for the hamstrings in supine.                 PT Exercise Log         EXERCISE 2022   Bridge with hip ER green   Standing Ham Curl 2#   Lat Walk green   Slant Board y   Cups (nmr) y   Nu-step 10'   Balance board (nmr) y   A-P Gait green                                                        Ex: 30 min  Man: 15 min  NMR: 15 min    ASSESSMENT  [x]  See Plan of Care  [x]  Patient will continue to benefit from skilled therapy to address remaining functional deficits:     Still with palpable trigger point type restrictions throughout the proximal hamstring near the ischial tuberosity insertion but seems less painful to palpation today as compared to previous weeks. PLAN  Continue with current plan of care and progress as appropriate towards functional goals.   [x]  Upgrade activities as tolerated     [x]  Continue plan of care  []  Discharge due to:_  [] Other:_       Iliana Padgett, PT, DPT  4/26/2022    9:16 AM

## 2022-04-26 ENCOUNTER — OFFICE VISIT (OUTPATIENT)
Dept: ORTHOPEDIC SURGERY | Age: 62
End: 2022-04-26
Payer: COMMERCIAL

## 2022-04-26 DIAGNOSIS — S76.312D PARTIAL HAMSTRING TEAR, LEFT, SUBSEQUENT ENCOUNTER: Primary | ICD-10-CM

## 2022-04-26 DIAGNOSIS — R52 PAIN AGGRAVATED BY ACTIVITIES OF DAILY LIVING: ICD-10-CM

## 2022-04-26 PROCEDURE — 97140 MANUAL THERAPY 1/> REGIONS: CPT | Performed by: PHYSICAL THERAPIST

## 2022-04-26 PROCEDURE — 97112 NEUROMUSCULAR REEDUCATION: CPT | Performed by: PHYSICAL THERAPIST

## 2022-04-26 PROCEDURE — 97110 THERAPEUTIC EXERCISES: CPT | Performed by: PHYSICAL THERAPIST

## 2022-04-28 DIAGNOSIS — M15.9 GENERALIZED OSTEOARTHRITIS: ICD-10-CM

## 2022-04-28 DIAGNOSIS — I10 ESSENTIAL HYPERTENSION WITH GOAL BLOOD PRESSURE LESS THAN 140/90: ICD-10-CM

## 2022-04-28 RX ORDER — LISINOPRIL 20 MG/1
TABLET ORAL
Qty: 90 TABLET | Refills: 0 | Status: SHIPPED | OUTPATIENT
Start: 2022-04-28 | End: 2022-07-25 | Stop reason: SDUPTHER

## 2022-04-28 RX ORDER — CELECOXIB 200 MG/1
CAPSULE ORAL
Qty: 90 CAPSULE | Refills: 0 | Status: SHIPPED | OUTPATIENT
Start: 2022-04-28 | End: 2022-07-25 | Stop reason: SDUPTHER

## 2022-04-29 ENCOUNTER — OFFICE VISIT (OUTPATIENT)
Dept: ORTHOPEDIC SURGERY | Age: 62
End: 2022-04-29
Payer: COMMERCIAL

## 2022-04-29 DIAGNOSIS — R52 PAIN AGGRAVATED BY ACTIVITIES OF DAILY LIVING: ICD-10-CM

## 2022-04-29 DIAGNOSIS — S76.312D PARTIAL HAMSTRING TEAR, LEFT, SUBSEQUENT ENCOUNTER: Primary | ICD-10-CM

## 2022-04-29 PROCEDURE — 97112 NEUROMUSCULAR REEDUCATION: CPT | Performed by: PHYSICAL THERAPIST

## 2022-04-29 PROCEDURE — 97140 MANUAL THERAPY 1/> REGIONS: CPT | Performed by: PHYSICAL THERAPIST

## 2022-04-29 PROCEDURE — 97110 THERAPEUTIC EXERCISES: CPT | Performed by: PHYSICAL THERAPIST

## 2022-04-29 NOTE — PROGRESS NOTES
ASSESSMENT/PLAN:  Below is the assessment and plan developed based on review of pertinent history, physical exam, labs, studies, and medications. 1. Partial hamstring tear, left, initial encounter  -     MRI HIP LT WO CONT; Future  -     methocarbamoL (ROBAXIN) 500 mg tablet; Take 1 Tablet by mouth three (3) times daily. , Normal, Disp-30 Tablet, R-0  2. Opioid use, unspecified with unspecified opioid-induced disorder      In discussion with the patient, we considered the numerus possible diagnoses that could be contributing to their present symptoms. We also deliberated on the extensive management options that must be considered to treat their current condition. We reviewed their accessible prior medical records, diagnostic tests, and current health and employment information. We considered how these symptoms were affecting the patient´s activities of daily living as well as employment and fitness activities. The patient had various questions regarding the possible risks, benefits, complications, morbidity and mortality regarding their diagnosis and treatment options. The patients´ comorbidities were considered, and I advocated that they consider maximizing lifestyle modification through nutrition and exercise to aid in addressing their symptoms. Shared decision making yielded an understanding to move forward with conservation treatment preferences. The patient expressed understanding that if conservative management fails to alleviate the present symptoms they will return to office for re-evaluation and consideration of additional diagnostic tests and potential surgical options. We will have her continue with physical therapy to continue to work on strengthening hamstring tendons. She is given a new prescription in the office today. Encouraged her to continue use ice as needed. She can gradually increase her activities as tolerated.   We will plan to see her back in 6 weeks time if she has any issues. SUBJECTIVE/OBJECTIVE:  Les Rodriguez (: 1960) is a 59 y.o. female, patient,here for evaluation of the Leg Pain (left hamstring)  . Patient presents today for reevaluation of her left proximal hamstring semimembranosus rupture. States that overall she is doing really well. She continues to make progress with physical therapy. Her pain is improving. She notices the most discomfort when she is sitting. Overall she is happy with her progress up to this point. Physical Exam    Examination left proximal hamstring reveals no palpable defect. She has no significant tenderness to palpation over the ischial tuberosity. She has excellent strength with knee flexion. She has full range of motion of the hip and the knee. Sensation is intact to light touch distally in the leg and the foot. Imaging:    MRI HIP LT WO CONT  Narrative: EXAM: MRI HIP LT WO CONT    INDICATION: Dx: Partial hamstring tear, left, initial encounter [J44.713U  (ICD-10-CM)]    COMPARISON: Bilateral knee radiographs 9/3/2020    TECHNIQUE: Multiplanar, multisequence noncontrast MRI of the pelvis with  attention to the left hip . CONTRAST: None. FINDINGS: Bone marrow: No acute fracture, dislocation, or marrow replacing  process. Partially visualized left total knee arthroplasty    Joint fluid: Small hip joint effusion. Articular cartilage: Not well evaluated. No osteochondral lesion. Hip joint  marginal osteophytes. Acetabular labrum: Not well evaluated. No definite tear. Hip morphology:  Normal concavity of the femoral head-neck junction. No  acetabular overcoverage or retroversion. Muscles and tendons: Rupture of the semimembranosus origin. Approximately 19.1  cm x 8.4 cm x 4.6 cm heterogeneous collection with internal amorphous  debris/blood products within the semimembranosus muscle, favoring a hematoma.   Faint feathery intramuscular and perifascial edema involving the obturator  externus and adductor muscles, may reflect low-moderate grade strain. Soft tissues: Few prominent vascular collaterals in the superficial soft tissues  of the anterolateral distal thigh    Intrapelvic soft tissues: Diverticulosis coli. Impression: 1. Rupture of the semimembranosus tendon origin with large intramuscular  complex collection (presumed hematoma). Clinical and/or imaging follow-up to  resolution is recommended. 2.  Suspected low to moderate grade strain involving the obturator externus and  adductor musculature. No Known Allergies    Current Outpatient Medications   Medication Sig    oxyCODONE-acetaminophen (PERCOCET) 5-325 mg per tablet     methocarbamoL (ROBAXIN) 500 mg tablet Take 1 Tablet by mouth three (3) times daily.  PARoxetine (PAXIL) 40 mg tablet TAKE 1 TABLET BY MOUTH ONE TIME A DAY    alendronate (FOSAMAX) 35 mg tablet TAKE 1 TABLET BY MOUTH ONCE WEEKLY BEFORE BREAKFAST WITH A FULL GLASS OF WATER, ON AN EMPTY STOMACH: REMAIN UPRIGHT FOR 30 MINUTES    lisinopriL (PRINIVIL, ZESTRIL) 20 mg tablet TAKE 1 TABLET BY MOUTH ONE TIME A DAY    celecoxib (CELEBREX) 200 mg capsule TAKE 1 CAPSULE BY MOUTH ONE TIME A DAY AS NEEDED FOR PAIN    omeprazole (PRILOSEC) 40 mg capsule Take 1 Capsule by mouth daily.  Cetirizine (ZyrTEC) 10 mg cap Take 10 mg by mouth daily as needed.  varicella-zoster recombinant, PF, (Shingrix, PF,) 50 mcg/0.5 mL susr injection 0.5 ml IM once and then repeat in 2-6 months.  ALPRAZolam (Xanax) 0.5 mg tablet Take 1 Tab by mouth every twelve (12) hours as needed for Anxiety.  acetaminophen (TYLOPHEN) 500 mg capsule Take 1 Cap by mouth every four (4) hours as needed for Pain.  cholecalciferol, vitamin D3, (VITAMIN D3) 2,000 unit tab Take 2,000 Units by mouth daily.  MULTIVITAMIN PO Take 1 Tab by mouth daily. No current facility-administered medications for this visit.        Past Medical History:   Diagnosis Date    Anxiety     Cervical polyp 2014 BENIGN ENDOCERVICAL POLYP    Colon polyps     GERD (gastroesophageal reflux disease)     Hypertension     LGSIL (low grade squamous intraepithelial lesion) on Pap smear 2006    s/p colposcopy, PAP nl since then    Menopausal syndrome (hot flashes) 9/9/2013    Obesity     Osteoarthritis     Osteopenia 9/27/2016    Primary osteoarthritis of knees, bilateral 4/13/2016    Restless leg syndrome 9/9/2013       Past Surgical History:   Procedure Laterality Date    COLONOSCOPY N/A 8/13/2021    COLONOSCOPY performed by Krissy Bailey MD at [facility] [address]  8/13/2021         HX BUNIONECTOMY Left 2008    HX COLONOSCOPY  2009    GI- Dr Torres [facility]    HX COLONOSCOPY  7/30/12    polyp, irregularities, repeat 3 yrs    HX COLONOSCOPY  09/24/2015    normal - next 2020    HX COLPOSCOPY  2006    HX HEENT  2011    DENTAL IMPLANT    HX KNEE ARTHROSCOPY Left 2013    (torn meniscus)    HX KNEE ARTHROSCOPY Right 3/14    HX KNEE REPLACEMENT Bilateral 04/13/2016    HX KNEE REPLACEMENT Left 01/15/2020    revision    HX ORTHOPAEDIC Left 2015    BUNIONECTOMY LEFT FOOT    HX OTHER SURGICAL Left 5/8/15    LEFT FOOT LAPIDUS ARTHRODESIS, LEFT 2ND, 3RD MET CUNEIFORM FUSION, GASTROCNEMIS RECESSION, STJ ARTHROERESIS (GENERAL WITH POPLITEAL BLOCK)       Family History   Problem Relation Age of Onset   Larance Salinas Parkinsonism Father     Hypertension Father     OSTEOARTHRITIS Father     Cancer Father         PROSTATE-TREATED WITH ESTROGEN    Breast Cancer Sister 48    Glaucoma Mother     Heart Disease Mother         multiple MI's in her 52's    Other Sister         partial colon removal    Anesth Problems Neg Hx        Social History     Socioeconomic History    Marital status:      Spouse name: Not on file    Number of children: Not on file    Years of education: Not on file    Highest education level: Not on file   Occupational History    Not on file   Tobacco Use    Smoking status: Never Smoker    Smokeless tobacco: Never Used   Vaping Use    Vaping Use: Never used   Substance and Sexual Activity    Alcohol use: Yes     Alcohol/week: 7.0 standard drinks     Types: 1 Cans of beer, 6 Standard drinks or equivalent per week     Comment: socially    Drug use: Never    Sexual activity: Yes     Partners: Male     Birth control/protection: None   Other Topics Concern    Not on file   Social History Narrative    ** Merged History Encounter **          Social Determinants of Health     Financial Resource Strain:     Difficulty of Paying Living Expenses: Not on file   Food Insecurity:     Worried About Running Out of Food in the Last Year: Not on file    Power of Food in the Last Year: Not on file   Transportation Needs:     Lack of Transportation (Medical): Not on file    Lack of Transportation (Non-Medical): Not on file   Physical Activity:     Days of Exercise per Week: Not on file    Minutes of Exercise per Session: Not on file   Stress:     Feeling of Stress : Not on file   Social Connections:     Frequency of Communication with Friends and Family: Not on file    Frequency of Social Gatherings with Friends and Family: Not on file    Attends Latter-day Services: Not on file    Active Member of [address] [place] [address] or Organizations: Not on file    Attends Club or Organization Meetings: Not on file    Marital Status: Not on file   Intimate Partner Violence:     Fear of Current or Ex-Partner: Not on file    Emotionally Abused: Not on file    Physically Abused: Not on file    Sexually Abused: Not on file   Housing Stability:     Unable to Pay for Housing in the Last Year: Not on file    Number of [place] in the Last Year: Not on file    Unstable Housing in the Last Year: Not on file       Review of Systems    No flowsheet data found. Vitals:  Ht 5' 8\" (1.727 m)   Wt 238 lb (108 kg)   LMP 11/25/2012   BMI 36.19 kg/m²    Body mass index is 36.19 kg/m².           An electronic signature was used to authenticate this note.   -- Sandy Gonzalez MD

## 2022-04-29 NOTE — TELEPHONE ENCOUNTER
Future Appointments   Date Time Provider Josué Colin   4/29/2022  3:20 PM Mary Gilliam PT, DPT [facility] AMB   5/2/2022 10:30 AM MD NILO Sandra BS AMB   5/3/2022  4:00 PM Gal Hussein, PT, DPT [facility] AMB   5/6/2022  3:20 PM Gal Hussein, PT, DPT [facility] AMB   5/23/2022  2:30 PM MD ANILA Gillespie BS AMB

## 2022-04-29 NOTE — PROGRESS NOTES
Patient Name: Katie Rico  Date:2022  : 1960  [x]  Patient  Verified  Payor: Patrick Haimshayne / Plan[facility] [address] / Product Type: PPO /    Total Treatment Time (min): 60  1:1 Treatment Time ( [address] only):   Referring provider: Charissa Negron MD  1. Partial hamstring tear, left, subsequent encounter  2. Pain aggravated by activities of daily living  3. Partial hamstring tear, left, initial encounter      SUBJECTIVE  Patient is to see physician on Monday. States that she still has 2-3/10 pain however she would like to try return to work as at least part of her time can be spent sitting and part in standing. OBJECTIVE  Modality:   []  E-Stim: type _ x _ min     []att   []unatt   []w/US   []w/ice   []w/heat  []  Ultrasound: []cont   []pulse    _ W/cm2 x _  min   []1MHz   []3MHz  []  Ice pack _  Post     declines  [] Hot pack _  Pre       []  Other:    Man: 15 min  Manual and instrument assisted myofascial techniques to the proximal to mid hamstring while patient in prone and distal hamstring in supine. Posterior hip mobilization performed in supine. NMR: 15 min  Manual assisted neuromuscular down regulation techniques to the proximal hamstring near the origin at ischial tuberosity. Neuromuscular activities also completed here in clinic today per the exercise log below. Ex: 30 min  Therapeutic exercise/proprioceptive training/neuromuscular reeducation/therapeutic activity completed here in clinic today per the exercise log. Manual assisted passive flexibility exercises for the hamstrings in supine.                 PT Exercise Log         EXERCISE 2022   Bridge with hip ER green   Standing Ham Curl 2#   Lat Walk green   Slant Board y   Cups (nmr) y   Nu-step 10'   Balance board (nmr) y   A-P Gait green   TG mini squat Level 18                                                    Ex: 30 min  Man: 15 min  NMR: 15 min    ASSESSMENT  [x]  See Plan of Care  [x]  Patient will continue to benefit from skilled therapy to address remaining functional deficits:     No specific complaints with standing exercise or mobility here in the clinic. Still with subjective symptoms when in sitting but improved on a 0-to-10 scale as compared to initial evaluation. PLAN  Continue with current plan of care and progress as appropriate towards functional goals.   [x]  Upgrade activities as tolerated     [x]  Continue plan of care  []  Discharge due to:_  [] Other:_       Mindy Lazo, PT, DPT  4/29/2022    9:16 AM

## 2022-05-02 ENCOUNTER — OFFICE VISIT (OUTPATIENT)
Dept: ORTHOPEDIC SURGERY | Age: 62
End: 2022-05-02
Payer: COMMERCIAL

## 2022-05-02 VITALS — WEIGHT: 238 LBS | BODY MASS INDEX: 36.07 KG/M2 | HEIGHT: 68 IN

## 2022-05-02 DIAGNOSIS — S76.312D LEFT PROXIMAL HAMSTRING TENDON RUPTURE, SUBSEQUENT ENCOUNTER: Primary | ICD-10-CM

## 2022-05-02 PROCEDURE — 99213 OFFICE O/P EST LOW 20 MIN: CPT | Performed by: ORTHOPAEDIC SURGERY

## 2022-05-02 NOTE — LETTER
NOTIFICATION RETURN TO WORK / SCHOOL    5/2/2022 10:59 AM    Ms. [address] [facility] [address] [place] [address]      To Whom It May Concern:    Jose Stratton is currently under the care of [facility]. Please allow her to return to work full duty on 5/9/2022. If there are questions or concerns please have the patient contact our office.         Sincerely,      Jorge A Jenkins MD

## 2022-05-03 ENCOUNTER — OFFICE VISIT (OUTPATIENT)
Dept: ORTHOPEDIC SURGERY | Age: 62
End: 2022-05-03
Payer: COMMERCIAL

## 2022-05-03 DIAGNOSIS — S76.312D PARTIAL HAMSTRING TEAR, LEFT, SUBSEQUENT ENCOUNTER: Primary | ICD-10-CM

## 2022-05-03 DIAGNOSIS — R52 PAIN AGGRAVATED BY ACTIVITIES OF DAILY LIVING: ICD-10-CM

## 2022-05-03 PROCEDURE — 97110 THERAPEUTIC EXERCISES: CPT | Performed by: PHYSICAL THERAPIST

## 2022-05-03 PROCEDURE — 97140 MANUAL THERAPY 1/> REGIONS: CPT | Performed by: PHYSICAL THERAPIST

## 2022-05-03 PROCEDURE — 97112 NEUROMUSCULAR REEDUCATION: CPT | Performed by: PHYSICAL THERAPIST

## 2022-05-03 NOTE — PROGRESS NOTES
Patient Name: Katie Rico  [id number]  : 1960  [x]  Patient  Verified  Payor: Patrick Gaurang / Plan: [facility] [address] / Product Type: PPO /    Total Treatment Time (min): 60  1:1 Treatment Time ( [address] only):   Referring provider: Charissa Negron MD  1. Partial hamstring tear, left, subsequent encounter  2. Pain aggravated by activities of daily living      SUBJECTIVE  MD approves continued PT. Still sore but states she feels ready for return to work. OBJECTIVE  Modality:   []  E-Stim: type _ x _ min     []att   []unatt   []w/US   []w/ice   []w/heat  []  Ultrasound: []cont   []pulse    _ W/cm2 x _  min   []1MHz   []3MHz  []  Ice pack _  Post     declines  [] Hot pack _  Pre       []  Other:    Man: 15 min  Manual and instrument assisted myofascial techniques to the proximal to mid hamstring while patient in prone. Posterior hip mobilization performed in supine. NMR: 15 min  Manual assisted neuromuscular down regulation techniques to the proximal hamstring near the origin at ischial tuberosity. Neuromuscular activities also completed here in clinic today per the exercise log below. Ex: 30 min  Therapeutic exercise/proprioceptive training/neuromuscular reeducation/therapeutic activity completed here in clinic today per the exercise log. Manual assisted passive flexibility exercises for the hamstrings in supine. PT Exercise Log         EXERCISE 5/3/2022   Bridge with hip ER green   Standing Ham Curl 2#   Lat Walk green   Slant Board y   Cups (nmr) y   Nu-step 10' level 2.5   Balance board (nmr) y   4way Gait blue   TG mini squat Level 18   Abc's with ball y                                                Ex: 30 min  Man: 15 min  NMR: 15 min    ASSESSMENT  [x]  See Plan of Care  [x]  Patient will continue to benefit from skilled therapy to address remaining functional deficits:     Still with palpable tenderness but becoming less widespread.  Patient improving ADL tolerance based on subjective reports. PLAN  Continue with current plan of care and progress as appropriate towards functional goals.   [x]  Upgrade activities as tolerated     [x]  Continue plan of care  []  Discharge due to:_  [] Other:_       Lisette Caicedo PT, DPT  5/3/2022    9:16 AM

## 2022-05-05 ENCOUNTER — DOCUMENTATION ONLY (OUTPATIENT)
Dept: ORTHOPEDIC SURGERY | Age: 62
End: 2022-05-05

## 2022-05-06 ENCOUNTER — OFFICE VISIT (OUTPATIENT)
Dept: ORTHOPEDIC SURGERY | Age: 62
End: 2022-05-06
Payer: COMMERCIAL

## 2022-05-06 DIAGNOSIS — S76.312D PARTIAL HAMSTRING TEAR, LEFT, SUBSEQUENT ENCOUNTER: Primary | ICD-10-CM

## 2022-05-06 DIAGNOSIS — R52 PAIN AGGRAVATED BY ACTIVITIES OF DAILY LIVING: ICD-10-CM

## 2022-05-06 PROCEDURE — 97112 NEUROMUSCULAR REEDUCATION: CPT | Performed by: PHYSICAL THERAPIST

## 2022-05-06 PROCEDURE — 97140 MANUAL THERAPY 1/> REGIONS: CPT | Performed by: PHYSICAL THERAPIST

## 2022-05-06 PROCEDURE — 97110 THERAPEUTIC EXERCISES: CPT | Performed by: PHYSICAL THERAPIST

## 2022-05-06 NOTE — PROGRESS NOTES
Patient Name: Katie Rico  Date:2022  : 1960  [x]  Patient  Verified  Payor: Patrick Gaurang / [facility] [address] / Product Type: PPO /    Total Treatment Time (min): 60  1:1 Treatment Time ( [address] only):   Referring provider: Charissa Negron MD  1. Partial hamstring tear, left, subsequent encounter  2. Pain aggravated by activities of daily living      SUBJECTIVE  No specific changes or complaints. Sitting remains her primary limitation but feels this is improving. Scheduled to return to work next week. OBJECTIVE  Modality:   []  E-Stim: type _ x _ min     []att   []unatt   []w/US   []w/ice   []w/heat  []  Ultrasound: []cont   []pulse    _ W/cm2 x _  min   []1MHz   []3MHz  []  Ice pack _  Post     declines  [] Hot pack _  Pre       []  Other:    Man: 15 min  Manual and instrument assisted myofascial techniques to the proximal to mid hamstring while patient in prone. Posterior hip mobilization performed in supine. NMR: 15 min  Manual assisted neuromuscular down regulation techniques to the proximal hamstring near the origin at ischial tuberosity. Neuromuscular activities also completed here in clinic today per the exercise log below. Ex: 30 min  Therapeutic exercise/proprioceptive training/neuromuscular reeducation/therapeutic activity completed here in clinic today per the exercise log. Manual assisted passive flexibility exercises for the hamstrings in supine.                 PT Exercise Log         EXERCISE 2022   Bridge with hip ER green   Standing Ham Curl 5#   Lat Walk green   Slant Board y   Cups (nmr) y   Nu-step 10' level 2.5   Balance board (nmr) y   4way Gait blue   TG mini squat Level 20   Abc's with ball y   Modified single leg RDL y - modified                                            Ex: 30 min  Man: 15 min  NMR: 15 min    ASSESSMENT  [x]  See Plan of Care  [x]  Patient will continue to benefit from skilled therapy to address remaining functional deficits: Appropriate fatigue with advancing exercise here in clinic today. Still has some difficulty with single limb stance tasks but overall seems to be improving utilizing current plan of care. PLAN  Continue with current plan of care and progress as appropriate towards functional goals.   [x]  Upgrade activities as tolerated     [x]  Continue plan of care  []  Discharge due to:_  [] Other:_       Lisa Wang, PT, DPT  5/6/2022    9:16 AM

## 2022-05-09 ENCOUNTER — OFFICE VISIT (OUTPATIENT)
Dept: ORTHOPEDIC SURGERY | Age: 62
End: 2022-05-09
Payer: COMMERCIAL

## 2022-05-09 DIAGNOSIS — S76.312D PARTIAL HAMSTRING TEAR, LEFT, SUBSEQUENT ENCOUNTER: Primary | ICD-10-CM

## 2022-05-09 DIAGNOSIS — S76.312D LEFT PROXIMAL HAMSTRING TENDON RUPTURE, SUBSEQUENT ENCOUNTER: ICD-10-CM

## 2022-05-09 DIAGNOSIS — R52 PAIN AGGRAVATED BY ACTIVITIES OF DAILY LIVING: ICD-10-CM

## 2022-05-09 PROCEDURE — 97110 THERAPEUTIC EXERCISES: CPT

## 2022-05-09 PROCEDURE — 97112 NEUROMUSCULAR REEDUCATION: CPT

## 2022-05-09 PROCEDURE — 97140 MANUAL THERAPY 1/> REGIONS: CPT

## 2022-05-09 NOTE — PROGRESS NOTES
Patient Name: Katie Rico  Date:2022  : 1960  [x]  Patient  Verified  Payor: Patrick Haimshayne / Plan: [facility] [address] / Product Type: PPO /    Total Treatment Time (min): 60  1:1 Treatment Time ( [address] only):   Referring provider: Charissa Negron MD  1. Partial hamstring tear, left, subsequent encounter  2. Pain aggravated by activities of daily living  3. Left proximal hamstring tendon rupture, subsequent encounter      SUBJECTIVE  Patient reports her leg felt good with RTW today, even while spending most of the day seated. She states she still has difficulty going up steps on the L.     OBJECTIVE  Modality:   []  E-Stim: type _ x _ min     []att   []unatt   []w/US   []w/ice   []w/heat  []  Ultrasound: []cont   []pulse    _ W/cm2 x _  min   []1MHz   []3MHz  []  Ice pack _  Post     declines  [] Hot pack _  Pre       []  Other:    Man: 15 min  Manual and instrument assisted myofascial techniques to the proximal to mid hamstring while patient in prone. Posterior hip mobilization performed in supine. NMR: 15 min  Manual assisted neuromuscular down regulation techniques to the proximal hamstring near the origin at ischial tuberosity. Neuromuscular activities also completed here in clinic today per the exercise log below. Ex: 30 min  Therapeutic exercise/proprioceptive training/neuromuscular reeducation/therapeutic activity completed here in clinic today per the exercise log. Manual assisted passive flexibility exercises for the hamstrings in supine.                 PT Exercise Log         EXERCISE 2022   Bridge with hip ER green   Standing Ham Curl 5#   Lat Walk green   Slant Board y   Cups (nmr) y   Nu-step 10' level 2.5   Balance board (nmr) y   4way Gait blue   TG mini squat Level 20   Abc's with ball y   Modified single leg RDL y - modified                                            Ex: 30 min  Man: 15 min  NMR: 15 min    ASSESSMENT  [x]  See Plan of Care  [x]  Patient will continue to benefit from skilled therapy to address remaining functional deficits:     Still with some TTP proximal to ischial tuberosity but pain levels have improved significantly with PT. Continues to benefit from skilled interventions to improve stability/strength for functional balance and step up tasks. PLAN  Continue with current plan of care and progress as appropriate towards functional goals.   [x]  Upgrade activities as tolerated     [x]  Continue plan of care  []  Discharge due to:_  [] Other:_       Monia Osler, TARIK  5/9/2022    9:16 AM

## 2022-05-11 ENCOUNTER — TELEPHONE (OUTPATIENT)
Dept: ORTHOPEDIC SURGERY | Age: 62
End: 2022-05-11

## 2022-05-11 NOTE — TELEPHONE ENCOUNTER
Rcvd message in patient portal about RTW form possibly having wrong date. I called patient to advise I did fill out with a RTW date of 5/9/22. Patient asked me to fax her a copy to [phone]. I faxed to patient and refaxed to [employer].

## 2022-05-12 ENCOUNTER — OFFICE VISIT (OUTPATIENT)
Dept: ORTHOPEDIC SURGERY | Age: 62
End: 2022-05-12
Payer: COMMERCIAL

## 2022-05-12 DIAGNOSIS — S76.312D PARTIAL HAMSTRING TEAR, LEFT, SUBSEQUENT ENCOUNTER: Primary | ICD-10-CM

## 2022-05-12 DIAGNOSIS — R52 PAIN AGGRAVATED BY ACTIVITIES OF DAILY LIVING: ICD-10-CM

## 2022-05-12 DIAGNOSIS — S76.312D LEFT PROXIMAL HAMSTRING TENDON RUPTURE, SUBSEQUENT ENCOUNTER: ICD-10-CM

## 2022-05-12 PROCEDURE — 97112 NEUROMUSCULAR REEDUCATION: CPT

## 2022-05-12 PROCEDURE — 97110 THERAPEUTIC EXERCISES: CPT

## 2022-05-12 PROCEDURE — 97140 MANUAL THERAPY 1/> REGIONS: CPT

## 2022-05-12 NOTE — PROGRESS NOTES
Patient Name: Zohaib Bishop  NIAS:  : 1960  [x]  Patient  Verified  Payor: Venita Chance / Plan: [facility] [address] / Product Type: PPO /    Total Treatment Time (min): 60  1:1 Treatment Time ( [address] only):   Referring provider: Terence Ruff MD  1. Partial hamstring tear, left, subsequent encounter  2. Pain aggravated by activities of daily living  3. Left proximal hamstring tendon rupture, subsequent encounter      SUBJECTIVE    Patient continues to reports no aggravation of symptoms since return to work but still feels weak with functional step-up. OBJECTIVE  Modality:   []  E-Stim: type _ x _ min     []att   []unatt   []w/US   []w/ice   []w/heat  []  Ultrasound: []cont   []pulse    _ W/cm2 x _  min   []1MHz   []3MHz  []  Ice pack _  Post     declines  [] Hot pack _  Pre       []  Other:    Man: 15 min  Manual and instrument assisted myofascial techniques to the proximal to mid hamstring while patient in prone. Posterior hip mobilization performed in supine. NMR: 15 min  Manual assisted neuromuscular down regulation techniques to the proximal hamstring near the origin at ischial tuberosity. Neuromuscular activities also completed here in clinic today per the exercise log below. Ex: 30 min  Therapeutic exercise/proprioceptive training/neuromuscular reeducation/therapeutic activity completed here in clinic today per the exercise log. Manual assisted passive flexibility exercises for the iliopsoas/addcutors/hamstrings in supine.                 PT Exercise Log         EXERCISE 2022   Bridge with hip ER green   Standing Ham Curl 5#   Lat Walk green   Slant Board y   Cups (nmr) y   Nu-step 10' level 2.5   Balance board (nmr) y   4way Gait blue   TG mini squat Level 20/Lv 12   Abc's with ball y   Modified single leg RDL y - modified   BOW squats x                                        Ex: 30 min  Man: 15 min  NMR: 15 min    ASSESSMENT  [x]  See Plan of Care  [x]  Patient will continue to benefit from skilled therapy to address remaining functional deficits:     Non-tender at hamstring origin though still with some increased muscle turgor. Continues to benefit from skilled interventions to address functional weakness for step-up activities. PLAN  Continue with current plan of care and progress as appropriate towards functional goals.   [x]  Upgrade activities as tolerated     [x]  Continue plan of care  []  Discharge due to:_  [] Other:_       Piter Reese, PTA  5/12/2022    9:16 AM

## 2022-05-12 NOTE — PROGRESS NOTES
I have reviewed the notes, assessments, and/or procedures performed by Leobardo Snow PTA, I concur with her/his documentation of Tony Kim.

## 2022-05-20 ENCOUNTER — OFFICE VISIT (OUTPATIENT)
Dept: ORTHOPEDIC SURGERY | Age: 62
End: 2022-05-20
Payer: COMMERCIAL

## 2022-05-20 VITALS — WEIGHT: 235 LBS | HEIGHT: 68 IN | BODY MASS INDEX: 35.61 KG/M2

## 2022-05-20 DIAGNOSIS — Z96.652 PAIN DUE TO TOTAL LEFT KNEE REPLACEMENT, INITIAL ENCOUNTER (HCC): Primary | ICD-10-CM

## 2022-05-20 DIAGNOSIS — Z96.652 STATUS POST LEFT KNEE REPLACEMENT: ICD-10-CM

## 2022-05-20 DIAGNOSIS — T84.84XA PAIN DUE TO TOTAL LEFT KNEE REPLACEMENT, INITIAL ENCOUNTER (HCC): Primary | ICD-10-CM

## 2022-05-20 PROCEDURE — 99214 OFFICE O/P EST MOD 30 MIN: CPT | Performed by: ORTHOPAEDIC SURGERY

## 2022-05-20 RX ORDER — PREDNISONE 5 MG/1
TABLET ORAL
Qty: 48 TABLET | Refills: 0 | Status: SHIPPED | OUTPATIENT
Start: 2022-05-20

## 2022-05-20 NOTE — PROGRESS NOTES
Britt Jack (: 1960) is a 59 y.o. female, patient, here for evaluation of the following chief complaint(s):  Knee Pain (rev LTK)       SUBJECTIVE/OBJECTIVE:  Britt Jack presents today complaining of pain adjacent to her revision left total knee replacement. I revised her left knee just over 2 years ago for loosening of tibial component. She slipped and did a split about 7 weeks ago, rupturing her left hamstring. She has been going through physical therapy for that, with much improved symptoms. She does not recall having any knee pain associated with that incident. Symptoms started about 1-1/2 weeks ago without trauma or event. Pain is anterior, in the region of the tibial tubercle. Predominantly activity related, get have aching at rest after she has been up getting around. Occasional wakening night pain. Chronically she relates an occasional incident where she feels the knee is going to give way. No acute change with that. PHYSICAL EXAM:  Vitals: Ht 5' 8\" (1.727 m)   Wt 235 lb (106.6 kg)   LMP 2012   BMI 35.73 kg/m²   Body mass index is 35.73 kg/m². 59y.o. year old F, no distress. Ambulates without a limp. Pain-free motion left hip. Negative Stinchfield. Left total knee is in neutral alignment. Healed midline anterior scar. No warmth swelling or knee effusion. No tenderness at the joint line and around the tibial tubercle and patellar tendon. No pain with resisted active knee extension. Resisted knee flexion actually reproduces the pain in the area of the tibial tubercle. Motion is 0 to approximately 120 degrees. 1+ coronal plane laxity throughout arc of motion. Symmetrical palpable distal pulses. No gross motor or sensory deficits in lower extremities. No distal edema.     IMAGING:  Radiographs: XR Results (most recent):  Results from Appointment encounter on 22    XR KNEE LT 3 V    Narrative  3 x-ray views of left knee including AP, lateral, sunrise demonstrate satisfactory position alignment of cemented revision total knee components. Patella tracks centrally. No evidence of loosening. There are some stress shielding and resorption of bone under the medial tray. No change from appearance compared to 1 year postoperative x-rays. No change in position or alignment. ASSESSMENT/PLAN:  1. Pain due to total left knee replacement, initial encounter ([facility])  -     XR KNEE LT 3 V; Future  -     predniSONE (STERAPRED) 5 mg dose pack; See administration instruction per 5mg dose pack (12 days), Normal, Disp-48 Tablet, R-0  2. Status post left knee replacement    The xray and exam findings were discussed with the patient today. No concerning findings on physical and x-ray exam.  No knee effusion. We will try an oral steroid taper for some acute relief. She will hold her Celebrex while on the steroid. If symptoms do not improve she will return for repeat evaluation. Otherwise return for routine long-term x-ray follow-up of both total knees. No follow-ups on file. Review Of Systems  ROS     Positive for: Musculoskeletal    Last edited by Royal Clarke RN on 5/20/2022  8:52 AM. (History)         Patient denies any recent fever, chills, nausea, vomiting, chest pain, or shortness of breath. No Known Allergies    Current Outpatient Medications   Medication Sig    predniSONE (STERAPRED) 5 mg dose pack See administration instruction per 5mg dose pack (12 days)    lisinopriL (PRINIVIL, ZESTRIL) 20 mg tablet TAKE 1 TABLET BY MOUTH ONE TIME A DAY    celecoxib (CELEBREX) 200 mg capsule TAKE 1 CAPSULE BY MOUTH ONE TIME A DAY AS NEEDED FOR PAIN    methocarbamoL (ROBAXIN) 500 mg tablet Take 1 Tablet by mouth three (3) times daily.     PARoxetine (PAXIL) 40 mg tablet TAKE 1 TABLET BY MOUTH ONE TIME A DAY    alendronate (FOSAMAX) 35 mg tablet TAKE 1 TABLET BY MOUTH ONCE WEEKLY BEFORE BREAKFAST WITH A FULL GLASS OF WATER, ON AN EMPTY STOMACH: REMAIN UPRIGHT FOR 30 MINUTES    omeprazole (PRILOSEC) 40 mg capsule Take 1 Capsule by mouth daily.  Cetirizine (ZyrTEC) 10 mg cap Take 10 mg by mouth daily as needed.  varicella-zoster recombinant, PF, (Shingrix, PF,) 50 mcg/0.5 mL susr injection 0.5 ml IM once and then repeat in 2-6 months.  ALPRAZolam (Xanax) 0.5 mg tablet Take 1 Tab by mouth every twelve (12) hours as needed for Anxiety.  acetaminophen (TYLOPHEN) 500 mg capsule Take 1 Cap by mouth every four (4) hours as needed for Pain.  cholecalciferol, vitamin D3, (VITAMIN D3) 2,000 unit tab Take 2,000 Units by mouth daily.  MULTIVITAMIN PO Take 1 Tab by mouth daily. No current facility-administered medications for this visit.        Past Medical History:   Diagnosis Date    Anxiety     Cervical polyp 2014    BENIGN ENDOCERVICAL POLYP    Colon polyps     GERD (gastroesophageal reflux disease)     Hypertension     LGSIL (low grade squamous intraepithelial lesion) on Pap smear 2006    s/p colposcopy, PAP nl since then    Menopausal syndrome (hot flashes) 9/9/2013    Obesity     Osteoarthritis     Osteopenia 9/27/2016    Primary osteoarthritis of knees, bilateral 4/13/2016    Restless leg syndrome 9/9/2013        Past Surgical History:   Procedure Laterality Date    COLONOSCOPY N/A 8/13/2021    COLONOSCOPY performed by Yamileth Cruz MD at [address] [place] [address]  8/13/2021         HX BUNIONECTOMY Left 2008    HX COLONOSCOPY  2009    GI- Dr Evelina Thompson    HX COLONOSCOPY  7/30/12    polyp, irregularities, repeat 3 yrs    HX COLONOSCOPY  09/24/2015    normal - next 2020    HX COLPOSCOPY  2006    HX HEENT  2011    DENTAL IMPLANT    HX KNEE ARTHROSCOPY Left 2013    (torn meniscus)    HX KNEE ARTHROSCOPY Right 3/14    HX KNEE REPLACEMENT Bilateral 04/13/2016    HX KNEE REPLACEMENT Left 01/15/2020    revision    HX ORTHOPAEDIC Left 2015    BUNIONECTOMY LEFT FOOT    HX OTHER SURGICAL Left 5/8/15    LEFT FOOT LAPIDUS ARTHRODESIS, LEFT 2ND, 3RD MET CUNEIFORM FUSION, GASTROCNEMIS RECESSION, STJ ARTHROERESIS (GENERAL WITH POPLITEAL BLOCK)       Family History   Problem Relation Age of Onset   Chirinos Parkinsonism Father     Hypertension Father     OSTEOARTHRITIS Father     Cancer Father         PROSTATE-TREATED WITH ESTROGEN    Breast Cancer Sister 48    Glaucoma Mother     Heart Disease Mother         multiple MI's in her 52's    Other Sister         partial colon removal    Anesth Problems Neg Hx         Social History     Socioeconomic History    Marital status:      Spouse name: Not on file    Number of children: Not on file    Years of education: Not on file    Highest education level: Not on file   Occupational History    Not on file   Tobacco Use    Smoking status: Never Smoker    Smokeless tobacco: Never Used   Vaping Use    Vaping Use: Never used   Substance and Sexual Activity    Alcohol use: Yes     Alcohol/week: 7.0 standard drinks     Types: 1 Cans of beer, 6 Standard drinks or equivalent per week     Comment: socially    Drug use: Never    Sexual activity: Yes     Partners: Male     Birth control/protection: None   Other Topics Concern    Not on file   Social History Narrative    ** Merged History Encounter **          Social Determinants of Health     Financial Resource Strain:     Difficulty of Paying Living Expenses: Not on file   Food Insecurity:     Worried About Running Out of Food in the Last Year: Not on file    Power of Food in the Last Year: Not on file   Transportation Needs:     Lack of Transportation (Medical): Not on file    Lack of Transportation (Non-Medical):  Not on file   Physical Activity:     Days of Exercise per Week: Not on file    Minutes of Exercise per Session: Not on file   Stress:     Feeling of Stress : Not on file   Social Connections:     Frequency of Communication with Friends and Family: Not on file    Frequency of Social Gatherings with Friends and Family: Not on file    Attends [employer]ism Services: Not on file    Active Member of Clubs or Organizations: Not on file    Attends Club or Organization Meetings: Not on file    Marital Status: Not on file   Intimate Partner Violence:     Fear of Current or Ex-Partner: Not on file    Emotionally Abused: Not on file    Physically Abused: Not on file    Sexually Abused: Not on file   Housing Stability:     Unable to Pay for Housing in the Last Year: Not on file    Number of [place] in the Last Year: Not on file    Unstable Housing in the Last Year: Not on file       Orders Placed This Encounter    XR KNEE LT 3 V     Standing Status:   Future     Number of Occurrences:   1     Standing Expiration Date:   5/21/2023    predniSONE (STERAPRED) 5 mg dose pack     Sig: See administration instruction per 5mg dose pack (12 days)     Dispense:  48 Tablet     Refill:  0        An electronic signature was used to authenticate this note.   -- Robin Thompson MD

## 2022-05-23 ENCOUNTER — OFFICE VISIT (OUTPATIENT)
Dept: INTERNAL MEDICINE CLINIC | Age: 62
End: 2022-05-23
Payer: COMMERCIAL

## 2022-05-23 VITALS
BODY MASS INDEX: 36.95 KG/M2 | WEIGHT: 235.4 LBS | SYSTOLIC BLOOD PRESSURE: 110 MMHG | DIASTOLIC BLOOD PRESSURE: 78 MMHG | OXYGEN SATURATION: 97 % | RESPIRATION RATE: 20 BRPM | HEART RATE: 88 BPM | HEIGHT: 67 IN | TEMPERATURE: 97.3 F

## 2022-05-23 DIAGNOSIS — Z71.89 ADVANCED CARE PLANNING/COUNSELING DISCUSSION: ICD-10-CM

## 2022-05-23 DIAGNOSIS — Z12.31 ENCOUNTER FOR SCREENING MAMMOGRAM FOR MALIGNANT NEOPLASM OF BREAST: ICD-10-CM

## 2022-05-23 DIAGNOSIS — E66.01 MORBID OBESITY (HCC): ICD-10-CM

## 2022-05-23 DIAGNOSIS — I10 HYPERTENSION, ESSENTIAL: ICD-10-CM

## 2022-05-23 DIAGNOSIS — R73.03 PRE-DIABETES: ICD-10-CM

## 2022-05-23 DIAGNOSIS — M85.89 OSTEOPENIA OF MULTIPLE SITES: ICD-10-CM

## 2022-05-23 DIAGNOSIS — S76.311D PARTIAL HAMSTRING TEAR, RIGHT, SUBSEQUENT ENCOUNTER: ICD-10-CM

## 2022-05-23 DIAGNOSIS — K21.9 GASTROESOPHAGEAL REFLUX DISEASE WITHOUT ESOPHAGITIS: ICD-10-CM

## 2022-05-23 DIAGNOSIS — Z00.00 ROUTINE PHYSICAL EXAMINATION: Primary | ICD-10-CM

## 2022-05-23 DIAGNOSIS — M15.9 GENERALIZED OSTEOARTHRITIS OF MULTIPLE SITES: ICD-10-CM

## 2022-05-23 DIAGNOSIS — F41.9 ANXIETY: ICD-10-CM

## 2022-05-23 PROBLEM — F11.99 OPIOID USE, UNSPECIFIED WITH UNSPECIFIED OPIOID-INDUCED DISORDER (HCC): Status: RESOLVED | Noted: 2022-04-06 | Resolved: 2022-05-23

## 2022-05-23 PROCEDURE — 99396 PREV VISIT EST AGE 40-64: CPT | Performed by: INTERNAL MEDICINE

## 2022-05-23 NOTE — PROGRESS NOTES
Mounika Badillo is a 59 y.o. female who was seen in clinic today (5/23/2022) for a full physical.            Assessment & Plan:   Below is the assessment and plan developed based on review of pertinent history, physical exam, labs, studies, and medications. 1. Routine physical examination  -     METABOLIC PANEL, COMPREHENSIVE; Future  -     CBC W/O DIFF; Future  -     LIPID PANEL; Future  -     HEMOGLOBIN A1C WITH EAG; Future  2. Advanced care planning/counseling discussion  3. Encounter for screening mammogram for malignant neoplasm of breast  -     [facility] 3D GABBY W MAMMO BI SCREENING INCL CAD; Future  4. Hypertension, essential  Assessment & Plan:  at goal, continue current treatment pending review of labs    5. Morbid obesity ([address]  Assessment & Plan:  Improved slightly, still poorly controlled, and needs improvement, I have recommended the following interventions: encourage exercise and lifestyle education regarding diet. Did not review medications, will assess for changes once knee is improved & able to exercise again  6. Pre-diabetes  Assessment & Plan:  Unclear control, likely stable to improved, no changes pending review of labs   7. Anxiety  Assessment & Plan:  stable, continue current treatment    8. Osteopenia of multiple sites  Assessment & Plan:  Asymptomatic, continue current medications, will plan on repeat DEXA in '24 and stopping medications in '25   9. Gastroesophageal reflux disease without esophagitis  Assessment & Plan:  Chronic issue, well controlled, using medications prn, maybe 2/wk, did review trying to use 20mg OTC dose vs trying Pepcid OTC, either daily or as needed. Reviewed triggers & life style changes. Reviewed potential long term issues w/ PPI use   10. Partial hamstring tear, right, subsequent encounter  Assessment & Plan:  Improving, monitored by specialist. No acute findings meriting change in the plan  11.  Generalized osteoarthritis of multiple sites  Assessment & Plan:  Stable, intermittent issues, using Celebrex w/o side effects, labs have been stable, continue for now, life style changes to reduce pain     Follow-up and Dispositions    · Return in about 1 year (around 5/23/2023) for FULL PHYSICAL - 15 minutes. Subjective:   Niko Suarez is here today for a full physical.      Since last visit: while on vacation had a partial hamstring tear. Has been seeing ortho/PT. End of Life Planning  This was discussed with her today and she has an advanced directive - a copy HAS NOT been provided. Reviewed DNR/DNI and patient is not interested. Health Maintenance  Immunizations:   Covid: up to date  Influenza: up to date  Tetanus: up to date  Shingles: up to date  Pneumonia: n/a    Cancer screening:   Cervical: reviewed guidelines, up to date  Breast: reviewed guidelines, order placed  Colon: reviewed guidelines, up to date     DEXA: 6/2/21      The following acute and/or chronic problems were addressed today:    Cardiovascular Review  The patient has hypertension and obesity. She reports taking medications as instructed, no medication side effects noted, patient does not perform home BP monitoring. She generally follows a low sodium diet, exercises sporadically. GI Review  She has a history of GERD. She denies: abdominal bloating, heartburn, midepigastric pain and nausea. She has identified the following triggers: fatty foods, spicy foods. Medical therapy currently involves Prilosec. She is using medications 1 or 2 times/wk. Mental Health Review  Patient is seen for anxiety. She reports no problems. Available GAD7 reviewed. Reports experiences the following side effects from the treatment: none. Endocrine Review  She is seen for osteopenia. Since last visit: she did have a fall, no fracture. She is on the following treatment: Fosamax 35mg since 5/8/20. She reports compliance with all meds, and denies any med side effects. Last DEXA was on 6/2/21.        The following sections were reviewed & updated as appropriate: Problem List, Allergies, PMH, PSH, FH, and SH. Prior to Admission medications    Medication Sig Start Date End Date Taking? Authorizing Provider   predniSONE (STERAPRED) 5 mg dose pack See administration instruction per 5mg dose pack (12 days) 5/20/22  Yes Sami Jordan MD   lisinopriL (PRINIVIL, ZESTRIL) 20 mg tablet TAKE 1 TABLET BY MOUTH ONE TIME A DAY 4/28/22  Yes Jessie Will MD   celecoxib (CELEBREX) 200 mg capsule TAKE 1 CAPSULE BY MOUTH ONE TIME A DAY AS NEEDED FOR PAIN 4/28/22  Yes Jessie Will MD   PARoxetine (PAXIL) 40 mg tablet TAKE 1 TABLET BY MOUTH ONE TIME A DAY 1/24/22  Yes Jessie Will MD   alendronate (FOSAMAX) 35 mg tablet TAKE 1 TABLET BY MOUTH ONCE WEEKLY BEFORE BREAKFAST WITH A FULL GLASS OF WATER, ON AN EMPTY STOMACH: REMAIN UPRIGHT FOR 30 MINUTES 1/24/22  Yes Jessie Will MD   omeprazole (PRILOSEC) 40 mg capsule Take 1 Capsule by mouth daily. 7/25/21  Yes Jessie Will MD   Cetirizine (ZyrTEC) 10 mg cap Take 10 mg by mouth daily as needed. Yes Provider, Historical   ALPRAZolam (Xanax) 0.5 mg tablet Take 1 Tab by mouth every twelve (12) hours as needed for Anxiety. 5/8/20  Yes Jessie Will MD   acetaminophen (TYLOPHEN) 500 mg capsule Take 1 Cap by mouth every four (4) hours as needed for Pain. 1/15/20  Yes Erika Palmer PA-C   cholecalciferol, vitamin D3, (VITAMIN D3) 2,000 unit tab Take 2,000 Units by mouth daily. Yes Provider, Historical   MULTIVITAMIN PO Take 1 Tab by mouth daily. 4/27/11  Yes Provider, Historical   methocarbamoL (ROBAXIN) 500 mg tablet Take 1 Tablet by mouth three (3) times daily. 4/6/22 5/23/22  Paulina Scott MD   varicella-zoster recombinant, PF, (Shingrix, PF,) 50 mcg/0.5 mL susr injection 0.5 ml IM once and then repeat in 2-6 months.  5/18/21 5/23/22  Jessie Will MD           Review of Systems   Constitutional: Positive for weight loss. Negative for chills and fever. Respiratory: Negative for cough and shortness of breath. Cardiovascular: Negative for chest pain and palpitations. Gastrointestinal: Negative for abdominal pain, blood in stool, constipation, diarrhea, heartburn, nausea and vomiting. Musculoskeletal: Positive for joint pain (L knee, saw ortho, s/p steroids, improved) and myalgias (L hamstring, seeing ortho/PT, improving). Neurological: Negative for tingling, focal weakness and headaches. Endo/Heme/Allergies: Does not bruise/bleed easily. Psychiatric/Behavioral: Negative for depression. The patient is not nervous/anxious and does not have insomnia. Subjective:   Physical Exam  Constitutional:       General: She is not in acute distress. Appearance: She is well-developed. She is obese. HENT:      Right Ear: Tympanic membrane and ear canal normal.      Left Ear: Tympanic membrane and ear canal normal.   Eyes:      Conjunctiva/sclera: Conjunctivae normal.   Cardiovascular:      Rate and Rhythm: Regular rhythm. Heart sounds: Normal heart sounds. No murmur heard. Pulmonary:      Effort: Pulmonary effort is normal.      Breath sounds: Normal breath sounds. Abdominal:      General: Bowel sounds are normal.      Palpations: Abdomen is soft. Tenderness: There is no abdominal tenderness. Musculoskeletal:      Right lower leg: No edema. Left lower leg: No edema. Lymphadenopathy:      Cervical: No cervical adenopathy.    Psychiatric:         Mood and Affect: Mood and affect normal.            Visit Vitals  /78 (BP 1 Location: Left upper arm, BP Patient Position: Sitting, BP Cuff Size: Adult)   Pulse 88   Temp 97.3 °F (36.3 °C) (Temporal)   Resp 20   Ht 5' 7\" (1.702 m)   Wt 235 lb 6.4 oz (106.8 kg)   LMP 11/25/2012   SpO2 97%   BMI 36.87 kg/m²          Jong Baker MD

## 2022-05-23 NOTE — PATIENT INSTRUCTIONS

## 2022-05-23 NOTE — ACP (ADVANCE CARE PLANNING)
Advance Care Planning   Advance Care Planning (ACP) Physician/NP/PA (Provider) Conversation    Date of ACP Conversation: 05/23/22  Persons included in Conversation:  patient  Length of ACP Conversation in minutes: <16 minutes (Non-Billable)    Authorized Decision Maker (if patient is incapable of making informed decisions):   Named in Advance Directive or Healthcare Power of       Primary Decision Maker: Dusty Cantu - [phone]    She has an advanced directive - a copy HAS NOT been provided. Reviewed DNR/DNI and patient is not interested- elects Full Code (attempt resuscitation).        Eloise Gould MD

## 2022-05-24 NOTE — ASSESSMENT & PLAN NOTE
Stable, intermittent issues, using Celebrex w/o side effects, labs have been stable, continue for now, life style changes to reduce pain

## 2022-05-24 NOTE — ASSESSMENT & PLAN NOTE
Improved slightly, still poorly controlled, and needs improvement, I have recommended the following interventions: encourage exercise and lifestyle education regarding diet.   Did not review medications, will assess for changes once knee is improved & able to exercise again

## 2022-05-24 NOTE — ASSESSMENT & PLAN NOTE
Asymptomatic, continue current medications, will plan on repeat DEXA in '24 and stopping medications in '25

## 2022-05-24 NOTE — ASSESSMENT & PLAN NOTE
Chronic issue, well controlled, using medications prn, maybe 2/wk, did review trying to use 20mg OTC dose vs trying Pepcid OTC, either daily or as needed. Reviewed triggers & life style changes.   Reviewed potential long term issues w/ PPI use

## 2022-06-09 ENCOUNTER — TELEPHONE (OUTPATIENT)
Dept: INTERNAL MEDICINE CLINIC | Age: 62
End: 2022-06-09

## 2022-06-09 ENCOUNTER — HOSPITAL ENCOUNTER (OUTPATIENT)
Dept: MAMMOGRAPHY | Age: 62
Discharge: HOME | End: 2022-06-09
Attending: INTERNAL MEDICINE
Payer: COMMERCIAL

## 2022-06-09 DIAGNOSIS — Z12.31 ENCOUNTER FOR SCREENING MAMMOGRAM FOR MALIGNANT NEOPLASM OF BREAST: ICD-10-CM

## 2022-06-09 PROCEDURE — 77063 BREAST TOMOSYNTHESIS BI: CPT

## 2022-06-14 LAB
ALBUMIN SERPL-MCNC: 4 G/DL (ref 3.8–4.8)
ALBUMIN/GLOB SERPL: 1.1 {RATIO} (ref 1.2–2.2)
ALP SERPL-CCNC: 149 IU/L (ref 44–121)
ALT SERPL-CCNC: 35 IU/L (ref 0–32)
AST SERPL-CCNC: 34 IU/L (ref 0–40)
BILIRUB SERPL-MCNC: 0.4 MG/DL (ref 0–1.2)
BUN SERPL-MCNC: 9 MG/DL (ref 8–27)
BUN/CREAT SERPL: 11 (ref 12–28)
CALCIUM SERPL-MCNC: 9.2 MG/DL (ref 8.7–10.3)
CHLORIDE SERPL-SCNC: 100 MMOL/L (ref 96–106)
CHOLEST SERPL-MCNC: 246 MG/DL (ref 100–199)
CO2 SERPL-SCNC: 22 MMOL/L (ref 20–29)
CREAT SERPL-MCNC: 0.84 MG/DL (ref 0.57–1)
EGFR: 78 ML/MIN/1.73
ERYTHROCYTE [DISTWIDTH] IN BLOOD BY AUTOMATED COUNT: 14.1 % (ref 11.7–15.4)
EST. AVERAGE GLUCOSE BLD GHB EST-MCNC: 140 MG/DL
GLOBULIN SER CALC-MCNC: 3.5 G/DL (ref 1.5–4.5)
GLUCOSE SERPL-MCNC: 114 MG/DL (ref 65–99)
HBA1C MFR BLD: 6.5 % (ref 4.8–5.6)
HCT VFR BLD AUTO: 41.1 % (ref 34–46.6)
HDLC SERPL-MCNC: 43 MG/DL
HGB BLD-MCNC: 13 G/DL (ref 11.1–15.9)
LDLC SERPL CALC-MCNC: 132 MG/DL (ref 0–99)
MCH RBC QN AUTO: 27.4 PG (ref 26.6–33)
MCHC RBC AUTO-ENTMCNC: 31.6 G/DL (ref 31.5–35.7)
MCV RBC AUTO: 87 FL (ref 79–97)
PLATELET # BLD AUTO: 338 X10E3/UL (ref 150–450)
POTASSIUM SERPL-SCNC: 4.3 MMOL/L (ref 3.5–5.2)
PROT SERPL-MCNC: 7.5 G/DL (ref 6–8.5)
RBC # BLD AUTO: 4.75 X10E6/UL (ref 3.77–5.28)
SODIUM SERPL-SCNC: 135 MMOL/L (ref 134–144)
TRIGL SERPL-MCNC: 393 MG/DL (ref 0–149)
VLDLC SERPL CALC-MCNC: 71 MG/DL (ref 5–40)
WBC # BLD AUTO: 6.8 X10E3/UL (ref 3.4–10.8)

## 2022-06-14 NOTE — PROGRESS NOTES
Results released to patient via [employer]. All labs are stable or at goal for her, except for new dx of DM (A1c 6.5%). Fluctuating LFT's. LDL above goal w/ new dx of DM. Will offer statin.   Will have her RTC in 6 months after life style changes

## 2022-06-19 DIAGNOSIS — E78.00 PURE HYPERCHOLESTEROLEMIA: Primary | ICD-10-CM

## 2022-06-19 RX ORDER — ROSUVASTATIN CALCIUM 5 MG/1
5 TABLET, COATED ORAL
Qty: 30 TABLET | Refills: 0 | Status: SHIPPED | OUTPATIENT
Start: 2022-06-19 | End: 2022-07-13 | Stop reason: SDUPTHER

## 2022-06-27 ENCOUNTER — OFFICE VISIT (OUTPATIENT)
Dept: ORTHOPEDIC SURGERY | Age: 62
End: 2022-06-27
Payer: COMMERCIAL

## 2022-06-27 VITALS — BODY MASS INDEX: 35.31 KG/M2 | HEIGHT: 67 IN | WEIGHT: 225 LBS

## 2022-06-27 DIAGNOSIS — M76.892 PES ANSERINUS TENDONITIS OF LEFT LOWER EXTREMITY: Primary | ICD-10-CM

## 2022-06-27 DIAGNOSIS — Z96.652 STATUS POST LEFT KNEE REPLACEMENT: ICD-10-CM

## 2022-06-27 DIAGNOSIS — Z96.652 PAIN DUE TO TOTAL LEFT KNEE REPLACEMENT, SUBSEQUENT ENCOUNTER: ICD-10-CM

## 2022-06-27 DIAGNOSIS — T84.84XD PAIN DUE TO TOTAL LEFT KNEE REPLACEMENT, SUBSEQUENT ENCOUNTER: ICD-10-CM

## 2022-06-27 PROCEDURE — 20610 DRAIN/INJ JOINT/BURSA W/O US: CPT | Performed by: ORTHOPAEDIC SURGERY

## 2022-06-27 RX ORDER — TRIAMCINOLONE ACETONIDE 40 MG/ML
20 INJECTION, SUSPENSION INTRA-ARTICULAR; INTRAMUSCULAR ONCE
Status: COMPLETED | OUTPATIENT
Start: 2022-06-27 | End: 2022-06-27

## 2022-06-27 RX ORDER — BUPIVACAINE HYDROCHLORIDE 2.5 MG/ML
2 INJECTION, SOLUTION INFILTRATION; PERINEURAL ONCE
Status: COMPLETED | OUTPATIENT
Start: 2022-06-27 | End: 2022-06-27

## 2022-06-27 RX ADMIN — TRIAMCINOLONE ACETONIDE 20 MG: 40 INJECTION, SUSPENSION INTRA-ARTICULAR; INTRAMUSCULAR at 09:51

## 2022-06-27 RX ADMIN — BUPIVACAINE HYDROCHLORIDE 5 MG: 2.5 INJECTION, SOLUTION INFILTRATION; PERINEURAL at 09:51

## 2022-06-27 NOTE — LETTER
6/27/2022    Patient: Les Rodriguez   YOB: 1960   Date of Visit: 6/27/2022     Tahir Campbell, [address] [facility]  [address] [place] [address]  Via [address] [facility] [address]    Dear Tahir Campbell MD,      Thank you for referring Ms. Diogo Melara to [facility] for evaluation. My notes for this consultation are attached. If you have questions, please do not hesitate to call me. I look forward to following your patient along with you.       Sincerely,    Jai Casarez MD

## 2022-06-27 NOTE — PROGRESS NOTES
Bonita Norris (: 1960) is a 59 y.o. female, patient, here for evaluation of the following chief complaint(s):  Knee Pain (left knee)       SUBJECTIVE/OBJECTIVE:  Bonita Norris presents today complaining of persistent pain adjacent to revision left total knee replacement. Oral steroid taper at her last visit helped significantly while taking the medicine, rapid return of symptoms. Pain continues to be distal to the joint line. She is able to pinpoint it more to the anterior medial leg today, compared to previous which was more around the tibial tubercle. Pain is activity related. Waxes and wanes. Denies subjective instability. PHYSICAL EXAM:  Vitals: Ht 5' 7\" (1.702 m)   Wt 225 lb (102.1 kg)   LMP 2012   BMI 35.24 kg/m²   Body mass index is 35.24 kg/m². 59y.o. year old F, no distress. Ambulates without a limp. Pain-free motion left hip. Negative Stinchfield. Left knee neutral alignment. Healed midline anterior scar. No warmth swelling or effusion. She is point tender over the pes anserine insertion. Mild same area with resisted knee extension and flexion. 1+ coronal plane laxity throughout arc of motion. Symmetrical palpable distal pulses. No gross motor or sensory deficits in lower extremities. No distal edema. IMAGING:  Radiographs: XR Results (most recent):  Results from Appointment encounter on 22    XR KNEE LT 3 V    Narrative  3 x-ray views of left knee including AP, lateral, sunrise demonstrate satisfactory position alignment of cemented revision total knee components. Patella tracks centrally. No evidence of loosening. There are some stress shielding and resorption of bone under the medial tray. No change from appearance compared to 1 year postoperative x-rays. No change in position or alignment. ASSESSMENT/PLAN:  1.  Pes anserinus tendonitis of left lower extremity  -     bupivacaine HCl (MARCAINE) 0.25 % (2.5 mg/mL) injection 5 mg; 5 mg (2 mL), Other, ONCE, 1 dose, On Mon 6/27/22 at 1000  -     triamcinolone acetonide (KENALOG-40) 40 mg/mL injection 20 mg; 20 mg, IntraBURSal, ONCE, 1 dose, On Mon 6/27/22 at 1000  2. Status post left knee replacement  3. Pain due to total left knee replacement, subsequent encounter    The xray and exam findings were discussed with the patient today. Suspect pes anserine bursitis. Discussed comprehensive conservative treatment measures including any oral anti-inflammatory medications, physical therapy to include iontophoresis, corticosteroid injection. After discussion, she elects for corticosteroid injection of the pes bursa. For acute relief. Discussed risks and benefits, verbal consent obtained. She understands risk for subcutaneous atrophy, skin discoloration. After sterile prep of left anteromedial knee, 20 mg of Kenalog and 2 cc of 0.25% Marcaine were injected the point of maximal tenderness under sterile conditions. Tolerated procedure well. May consider physical therapy as outlined above if symptoms do not improve. No follow-ups on file. Review Of Systems  ROS     Positive for: Musculoskeletal    Last edited by Bhanu Donovan RN on 6/27/2022  8:38 AM. (History)         Patient denies any recent fever, chills, nausea, vomiting, chest pain, or shortness of breath. No Known Allergies    Current Outpatient Medications   Medication Sig    rosuvastatin (CRESTOR) 5 mg tablet Take 1 Tablet by mouth nightly.     predniSONE (STERAPRED) 5 mg dose pack See administration instruction per 5mg dose pack (12 days)    lisinopriL (PRINIVIL, ZESTRIL) 20 mg tablet TAKE 1 TABLET BY MOUTH ONE TIME A DAY    celecoxib (CELEBREX) 200 mg capsule TAKE 1 CAPSULE BY MOUTH ONE TIME A DAY AS NEEDED FOR PAIN    PARoxetine (PAXIL) 40 mg tablet TAKE 1 TABLET BY MOUTH ONE TIME A DAY    alendronate (FOSAMAX) 35 mg tablet TAKE 1 TABLET BY MOUTH ONCE WEEKLY BEFORE BREAKFAST WITH A FULL GLASS OF WATER, ON AN EMPTY STOMACH: REMAIN UPRIGHT FOR 30 MINUTES    omeprazole (PRILOSEC) 40 mg capsule Take 1 Capsule by mouth daily.  Cetirizine (ZyrTEC) 10 mg cap Take 10 mg by mouth daily as needed.  ALPRAZolam (Xanax) 0.5 mg tablet Take 1 Tab by mouth every twelve (12) hours as needed for Anxiety.  acetaminophen (TYLOPHEN) 500 mg capsule Take 1 Cap by mouth every four (4) hours as needed for Pain.  cholecalciferol, vitamin D3, (VITAMIN D3) 2,000 unit tab Take 2,000 Units by mouth daily.  MULTIVITAMIN PO Take 1 Tab by mouth daily. No current facility-administered medications for this visit.        Past Medical History:   Diagnosis Date    Anxiety     Cervical polyp 2014    BENIGN ENDOCERVICAL POLYP    Colon polyps     GERD (gastroesophageal reflux disease)     Hypertension     LGSIL (low grade squamous intraepithelial lesion) on Pap smear 2006    s/p colposcopy, PAP nl since then    Menopausal syndrome (hot flashes) 9/9/2013    Obesity     Osteoarthritis     Osteopenia 9/27/2016    Primary osteoarthritis of knees, bilateral 4/13/2016    Restless leg syndrome 9/9/2013        Past Surgical History:   Procedure Laterality Date    COLONOSCOPY N/A 8/13/2021    COLONOSCOPY performed by Mauri Farr [facility] at [facility] AM[facility]    HX BUNIONECTOMY Left 2008    HX COLONOSCOPY  2009    GI- Dr Mark Weaver    HX COLONOSCOPY  7/30/12    polyp, irregularities, repeat 3 yrs    HX COLONOSCOPY  09/24/2015    normal - next 2020    HX COLPOSCOPY  2006    HX HEENT  2011    DENTAL IMPLANT    HX KNEE ARTHROSCOPY Left 2013    (torn meniscus)    HX KNEE ARTHROSCOPY Right 3/14    HX KNEE REPLACEMENT Bilateral 04/13/2016    HX KNEE REPLACEMENT Left 01/15/2020    revision    HX ORTHOPAEDIC Left 2015    BUNIONECTOMY LEFT FOOT    HX OTHER SURGICAL Left 5/8/15    LEFT FOOT LAPIDUS ARTHRODESIS, LEFT 2ND, 3RD MET CUNEIFORM FUSION, GASTROCNEMIS RECESSION, STJ ARTHROERESIS (GENERAL WITH POPLITEAL BLOCK)       Family History Problem Relation Age of Onset   Chirinos Parkinsonism Father     Hypertension Father     OSTEOARTHRITIS Father     Prostate Cancer Father     Breast Cancer Sister 48    Glaucoma Mother     Coronary Art Dis Mother         multiple MI's in her 52's    Other Sister         partial colon removal    Anesth Problems Neg Hx         Social History     Socioeconomic History    Marital status:      Spouse name: Not on file    Number of children: Not on file    Years of education: Not on file    Highest education level: Not on file   Occupational History    Not on file   Tobacco Use    Smoking status: Never Smoker    Smokeless tobacco: Never Used   Vaping Use    Vaping Use: Never used   Substance and Sexual Activity    Alcohol use: Yes     Alcohol/week: 4.0 standard drinks     Types: 4 Standard drinks or equivalent per week    Drug use: Never    Sexual activity: Yes     Partners: Male     Birth control/protection: None   Other Topics Concern    Not on file   Social History Narrative    ** Merged History Encounter **          Social Determinants of Health     Financial Resource Strain:     Difficulty of Paying Living Expenses: Not on file   Food Insecurity:     Worried About Running Out of Food in the Last Year: Not on file    Power of Food in the Last Year: Not on file   Transportation Needs:     Lack of Transportation (Medical): Not on file    Lack of Transportation (Non-Medical):  Not on file   Physical Activity:     Days of Exercise per Week: Not on file    Minutes of Exercise per Session: Not on file   Stress:     Feeling of Stress : Not on file   Social Connections:     Frequency of Communication with Friends and Family: Not on file    Frequency of Social Gatherings with Friends and Family: Not on file    Attends [employer] Services: Not on file    Active Member of Clubs or Organizations: Not on file    Attends Club or Organization Meetings: Not on file    Marital Status: Not on file Intimate Partner Violence:     Fear of Current or Ex-Partner: Not on file    Emotionally Abused: Not on file    Physically Abused: Not on file    Sexually Abused: Not on file   Housing Stability:     Unable to Pay for Housing in the Last Year: Not on file    Number of [place] in the Last Year: Not on file    Unstable Housing in the Last Year: Not on file       Orders Placed This Encounter    bupivacaine HCl (MARCAINE) 0.25 % (2.5 mg/mL) injection 5 mg    triamcinolone acetonide (KENALOG-40) 40 mg/mL injection 20 mg        An electronic signature was used to authenticate this note.   -- Conrado Roland MD

## 2022-07-13 DIAGNOSIS — M85.89 OSTEOPENIA OF MULTIPLE SITES: ICD-10-CM

## 2022-07-13 DIAGNOSIS — E78.00 PURE HYPERCHOLESTEROLEMIA: ICD-10-CM

## 2022-07-14 RX ORDER — ROSUVASTATIN CALCIUM 5 MG/1
5 TABLET, COATED ORAL
Qty: 90 TABLET | Refills: 1 | Status: SHIPPED | OUTPATIENT
Start: 2022-07-14 | End: 2022-10-07 | Stop reason: ALTCHOICE

## 2022-07-14 RX ORDER — ALENDRONATE SODIUM 35 MG/1
35 TABLET ORAL
Qty: 12 TABLET | Refills: 3 | Status: SHIPPED | OUTPATIENT
Start: 2022-07-14

## 2022-07-25 DIAGNOSIS — I10 ESSENTIAL HYPERTENSION WITH GOAL BLOOD PRESSURE LESS THAN 140/90: ICD-10-CM

## 2022-07-25 DIAGNOSIS — M15.9 GENERALIZED OSTEOARTHRITIS: ICD-10-CM

## 2022-07-25 DIAGNOSIS — N95.1 MENOPAUSAL SYNDROME (HOT FLASHES): ICD-10-CM

## 2022-07-25 RX ORDER — LISINOPRIL 20 MG/1
20 TABLET ORAL DAILY
Qty: 90 TABLET | Refills: 0 | Status: SHIPPED | OUTPATIENT
Start: 2022-07-25 | End: 2022-10-30

## 2022-07-25 RX ORDER — PAROXETINE HYDROCHLORIDE 40 MG/1
40 TABLET, FILM COATED ORAL DAILY
Qty: 90 TABLET | Refills: 1 | Status: SHIPPED | OUTPATIENT
Start: 2022-07-25

## 2022-07-25 RX ORDER — CELECOXIB 200 MG/1
200 CAPSULE ORAL
Qty: 90 CAPSULE | Refills: 0 | Status: SHIPPED | OUTPATIENT
Start: 2022-07-25 | End: 2022-10-30

## 2022-07-28 NOTE — ASSESSMENT & PLAN NOTE
Asymptomatic, due for medications since starting medications (has been 3 yrs), no changed to medications pending review of DEXA Heart Failure/Diabetes Heart Failure/Diabetes/Enoxaparin/Lovenox

## 2022-10-03 ENCOUNTER — OFFICE VISIT (OUTPATIENT)
Dept: INTERNAL MEDICINE CLINIC | Age: 62
End: 2022-10-03
Payer: COMMERCIAL

## 2022-10-03 VITALS
RESPIRATION RATE: 18 BRPM | TEMPERATURE: 98.2 F | HEART RATE: 76 BPM | BODY MASS INDEX: 34.69 KG/M2 | OXYGEN SATURATION: 98 % | HEIGHT: 67 IN | WEIGHT: 221 LBS | SYSTOLIC BLOOD PRESSURE: 103 MMHG | DIASTOLIC BLOOD PRESSURE: 73 MMHG

## 2022-10-03 DIAGNOSIS — F41.9 ANXIETY: ICD-10-CM

## 2022-10-03 DIAGNOSIS — E66.9 OBESITY (BMI 30.0-34.9): ICD-10-CM

## 2022-10-03 DIAGNOSIS — E11.9 CONTROLLED TYPE 2 DIABETES MELLITUS WITHOUT COMPLICATION, WITHOUT LONG-TERM CURRENT USE OF INSULIN (HCC): Primary | ICD-10-CM

## 2022-10-03 DIAGNOSIS — E78.00 PURE HYPERCHOLESTEROLEMIA: ICD-10-CM

## 2022-10-03 DIAGNOSIS — I10 HYPERTENSION, ESSENTIAL: ICD-10-CM

## 2022-10-03 PROBLEM — E66.811 OBESITY (BMI 30.0-34.9): Status: ACTIVE | Noted: 2018-05-14

## 2022-10-03 PROCEDURE — 99214 OFFICE O/P EST MOD 30 MIN: CPT | Performed by: INTERNAL MEDICINE

## 2022-10-03 NOTE — ASSESSMENT & PLAN NOTE
New dx, with weight loss is out of morbid obesity range, I have recommended the following interventions: encourage exercise and lifestyle education regarding diet. We did review when to consider MTF or GLP1 to help with weight and DM.

## 2022-10-03 NOTE — ASSESSMENT & PLAN NOTE
New since last visit, likely well controlled w/ weight loss, long term diabetic complications discussed, needs to see eye specialist and continue current plan pending review of labs.   Recommended PNA shot but declined

## 2022-10-03 NOTE — PROGRESS NOTES
Rhonda Handley is a 57 y.o. female who was seen in clinic today (10/3/2022). Assessment & Plan:   Below is the assessment and plan developed based on review of pertinent history, physical exam, labs, studies, and medications. 1. Controlled type 2 diabetes mellitus without complication, without long-term current use of insulin ([facility] [address]  Assessment & Plan:  New since last visit, likely well controlled w/ weight loss, long term diabetic complications discussed, needs to see eye specialist and continue current plan pending review of labs. Recommended PNA shot but declined  Orders:  -     HEMOGLOBIN A1C WITH EAG; Future  -     LIPID PANEL; Future  -     MICROALBUMIN, UR, RAND W/ MICROALB/CREAT RATIO; Future  2. Obesity (BMI 30.0-34. 9)  Assessment & Plan:  New dx, with weight loss is out of morbid obesity range, I have recommended the following interventions: encourage exercise and lifestyle education regarding diet. We did review when to consider MTF or GLP1 to help with weight and DM. 3. Hypertension, essential  Assessment & Plan:  well controlled at home,a little low today, continue current treatment pending review of labs    4. Pure hypercholesterolemia  Assessment & Plan:  Not at goal, never started statin, reviewed her concerns, will reassess labs w/ life style changes but will still likely need a statin, she is hesitant    Orders:  -     LIPID PANEL; Future  5. Anxiety  Assessment & Plan:  well controlled, continue current treatment      Follow-up and Dispositions    Return in about 6 months (around 4/3/2023) for FULL PHYSICAL. Subjective/Objective:   Danielle Hutton was seen today for Diabetes and Hypertension    Since last visit: had a new dx of diabetes. Recommended crestor and decided not to start it. Weight has decreased using life style changes    Endocrine Review  She is seen for diabetes and obesity. Testing: is not performed.   She reports medication compliance: n/a - not on medications (diet controlled). Diabetic diet compliance: compliant most of the time. Cardiovascular Review  The patient has hypertension and hyperlipidemia. She reports taking medications as instructed, no medication side effects noted, home BP monitoring in range of 524'O systolic over 22'N diastolic. She generally follows a low fat low cholesterol diet, generally follows a low sodium diet, exercises sporadically. Mental Health Review  Patient is seen for anxiety. .  Available GAD7 reviewed. Reports experiences the following side effects from the treatment: none. Prior to Admission medications    Medication Sig Start Date End Date Taking? Authorizing Provider   PARoxetine (PAXIL) 40 mg tablet Take 1 Tablet by mouth in the morning. 7/25/22  Yes Arabella Chandra MD   lisinopriL (PRINIVIL, ZESTRIL) 20 mg tablet Take 1 Tablet by mouth in the morning. 7/25/22  Yes Arabella Chandra MD   celecoxib (CELEBREX) 200 mg capsule Take 1 Capsule by mouth daily as needed for Pain. Indications: Pain 7/25/22  Yes Arabella Chandra MD   alendronate (FOSAMAX) 35 mg tablet Take 1 Tablet by mouth every seven (7) days. 7/14/22  Yes Arabella Chandra MD   omeprazole (PRILOSEC) 40 mg capsule Take 1 Capsule by mouth daily. 7/25/21  Yes Arabella Chandra MD   Cetirizine (ZyrTEC) 10 mg cap Take 10 mg by mouth daily as needed. Yes Provider, Historical   ALPRAZolam (Xanax) 0.5 mg tablet Take 1 Tab by mouth every twelve (12) hours as needed for Anxiety. 5/8/20  Yes Arabella Chandra MD   acetaminophen (TYLOPHEN) 500 mg capsule Take 1 Cap by mouth every four (4) hours as needed for Pain. 1/15/20  Yes Erika Palmer PA-C   cholecalciferol, vitamin D3, 50 mcg (2,000 unit) tab Take 2,000 Units by mouth daily. Yes Provider, Historical   MULTIVITAMIN PO Take 1 Tab by mouth daily. 4/27/11  Yes Provider, Historical   rosuvastatin (CRESTOR) 5 mg tablet Take 1 Tablet by mouth nightly.   Patient not taking: Reported on 10/3/2022 7/14/22   Fernando Choi MD   predniSONE (STERAPRED) 5 mg dose pack See administration instruction per 5mg dose pack (12 days)  Patient not taking: Reported on 10/3/2022 5/20/22   Donna De Leon MD        Review of Systems   Constitutional:  Positive for weight loss. Negative for malaise/fatigue. Respiratory:  Negative for cough and shortness of breath. Cardiovascular:  Negative for chest pain, palpitations and leg swelling. Gastrointestinal:  Negative for abdominal pain, constipation, diarrhea, heartburn, nausea and vomiting. Musculoskeletal:  Negative for joint pain and myalgias. Skin:  Negative for rash. Neurological:  Negative for dizziness and headaches. Psychiatric/Behavioral:  Negative for depression. The patient is not nervous/anxious and does not have insomnia. Physical Exam  Constitutional:       General: She is not in acute distress. Eyes:      Conjunctiva/sclera: Conjunctivae normal.   Cardiovascular:      Rate and Rhythm: Regular rhythm. Pulses:           Dorsalis pedis pulses are 2+ on the right side and 2+ on the left side. Heart sounds: No murmur heard. Pulmonary:      Effort: Pulmonary effort is normal.      Breath sounds: Normal breath sounds. No decreased breath sounds or wheezing. Abdominal:      General: Bowel sounds are normal.      Palpations: Abdomen is soft. There is no hepatomegaly or splenomegaly. Tenderness: no abdominal tenderness   Musculoskeletal:      Right lower leg: No edema. Left lower leg: No edema.    Feet:      Right foot:      Skin integrity: Skin integrity normal.      Toenail Condition: Right toenails are normal.      Left foot:      Skin integrity: Skin integrity normal.      Toenail Condition: Left toenails are normal.      Comments:        Left foot Filament test: normal sensation        Right foot Filament test: normal sensation   Psychiatric:         Mood and Affect: Mood and affect normal. Behavior: Behavior normal.     Visit Vitals  /73   Pulse 76   Temp 98.2 °F (36.8 °C) (Temporal)   Resp 18   Ht 5' 7\" (1.702 m)   Wt 221 lb (100.2 kg)   LMP 11/25/2012   SpO2 98%   BMI 34.61 kg/m²       Garrison Mon MD

## 2022-10-03 NOTE — ASSESSMENT & PLAN NOTE
Not at goal, never started statin, reviewed her concerns, will reassess labs w/ life style changes but will still likely need a statin, she is hesitant

## 2022-10-05 LAB
ALBUMIN/CREAT UR: <9 MG/G CREAT (ref 0–29)
CHOLEST SERPL-MCNC: 257 MG/DL (ref 100–199)
CREAT UR-MCNC: 33.5 MG/DL
EST. AVERAGE GLUCOSE BLD GHB EST-MCNC: 128 MG/DL
HBA1C MFR BLD: 6.1 % (ref 4.8–5.6)
HDLC SERPL-MCNC: 41 MG/DL
LDLC SERPL CALC-MCNC: 160 MG/DL (ref 0–99)
MICROALBUMIN UR-MCNC: <3 UG/ML
TRIGL SERPL-MCNC: 300 MG/DL (ref 0–149)
VLDLC SERPL CALC-MCNC: 56 MG/DL (ref 5–40)

## 2022-10-05 NOTE — PROGRESS NOTES
Results released to patient via [email or web address]. All labs are stable or at goal for her, except for cholesterol. High TG and LDL. Will once again recommend statin, pt declined previously.

## 2022-10-07 RX ORDER — ATORVASTATIN CALCIUM 10 MG/1
10 TABLET, FILM COATED ORAL DAILY
Qty: 30 TABLET | Refills: 1 | Status: SHIPPED | OUTPATIENT
Start: 2022-10-07 | End: 2022-11-02 | Stop reason: SDUPTHER

## 2022-10-30 DIAGNOSIS — M15.9 GENERALIZED OSTEOARTHRITIS: ICD-10-CM

## 2022-10-30 DIAGNOSIS — I10 ESSENTIAL HYPERTENSION WITH GOAL BLOOD PRESSURE LESS THAN 140/90: ICD-10-CM

## 2022-10-30 RX ORDER — CELECOXIB 200 MG/1
CAPSULE ORAL
Qty: 90 CAPSULE | Refills: 3 | Status: SHIPPED | OUTPATIENT
Start: 2022-10-30

## 2022-10-30 RX ORDER — LISINOPRIL 20 MG/1
TABLET ORAL
Qty: 90 TABLET | Refills: 3 | Status: SHIPPED | OUTPATIENT
Start: 2022-10-30

## 2022-10-31 NOTE — TELEPHONE ENCOUNTER
Future Appointments   Date Time Provider Josué Colin   5/26/2023  8:20 AM Arabella Chandra MD [facility]

## 2022-11-02 DIAGNOSIS — E78.00 PURE HYPERCHOLESTEROLEMIA: Primary | ICD-10-CM

## 2022-11-03 RX ORDER — ATORVASTATIN CALCIUM 10 MG/1
10 TABLET, FILM COATED ORAL DAILY
Qty: 90 TABLET | Refills: 0 | Status: SHIPPED | OUTPATIENT
Start: 2022-11-03

## 2022-11-14 ENCOUNTER — TRANSCRIBE ORDER (OUTPATIENT)
Dept: SCHEDULING | Age: 62
End: 2022-11-14

## 2022-11-14 DIAGNOSIS — M76.811: Primary | ICD-10-CM

## 2022-11-19 ENCOUNTER — HOSPITAL ENCOUNTER (OUTPATIENT)
Dept: MRI IMAGING | Age: 62
Discharge: HOME | End: 2022-11-19
Attending: PODIATRIST
Payer: COMMERCIAL

## 2022-11-19 DIAGNOSIS — M76.811: ICD-10-CM

## 2022-11-19 PROCEDURE — 73718 MRI LOWER EXTREMITY W/O DYE: CPT

## 2022-12-14 ENCOUNTER — OFFICE VISIT (OUTPATIENT)
Dept: INTERNAL MEDICINE CLINIC | Age: 62
End: 2022-12-14
Payer: COMMERCIAL

## 2022-12-14 VITALS
HEART RATE: 98 BPM | WEIGHT: 229 LBS | SYSTOLIC BLOOD PRESSURE: 115 MMHG | OXYGEN SATURATION: 98 % | TEMPERATURE: 98.1 F | RESPIRATION RATE: 18 BRPM | BODY MASS INDEX: 35.94 KG/M2 | DIASTOLIC BLOOD PRESSURE: 79 MMHG | HEIGHT: 67 IN

## 2022-12-14 DIAGNOSIS — K21.9 GASTROESOPHAGEAL REFLUX DISEASE WITHOUT ESOPHAGITIS: ICD-10-CM

## 2022-12-14 DIAGNOSIS — Z01.818 PRE-OP EXAM: Primary | ICD-10-CM

## 2022-12-14 DIAGNOSIS — I10 HYPERTENSION, ESSENTIAL: ICD-10-CM

## 2022-12-14 DIAGNOSIS — R79.89 ELEVATED LFTS: ICD-10-CM

## 2022-12-14 DIAGNOSIS — M79.671 RIGHT FOOT PAIN: ICD-10-CM

## 2022-12-14 DIAGNOSIS — E11.9 CONTROLLED TYPE 2 DIABETES MELLITUS WITHOUT COMPLICATION, WITHOUT LONG-TERM CURRENT USE OF INSULIN (HCC): ICD-10-CM

## 2022-12-14 DIAGNOSIS — F41.9 ANXIETY: ICD-10-CM

## 2022-12-14 PROCEDURE — 93000 ELECTROCARDIOGRAM COMPLETE: CPT | Performed by: INTERNAL MEDICINE

## 2022-12-14 RX ORDER — OMEPRAZOLE 40 MG/1
40 CAPSULE, DELAYED RELEASE ORAL DAILY
Qty: 90 CAPSULE | Refills: 3 | Status: SHIPPED | OUTPATIENT
Start: 2022-12-14

## 2022-12-14 RX ORDER — ALPRAZOLAM 0.5 MG/1
0.5 TABLET ORAL
Qty: 10 TABLET | Refills: 0 | Status: SHIPPED | OUTPATIENT
Start: 2022-12-14

## 2022-12-14 NOTE — ASSESSMENT & PLAN NOTE
Well controlled, asking for refills on meds. Last filled in May '20 for 10 tabs[facility]. Reviewed when to use medications.   Medications refilled

## 2022-12-14 NOTE — PROGRESS NOTES
Preoperative Evaluation:   David Santana is 57 y.o. female (1960) who presents for preoperative evaluation. Procedure/Surgery: R 1st metatarsal cubiform fusion and tibialis anterior tendon repair   Date of Procedure/Surgery: 12/28/2022   Surgeon: Dr Vickey Ken: [facility]  Primary Physician: Isabela Beltre MD     Reason for surgery: pain    Latex Allergy: NO  Recent use of: Celebrex - will stop 1 week prior to surgery. Tetanus up to date: last tetanus booster within 10 years  Anesthesia Complications: None  History of abnormal bleeding : None  History of Blood Transfusions: no  Health Care Directive or Living Will: yes      EKG: EKG FINDINGS - normal EKG, normal sinus rhythm, sinus tachycardia  CXR: n/a  Labs:  CMP and CBC ordered      Pre-Operative Risk Assessment Using 2014 ACC/AHA Guidelines     Emergent procedure: No  Active Cardiac Condition including decompensated HF, Arrhythmia, MI <3 weeks, severe valve disease: No  Risk Level of Procedure: Low Risk (endoscopy, superficial skin, breast, ambulatory, or cataract, etc.)  Revised Cardiac Risk Index Risk factors: None  Measurement of Exercise Tolerance before Surgery is >4 METS (climbing > 1 flight of stairs without stopping, walking up hill > 1-2 blocks, scrubbing floors, moving furniture, golf, bowling, dancing or tennis, or running short distance): Yes    According to the 2014 ACC/AHA pre-operative risk assessment guidelines Francesca Matias is at low risk for major cardiac complications during a Low Risk procedure and procedure may proceed as planned. Medication Recommendations: stop Celebrex 1 week prior to procedure. Will use Tylenol as needed for pain. Prior to Admission medications    Medication Sig Start Date End Date Taking? Authorizing Provider   atorvastatin (LIPITOR) 10 mg tablet Take 1 Tablet by mouth daily.  11/3/22  Yes Isabela Beltre MD   lisinopriL (Ryan Peeks) 20 mg tablet TAKE ONE TABLET BY MOUTH EVERY MORNING 10/30/22  Yes Sasha Vasquez MD   celecoxib (CELEBREX) 200 mg capsule TAKE 1 CAPSULE BY MOUTH ONE TIME A DAY AS NEEDED FOR PAIN 10/30/22  Yes Sasha Vasquez MD   PARoxetine (PAXIL) 40 mg tablet Take 1 Tablet by mouth in the morning. 7/25/22  Yes Sasha Vasquez MD   alendronate (FOSAMAX) 35 mg tablet Take 1 Tablet by mouth every seven (7) days. 7/14/22  Yes Sasha Vasquez MD   omeprazole (PRILOSEC) 40 mg capsule Take 1 Capsule by mouth daily. 7/25/21  Yes Sasha Vasquez MD   ALPRAZolam (Xanax) 0.5 mg tablet Take 1 Tab by mouth every twelve (12) hours as needed for Anxiety. 5/8/20  Yes Sasha Vasquez MD   acetaminophen (TYLOPHEN) 500 mg capsule Take 1 Cap by mouth every four (4) hours as needed for Pain. 1/15/20  Yes Erika Palmer PA-C   cholecalciferol, vitamin D3, 50 mcg (2,000 unit) tab Take 2,000 Units by mouth daily. Yes Provider, Historical   MULTIVITAMIN PO Take 1 Tab by mouth daily. 4/27/11  Yes Provider, Historical   predniSONE (STERAPRED) 5 mg dose pack See administration instruction per 5mg dose pack (12 days)  Patient not taking: Reported on 10/3/2022 5/20/22 12/14/22  Andrea Umaña MD   Cetirizine (ZyrTEC) 10 mg cap Take 10 mg by mouth daily as needed.   12/14/22  Provider, Historical        No Known Allergies       Patient Active Problem List    Diagnosis Date Noted    Pure hypercholesterolemia 10/03/2022    Gastroesophageal reflux disease without esophagitis 05/23/2022    Partial hamstring tear 04/11/2022    S/P TKR (total knee replacement), left 01/15/2020    Fatty liver 06/06/2019    Obesity (BMI 30.0-34.9) 05/14/2018    Osteopenia 09/27/2016    Controlled type 2 diabetes mellitus without complication, without long-term current use of insulin ([facility] [address] 03/30/2016    History of colon polyps 09/24/2015    Generalized osteoarthritis of multiple sites     Cervical polyp     Anxiety 03/03/2014    Menopausal syndrome (hot flashes) 09/09/2013    Restless leg syndrome 09/09/2013    Hypertension, essential 05/04/2011       Past Medical History:   Diagnosis Date    Anxiety     Cervical polyp 2014    BENIGN ENDOCERVICAL POLYP    Colon polyps     GERD (gastroesophageal reflux disease)     Hypertension     LGSIL (low grade squamous intraepithelial lesion) on Pap smear 2006    s/p colposcopy, PAP nl since then    Menopausal syndrome (hot flashes) 9/9/2013    Obesity     Osteoarthritis     Osteopenia 9/27/2016    Primary osteoarthritis of knees, bilateral 4/13/2016    Restless leg syndrome 9/9/2013       Past Surgical History:   Procedure Laterality Date    COLONOSCOPY N/A 8/13/2021    COLONOSCOPY performed by Yogesh Almanza MD at [facility] AM[facility] OR    HX BUNIONECTOMY Left 2008    HX COLONOSCOPY  2009    GI- Dr Moise Urias    HX COLONOSCOPY  7/30/12    polyp, irregularities, repeat 3 yrs    HX COLONOSCOPY  09/24/2015    normal - next 2020    HX COLPOSCOPY  2006    HX HEENT  2011    DENTAL IMPLANT    HX KNEE ARTHROSCOPY Left 2013    (torn meniscus)    HX KNEE ARTHROSCOPY Right 3/14    HX KNEE REPLACEMENT Bilateral 04/13/2016    HX KNEE REPLACEMENT Left 01/15/2020    revision    HX ORTHOPAEDIC Left 2015    BUNIONECTOMY LEFT FOOT    HX OTHER SURGICAL Left 5/8/15    LEFT FOOT LAPIDUS ARTHRODESIS, LEFT 2ND, 3RD MET CUNEIFORM FUSION, GASTROCNEMIS RECESSION, STJ ARTHROERESIS (GENERAL WITH POPLITEAL BLOCK)       Social History     Tobacco Use    Smoking status: Never    Smokeless tobacco: Never   Substance Use Topics    Alcohol use: Yes     Alcohol/week: 4.0 standard drinks     Types: 4 Standard drinks or equivalent per week            Review of Systems   Constitutional:  Negative for chills and fever. HENT:  Negative for hearing loss and tinnitus. Eyes:  Negative for blurred vision and double vision. Respiratory:  Negative for cough and shortness of breath. Cardiovascular:  Negative for chest pain and palpitations. Gastrointestinal:  Negative for abdominal pain, blood in stool, constipation, diarrhea, heartburn, nausea and vomiting. Musculoskeletal:  Positive for joint pain (R ankle). Negative for myalgias. Neurological:  Negative for dizziness, tingling, sensory change, focal weakness, weakness and headaches. Endo/Heme/Allergies:  Does not bruise/bleed easily. Psychiatric/Behavioral:  Negative for depression. The patient is not nervous/anxious and does not have insomnia. Physical Exam  Constitutional:       General: She is not in acute distress. Appearance: She is obese. Eyes:      Conjunctiva/sclera: Conjunctivae normal.   Cardiovascular:      Rate and Rhythm: Regular rhythm. Heart sounds: No murmur heard. Pulmonary:      Effort: Pulmonary effort is normal.      Breath sounds: Normal breath sounds. No decreased breath sounds or wheezing. Abdominal:      General: Bowel sounds are normal.      Palpations: Abdomen is soft. There is no hepatomegaly or splenomegaly. Tenderness: There is no abdominal tenderness. Musculoskeletal:      Right lower leg: No edema. Left lower leg: No edema. Neurological:      Cranial Nerves: Cranial nerves 2-12 are intact. Sensory: Sensation is intact. Motor: Motor function is intact. Psychiatric:         Mood and Affect: Mood and affect normal.         Behavior: Behavior normal.        Visit Vitals  /79   Pulse 98   Temp 98.1 °F (36.7 °C) (Temporal)   Resp 18   Ht 5' 7\" (1.702 m)   Wt 229 lb (103.9 kg)   LMP 11/25/2012   SpO2 98%   BMI 35.87 kg/m²             Assessment & Plan:  1. Pre-op exam  Comments:  no contra-indications to planned surgery pending review of labs, she will stop Celebrex 1 wk prior to procedure  Orders:  -     AMB POC EKG ROUTINE W/ 12 LEADS, INTER & REP  -     METABOLIC PANEL, COMPREHENSIVE; Future  -     CBC W/O DIFF; Future  2.  Right foot pain  Comments:  no contra-indications to planned surgery pending review of labs  3. Controlled type 2 diabetes mellitus without complication, without long-term current use of insulin ([facility] [address]  Comments:  well controlled, no changes  4. Hypertension, essential  Comments:  well controlled, no changes  5. Anxiety  Assessment & Plan:  Well controlled, asking for refills on meds. Last filled in May '20 for 10 tabs. [facility]. Reviewed when to use medications. Medications refilled   Orders:  -     ALPRAZolam (Xanax) 0.5 mg tablet; Take 1 Tablet by mouth every twelve (12) hours as needed for Anxiety., Normal, Disp-10 Tablet, R-0  6. Gastroesophageal reflux disease without esophagitis  Comments:  medication refilled  Orders:  -     omeprazole (PRILOSEC) 40 mg capsule; Take 1 Capsule by mouth daily. , Normal, Disp-90 Capsule, R-3    Follow-up and Dispositions    Return if symptoms worsen or fail to improve.          Dung Celeste MD

## 2022-12-16 LAB
ALBUMIN SERPL-MCNC: 4 G/DL (ref 3.8–4.8)
ALBUMIN/GLOB SERPL: 1 {RATIO} (ref 1.2–2.2)
ALP SERPL-CCNC: 252 IU/L (ref 44–121)
ALT SERPL-CCNC: 84 IU/L (ref 0–32)
AST SERPL-CCNC: 73 IU/L (ref 0–40)
BILIRUB SERPL-MCNC: 0.4 MG/DL (ref 0–1.2)
BUN SERPL-MCNC: 12 MG/DL (ref 8–27)
BUN/CREAT SERPL: 14 (ref 12–28)
CALCIUM SERPL-MCNC: 9.3 MG/DL (ref 8.7–10.3)
CHLORIDE SERPL-SCNC: 101 MMOL/L (ref 96–106)
CO2 SERPL-SCNC: 22 MMOL/L (ref 20–29)
CREAT SERPL-MCNC: 0.83 MG/DL (ref 0.57–1)
EGFR: 79 ML/MIN/1.73
ERYTHROCYTE [DISTWIDTH] IN BLOOD BY AUTOMATED COUNT: 13.1 % (ref 11.7–15.4)
GLOBULIN SER CALC-MCNC: 4.1 G/DL (ref 1.5–4.5)
GLUCOSE SERPL-MCNC: 110 MG/DL (ref 70–99)
HCT VFR BLD AUTO: 41.2 % (ref 34–46.6)
HGB BLD-MCNC: 13.2 G/DL (ref 11.1–15.9)
MCH RBC QN AUTO: 27.4 PG (ref 26.6–33)
MCHC RBC AUTO-ENTMCNC: 32 G/DL (ref 31.5–35.7)
MCV RBC AUTO: 86 FL (ref 79–97)
PLATELET # BLD AUTO: 412 X10E3/UL (ref 150–450)
POTASSIUM SERPL-SCNC: 4.9 MMOL/L (ref 3.5–5.2)
PROT SERPL-MCNC: 8.1 G/DL (ref 6–8.5)
RBC # BLD AUTO: 4.81 X10E6/UL (ref 3.77–5.28)
SODIUM SERPL-SCNC: 138 MMOL/L (ref 134–144)
WBC # BLD AUTO: 9.4 X10E3/UL (ref 3.4–10.8)

## 2022-12-16 NOTE — PROGRESS NOTES
Please call patient. All labs are stable or at goal except for increase in LFT's. She is asymptomatic, likely statin related (just started atorvastatin 2 months ago). Stop atorvastatin. Repeat LFT's next week. Assuming LFT's are stable or improved then no contra-indications to planned surgery. Labs ordered - [facility], non-fasting.

## 2022-12-22 NOTE — PROGRESS NOTES
Results released to patient via [employer]. LFT's stable. New since starting atorvastatin. Will stop.

## 2022-12-28 RX ORDER — AMOXICILLIN 500 MG/1
500 CAPSULE ORAL 3 TIMES DAILY
COMMUNITY

## 2022-12-28 NOTE — PERIOP NOTES
Phone interview completed with patient. Pre-op instructions reviewed and discussed. Medications reviewed and instructions given on when/if to stop/take prior to surgery. Opportunity given for patient to ask questions, and questions answered. Patient instructed to purchase CHG soap or Hibiclens OTC and follow directions as listed; advised to use night before surgery and day of surgery. INSTRUCTED TO BRING COVID CARD DAY OF SURGERY. PATIENT IS TAKING AMOXICILLIN FOR TOOTH INFECTION. DR. Thomas Verde IS AWARE. SHE HAS AN APPOINTMENT WITH ENDODONTIST ON 12/29/30 AND WILL FOLLOW UP WITH DR. MONAHAN AFTER THAT APPOINTMENT.   HE SAID AT THIS TIME IT IS OK TO CONTINUE AMOXICILLIN AND TO PROCEED WITH SURGERY UNLESS ENDODONTIST SAYS OTHERWISE.

## 2022-12-29 ENCOUNTER — ANESTHESIA EVENT (OUTPATIENT)
Dept: SURGERY | Age: 62
End: 2022-12-29
Payer: COMMERCIAL

## 2022-12-30 ENCOUNTER — HOSPITAL ENCOUNTER (OUTPATIENT)
Age: 62
Setting detail: OUTPATIENT SURGERY
Discharge: HOME | End: 2022-12-30
Attending: PODIATRIST | Admitting: PODIATRIST
Payer: COMMERCIAL

## 2022-12-30 ENCOUNTER — ANESTHESIA (OUTPATIENT)
Dept: SURGERY | Age: 62
End: 2022-12-30
Payer: COMMERCIAL

## 2022-12-30 VITALS
SYSTOLIC BLOOD PRESSURE: 95 MMHG | HEART RATE: 94 BPM | DIASTOLIC BLOOD PRESSURE: 53 MMHG | WEIGHT: 225 LBS | OXYGEN SATURATION: 97 % | HEIGHT: 67 IN | TEMPERATURE: 98.4 F | BODY MASS INDEX: 35.31 KG/M2 | RESPIRATION RATE: 19 BRPM

## 2022-12-30 PROCEDURE — C1713 ANCHOR/SCREW BN/BN,TIS/BN: HCPCS | Performed by: PODIATRIST

## 2022-12-30 PROCEDURE — C1769 GUIDE WIRE: HCPCS | Performed by: PODIATRIST

## 2022-12-30 PROCEDURE — 74011250636 HC RX REV CODE- 250/636: Performed by: ANESTHESIOLOGY

## 2022-12-30 PROCEDURE — 74011250636 HC RX REV CODE- 250/636: Performed by: PODIATRIST

## 2022-12-30 PROCEDURE — 74011000250 HC RX REV CODE- 250: Performed by: ANESTHESIOLOGY

## 2022-12-30 PROCEDURE — 74011250636 HC RX REV CODE- 250/636: Performed by: NURSE ANESTHETIST, CERTIFIED REGISTERED

## 2022-12-30 PROCEDURE — 76210000020 HC REC RM PH II FIRST 0.5 HR: Performed by: PODIATRIST

## 2022-12-30 PROCEDURE — 74011000250 HC RX REV CODE- 250: Performed by: NURSE ANESTHETIST, CERTIFIED REGISTERED

## 2022-12-30 PROCEDURE — 74011000258 HC RX REV CODE- 258: Performed by: NURSE ANESTHETIST, CERTIFIED REGISTERED

## 2022-12-30 PROCEDURE — 76010000132 HC OR TIME 2.5 TO 3 HR: Performed by: PODIATRIST

## 2022-12-30 PROCEDURE — 77030003882 HC BIT DRL TWST BRSM -B: Performed by: PODIATRIST

## 2022-12-30 PROCEDURE — 77030003747: Performed by: PODIATRIST

## 2022-12-30 PROCEDURE — 76210000017 HC OR PH I REC 1.5 TO 2 HR: Performed by: PODIATRIST

## 2022-12-30 PROCEDURE — 77030025006 HC BUR STRY -C: Performed by: PODIATRIST

## 2022-12-30 PROCEDURE — 2709999900 HC NON-CHARGEABLE SUPPLY: Performed by: PODIATRIST

## 2022-12-30 PROCEDURE — 77030031139 HC SUT VCRL2 J&J -A: Performed by: PODIATRIST

## 2022-12-30 PROCEDURE — 77030010396 HC WRE FIX C CNMD -A: Performed by: PODIATRIST

## 2022-12-30 PROCEDURE — 77030016653 HC BUR OVL4 STRY -B: Performed by: PODIATRIST

## 2022-12-30 PROCEDURE — 77030026438 HC STYL ET INTUB CARD -A: Performed by: ANESTHESIOLOGY

## 2022-12-30 PROCEDURE — 77030020754 HC CUF TRNQT 2BLA STRY -B: Performed by: PODIATRIST

## 2022-12-30 PROCEDURE — 77030011992 HC AIRWY NASOPHGL TELE -A: Performed by: ANESTHESIOLOGY

## 2022-12-30 PROCEDURE — 77030008684 HC TU ET CUF COVD -B: Performed by: ANESTHESIOLOGY

## 2022-12-30 PROCEDURE — 77030002916 HC SUT ETHLN J&J -A: Performed by: PODIATRIST

## 2022-12-30 PROCEDURE — 77030006788 HC BLD SAW OSC STRY -B: Performed by: PODIATRIST

## 2022-12-30 PROCEDURE — 77030008771 HC TU NG SALEM SUMP -A: Performed by: ANESTHESIOLOGY

## 2022-12-30 PROCEDURE — 76060000036 HC ANESTHESIA 2.5 TO 3 HR: Performed by: PODIATRIST

## 2022-12-30 PROCEDURE — 74011000250 HC RX REV CODE- 250: Performed by: PODIATRIST

## 2022-12-30 PROCEDURE — 74011250637 HC RX REV CODE- 250/637: Performed by: ANESTHESIOLOGY

## 2022-12-30 PROCEDURE — 74011250637 HC RX REV CODE- 250/637: Performed by: NURSE ANESTHETIST, CERTIFIED REGISTERED

## 2022-12-30 PROCEDURE — 77030037210 HC INSTR DISP BB TAK THRD ARTH -B: Performed by: PODIATRIST

## 2022-12-30 DEVICE — IMPLANTABLE DEVICE: Type: IMPLANTABLE DEVICE | Site: FOOT | Status: FUNCTIONAL

## 2022-12-30 DEVICE — PLATE BNE SHT R PLNTR FOR COMPHSVE FT SYS LAPIDUS: Type: IMPLANTABLE DEVICE | Site: FOOT | Status: FUNCTIONAL

## 2022-12-30 DEVICE — SCREW BNE L20MM DIA3.5MM CORT FT ANK TI LOK LO PROF: Type: IMPLANTABLE DEVICE | Site: FOOT | Status: FUNCTIONAL

## 2022-12-30 DEVICE — SCREW BNE L18MM DIA3.5MM CORT TI LOK LO PROF FOR ANK FRAC: Type: IMPLANTABLE DEVICE | Site: FOOT | Status: FUNCTIONAL

## 2022-12-30 RX ORDER — ACETAMINOPHEN 325 MG/1
650 TABLET ORAL ONCE
Status: COMPLETED | OUTPATIENT
Start: 2022-12-30 | End: 2022-12-30

## 2022-12-30 RX ORDER — ONDANSETRON 2 MG/ML
INJECTION INTRAMUSCULAR; INTRAVENOUS AS NEEDED
Status: DISCONTINUED | OUTPATIENT
Start: 2022-12-30 | End: 2022-12-30 | Stop reason: HOSPADM

## 2022-12-30 RX ORDER — MIDAZOLAM HYDROCHLORIDE 1 MG/ML
0.5 INJECTION, SOLUTION INTRAMUSCULAR; INTRAVENOUS
Status: DISCONTINUED | OUTPATIENT
Start: 2022-12-30 | End: 2022-12-30 | Stop reason: HOSPADM

## 2022-12-30 RX ORDER — SODIUM CHLORIDE, SODIUM LACTATE, POTASSIUM CHLORIDE, CALCIUM CHLORIDE 600; 310; 30; 20 MG/100ML; MG/100ML; MG/100ML; MG/100ML
1000 INJECTION, SOLUTION INTRAVENOUS CONTINUOUS
Status: DISCONTINUED | OUTPATIENT
Start: 2022-12-30 | End: 2022-12-30 | Stop reason: HOSPADM

## 2022-12-30 RX ORDER — BUPIVACAINE HYDROCHLORIDE 5 MG/ML
INJECTION, SOLUTION EPIDURAL; INTRACAUDAL AS NEEDED
Status: DISCONTINUED | OUTPATIENT
Start: 2022-12-30 | End: 2022-12-30 | Stop reason: HOSPADM

## 2022-12-30 RX ORDER — SODIUM CHLORIDE 9 MG/ML
25 INJECTION, SOLUTION INTRAVENOUS CONTINUOUS
Status: DISCONTINUED | OUTPATIENT
Start: 2022-12-30 | End: 2022-12-30 | Stop reason: HOSPADM

## 2022-12-30 RX ORDER — ROPIVACAINE HYDROCHLORIDE 5 MG/ML
30 INJECTION, SOLUTION EPIDURAL; INFILTRATION; PERINEURAL AS NEEDED
Status: DISCONTINUED | OUTPATIENT
Start: 2022-12-30 | End: 2022-12-30 | Stop reason: HOSPADM

## 2022-12-30 RX ORDER — HYDROMORPHONE HYDROCHLORIDE 1 MG/ML
0.2 INJECTION, SOLUTION INTRAMUSCULAR; INTRAVENOUS; SUBCUTANEOUS
Status: DISCONTINUED | OUTPATIENT
Start: 2022-12-30 | End: 2022-12-30 | Stop reason: HOSPADM

## 2022-12-30 RX ORDER — FENTANYL CITRATE 50 UG/ML
25 INJECTION, SOLUTION INTRAMUSCULAR; INTRAVENOUS
Status: DISCONTINUED | OUTPATIENT
Start: 2022-12-30 | End: 2022-12-30 | Stop reason: HOSPADM

## 2022-12-30 RX ORDER — MIDAZOLAM HYDROCHLORIDE 1 MG/ML
INJECTION, SOLUTION INTRAMUSCULAR; INTRAVENOUS AS NEEDED
Status: DISCONTINUED | OUTPATIENT
Start: 2022-12-30 | End: 2022-12-30 | Stop reason: HOSPADM

## 2022-12-30 RX ORDER — MORPHINE SULFATE 2 MG/ML
2 INJECTION, SOLUTION INTRAMUSCULAR; INTRAVENOUS
Status: DISCONTINUED | OUTPATIENT
Start: 2022-12-30 | End: 2022-12-30 | Stop reason: HOSPADM

## 2022-12-30 RX ORDER — HYDROCORTISONE SODIUM SUCCINATE 100 MG/2ML
INJECTION, POWDER, FOR SOLUTION INTRAMUSCULAR; INTRAVENOUS AS NEEDED
Status: DISCONTINUED | OUTPATIENT
Start: 2022-12-30 | End: 2022-12-30 | Stop reason: HOSPADM

## 2022-12-30 RX ORDER — CEFAZOLIN SODIUM 1 G/3ML
INJECTION, POWDER, FOR SOLUTION INTRAMUSCULAR; INTRAVENOUS AS NEEDED
Status: DISCONTINUED | OUTPATIENT
Start: 2022-12-30 | End: 2022-12-30 | Stop reason: HOSPADM

## 2022-12-30 RX ORDER — GLYCOPYRROLATE 0.2 MG/ML
0.2 INJECTION INTRAMUSCULAR; INTRAVENOUS
Status: DISCONTINUED | OUTPATIENT
Start: 2022-12-30 | End: 2022-12-30 | Stop reason: HOSPADM

## 2022-12-30 RX ORDER — MIDAZOLAM HYDROCHLORIDE 1 MG/ML
1 INJECTION, SOLUTION INTRAMUSCULAR; INTRAVENOUS AS NEEDED
Status: DISCONTINUED | OUTPATIENT
Start: 2022-12-30 | End: 2022-12-30 | Stop reason: HOSPADM

## 2022-12-30 RX ORDER — PHENYLEPHRINE HCL IN 0.9% NACL 0.4MG/10ML
SYRINGE (ML) INTRAVENOUS AS NEEDED
Status: DISCONTINUED | OUTPATIENT
Start: 2022-12-30 | End: 2022-12-30 | Stop reason: HOSPADM

## 2022-12-30 RX ORDER — LIDOCAINE HYDROCHLORIDE 10 MG/ML
0.1 INJECTION, SOLUTION EPIDURAL; INFILTRATION; INTRACAUDAL; PERINEURAL AS NEEDED
Status: DISCONTINUED | OUTPATIENT
Start: 2022-12-30 | End: 2022-12-30 | Stop reason: HOSPADM

## 2022-12-30 RX ORDER — ALBUTEROL SULFATE 0.83 MG/ML
2.5 SOLUTION RESPIRATORY (INHALATION) AS NEEDED
Status: DISCONTINUED | OUTPATIENT
Start: 2022-12-30 | End: 2022-12-30 | Stop reason: HOSPADM

## 2022-12-30 RX ORDER — NORETHINDRONE AND ETHINYL ESTRADIOL 0.5-0.035
KIT ORAL AS NEEDED
Status: DISCONTINUED | OUTPATIENT
Start: 2022-12-30 | End: 2022-12-30 | Stop reason: HOSPADM

## 2022-12-30 RX ORDER — LIDOCAINE HYDROCHLORIDE 10 MG/ML
INJECTION INFILTRATION; PERINEURAL AS NEEDED
Status: DISCONTINUED | OUTPATIENT
Start: 2022-12-30 | End: 2022-12-30 | Stop reason: HOSPADM

## 2022-12-30 RX ORDER — DEXAMETHASONE SODIUM PHOSPHATE 4 MG/ML
INJECTION, SOLUTION INTRA-ARTICULAR; INTRALESIONAL; INTRAMUSCULAR; INTRAVENOUS; SOFT TISSUE AS NEEDED
Status: DISCONTINUED | OUTPATIENT
Start: 2022-12-30 | End: 2022-12-30 | Stop reason: HOSPADM

## 2022-12-30 RX ORDER — FENTANYL CITRATE 50 UG/ML
INJECTION, SOLUTION INTRAMUSCULAR; INTRAVENOUS AS NEEDED
Status: DISCONTINUED | OUTPATIENT
Start: 2022-12-30 | End: 2022-12-30 | Stop reason: HOSPADM

## 2022-12-30 RX ORDER — PROPOFOL 10 MG/ML
INJECTION, EMULSION INTRAVENOUS
Status: DISCONTINUED | OUTPATIENT
Start: 2022-12-30 | End: 2022-12-30 | Stop reason: HOSPADM

## 2022-12-30 RX ORDER — PROPOFOL 10 MG/ML
INJECTION, EMULSION INTRAVENOUS AS NEEDED
Status: DISCONTINUED | OUTPATIENT
Start: 2022-12-30 | End: 2022-12-30 | Stop reason: HOSPADM

## 2022-12-30 RX ORDER — HYDROCODONE BITARTRATE AND ACETAMINOPHEN 5; 325 MG/1; MG/1
1 TABLET ORAL AS NEEDED
Status: DISCONTINUED | OUTPATIENT
Start: 2022-12-30 | End: 2022-12-30 | Stop reason: HOSPADM

## 2022-12-30 RX ORDER — FENTANYL CITRATE 50 UG/ML
50 INJECTION, SOLUTION INTRAMUSCULAR; INTRAVENOUS AS NEEDED
Status: DISCONTINUED | OUTPATIENT
Start: 2022-12-30 | End: 2022-12-30 | Stop reason: HOSPADM

## 2022-12-30 RX ORDER — DIPHENHYDRAMINE HYDROCHLORIDE 50 MG/ML
12.5 INJECTION, SOLUTION INTRAMUSCULAR; INTRAVENOUS AS NEEDED
Status: DISCONTINUED | OUTPATIENT
Start: 2022-12-30 | End: 2022-12-30 | Stop reason: HOSPADM

## 2022-12-30 RX ORDER — ALBUTEROL SULFATE 90 UG/1
AEROSOL, METERED RESPIRATORY (INHALATION) AS NEEDED
Status: DISCONTINUED | OUTPATIENT
Start: 2022-12-30 | End: 2022-12-30 | Stop reason: HOSPADM

## 2022-12-30 RX ORDER — ROCURONIUM BROMIDE 10 MG/ML
INJECTION, SOLUTION INTRAVENOUS AS NEEDED
Status: DISCONTINUED | OUTPATIENT
Start: 2022-12-30 | End: 2022-12-30 | Stop reason: HOSPADM

## 2022-12-30 RX ORDER — ONDANSETRON 2 MG/ML
4 INJECTION INTRAMUSCULAR; INTRAVENOUS AS NEEDED
Status: DISCONTINUED | OUTPATIENT
Start: 2022-12-30 | End: 2022-12-30 | Stop reason: HOSPADM

## 2022-12-30 RX ADMIN — ALBUTEROL SULFATE 10 PUFF: 90 AEROSOL, METERED RESPIRATORY (INHALATION) at 08:35

## 2022-12-30 RX ADMIN — PROPOFOL 100 MG: 10 INJECTION, EMULSION INTRAVENOUS at 09:49

## 2022-12-30 RX ADMIN — MIDAZOLAM HYDROCHLORIDE 3 MG: 1 INJECTION, SOLUTION INTRAMUSCULAR; INTRAVENOUS at 07:29

## 2022-12-30 RX ADMIN — Medication 200 MCG: at 08:04

## 2022-12-30 RX ADMIN — CEFAZOLIN 2 G: 330 INJECTION, POWDER, FOR SOLUTION INTRAMUSCULAR; INTRAVENOUS at 08:00

## 2022-12-30 RX ADMIN — Medication 200 MCG: at 08:13

## 2022-12-30 RX ADMIN — Medication 120 MCG: at 08:25

## 2022-12-30 RX ADMIN — HYDROCORTISONE SODIUM SUCCINATE 100 MG: 100 INJECTION, POWDER, FOR SOLUTION INTRAMUSCULAR; INTRAVENOUS at 09:02

## 2022-12-30 RX ADMIN — PHENYLEPHRINE HYDROCHLORIDE 40 MCG/MIN: 10 INJECTION INTRAVENOUS at 08:52

## 2022-12-30 RX ADMIN — ROCURONIUM BROMIDE 30 MG: 10 SOLUTION INTRAVENOUS at 08:35

## 2022-12-30 RX ADMIN — EPHEDRINE SULFATE 10 MG: 50 INJECTION INTRAVENOUS at 08:18

## 2022-12-30 RX ADMIN — FENTANYL CITRATE 25 MCG: 50 INJECTION INTRAMUSCULAR; INTRAVENOUS at 07:37

## 2022-12-30 RX ADMIN — PROPOFOL 50 MCG/KG/MIN: 10 INJECTION, EMULSION INTRAVENOUS at 07:34

## 2022-12-30 RX ADMIN — ALBUTEROL SULFATE 10 PUFF: 90 AEROSOL, METERED RESPIRATORY (INHALATION) at 10:01

## 2022-12-30 RX ADMIN — MIDAZOLAM HYDROCHLORIDE 2 MG: 1 INJECTION, SOLUTION INTRAMUSCULAR; INTRAVENOUS at 07:34

## 2022-12-30 RX ADMIN — PROPOFOL 25 MG: 10 INJECTION, EMULSION INTRAVENOUS at 07:38

## 2022-12-30 RX ADMIN — ONDANSETRON HYDROCHLORIDE 4 MG: 2 INJECTION, SOLUTION INTRAMUSCULAR; INTRAVENOUS at 08:01

## 2022-12-30 RX ADMIN — ACETAMINOPHEN 650 MG: 325 TABLET ORAL at 07:04

## 2022-12-30 RX ADMIN — Medication 200 MCG: at 08:47

## 2022-12-30 RX ADMIN — SODIUM CHLORIDE, POTASSIUM CHLORIDE, SODIUM LACTATE AND CALCIUM CHLORIDE 1000 ML: 600; 310; 30; 20 INJECTION, SOLUTION INTRAVENOUS at 06:57

## 2022-12-30 RX ADMIN — Medication 120 MCG: at 08:18

## 2022-12-30 RX ADMIN — SODIUM CHLORIDE, POTASSIUM CHLORIDE, SODIUM LACTATE AND CALCIUM CHLORIDE: 600; 310; 30; 20 INJECTION, SOLUTION INTRAVENOUS at 08:25

## 2022-12-30 RX ADMIN — Medication 200 MCG: at 08:01

## 2022-12-30 RX ADMIN — DEXMEDETOMIDINE HYDROCHLORIDE 10 MCG: 100 INJECTION, SOLUTION, CONCENTRATE INTRAVENOUS at 07:34

## 2022-12-30 RX ADMIN — PROPOFOL 50 MG: 10 INJECTION, EMULSION INTRAVENOUS at 07:34

## 2022-12-30 RX ADMIN — Medication 80 MCG: at 08:26

## 2022-12-30 RX ADMIN — FENTANYL CITRATE 25 MCG: 50 INJECTION INTRAMUSCULAR; INTRAVENOUS at 07:34

## 2022-12-30 RX ADMIN — SUGAMMADEX 200 MG: 100 INJECTION, SOLUTION INTRAVENOUS at 09:46

## 2022-12-30 RX ADMIN — DEXAMETHASONE SODIUM PHOSPHATE 8 MG: 4 INJECTION, SOLUTION INTRAMUSCULAR; INTRAVENOUS at 08:01

## 2022-12-30 NOTE — DISCHARGE INSTRUCTIONS
INSTRUCTIONS FOLLOWING FOOT SURGERY    SEE DR. MONAHAN'S DISCHARGE INSTRUCTION SHEET ( THE BLUE SHEET). ACTIVITY:  Elevate feet for 48 hours  Use ice as directed by your doctor  MINIMAL activity until seen by Dr. Lv Rosas, limited partial weight bearing to left heel in surgical shoe for transfer only    DIET:  Clear liquids until no nausea or vomiting; then light diet for the first day  An advance to regular diet on second day, unless your doctor orders otherwise. PAIN:  Take pain medication as directed by your doctor. Call your doctor if pain is not relieved by medication. Do not take aspirin or blood thinners until directed by your doctor. DRESSING CARE: keep dressing clean and dry, do not remove       FOLLOW-UP PHONE CALLS:  Calls will be made by nursing staff. If you had any problems, call your doctor as needed. CALL YOUR DOCTOR IF:  Excessive bleeding that does not stop after holding mild pressure over the area  Temperature of 101° F or above  Redness, excessive swelling or bruising, and/or green or yellow, smelly discharge from incision  Loss of sensation -cold, white, or blue toes    AFTER ANESTHESIA :   For the first 24 hours: do not drive, drink alcoholic beverages, or make important decisions. Be aware of dizziness following anesthesia and while taking pain medication.       DISCHARGE MEDICATIONS: Take as instructed      TYLENOL WAS GIVEN AT 7 AM.    APPOINTMENT DATE/TIME: Monday January 2, @ 3:45    Tom Gu PHONE NUMBER: [phone]    Patients signature:  Date: 12/30/2022  Discharging Nurse:

## 2022-12-30 NOTE — BRIEF OP NOTE
Brief Postoperative Note    Patient: Kayleen Jones  YOB: 1960  MRN: [id number]    Date of Procedure: 12/30/2022     Pre-Op Diagnosis: ARTHRITIS RIGHT FOOT    Post-Op Diagnosis: Same as preoperative diagnosis. Procedure(s):  RIGHT FOOT, MIDFOOT FUSION/STIFFENING (1ST TARSO-METATARSAL JOINT)  REPAIR OF ANTERIOR TIBIAL TENDON  CHEILECTOMY OF 1ST TOE JOINT RIGHT     Surgeon(s):  Leida Eagle, DPM Wannetta Dandy, DPM  Surgical Assistant: None    Anesthesia: General     Estimated Blood Loss (mL): less than 10    Complications: None    Specimens: * No specimens in log *     Implants:   Implant Name Type Inv.  Item Serial No.  Lot No. LRB No. Used Action   PLATE BNE SHT R PLNTR FOR COMPHSVE FT SYS [employer] - SN/A  PLATE BNE SHT R PLNTR FOR COMPHSVE FT SYS [employer] N/A [employer] N/A Right 1 Implanted   SCREW BNE L20MM DIA3.5MM TRACIE FT ANK TI EMELINA LO PROF - SN/A  SCREW BNE L20MM DIA3.5MM TRACIE FT ANK TI EMELINA LO PROF N/A [employer] N/A Right 1 Implanted   SCREW BNE L18MM DIA3.5MM TRACIE TI EMELINA LO PROF FOR ANK FRAC - SN/A  SCREW BNE L18MM DIA3.5MM TRACIE TI EMELINA LO PROF FOR ANK FRAC N/A [employer] N/A Right 2 Implanted   SCR BNE TRACIE LP 3.5X18MM TI --  - SN/A  SCR BNE TRACIE LP 3.5X18MM TI --  N/A [employer] N/A Right 1 Implanted   SCREW BONE L32MM DIA4MM TRACIE FT ANK TI LCK LO PROF - SN/A  SCREW BONE L32MM DIA4MM TRACIE FT ANK TI LCK LO PROF N/A [employer] N/A Right 1 Implanted       Drains: * No LDAs found *    Findings: Arthritis 1st TMT joint, bony exostosis dorsal to 1st metatarsophalangeal joint, thickended anterior tibial tendon at insertion    Electronically Signed by Wannetta Dandy, DPM on 12/30/2022 at 10:00 AM

## 2022-12-30 NOTE — ANESTHESIA POSTPROCEDURE EVALUATION
Post-Anesthesia Evaluation and Assessment    Patient: Tracy Daugherty MRN: [id number]  SSN: [id number]    YOB: 1960  Age: 57 y.o. Sex: female      I have evaluated the patient and they are stable and ready for discharge from the PACU. Cardiovascular Function/Vital Signs  Visit Vitals  BP (!) 95/53   Pulse 94   Temp 36.9 °C (98.4 °F)   Resp 19   Ht 5' 7\" (1.702 m)   Wt 102.1 kg (225 lb)   SpO2 97%   BMI 35.24 kg/m²       Patient is status post General anesthesia for Procedure(s):  RIGHT FOOT, MIDFOOT FUSION/STIFFENING (1ST TARSO-METATARSAL JOINT), REPAIR OF TENDON, CHEILECTOMY OF 1ST TOE JOINT RIGHT 1ST METATARSAL CUNEIFORM FUSION. Nausea/Vomiting: None    Postoperative hydration reviewed and adequate. Pain:  Pain Scale 1: Numeric (0 - 10) (12/30/22 1132)  Pain Intensity 1: 0 (12/30/22 1132)   Managed    Neurological Status:   Neuro (WDL): Within Defined Limits (12/30/22 1100)  Neuro  Neurologic State: Alert; Appropriate for age (12/30/22 1015)  Orientation Level: Oriented X4 (12/30/22 1015)  Cognition: Follows commands (12/30/22 1015)  Speech: Clear (12/30/22 1015)  LUE Motor Response: Purposeful (12/30/22 1015)  LLE Motor Response: Purposeful (12/30/22 1015)  RUE Motor Response: Purposeful (12/30/22 1015)  RLE Motor Response: Numbness (12/30/22 1100)   At baseline    Mental Status, Level of Consciousness: Alert and  oriented to person, place, and time    Pulmonary Status:   O2 Device: None (Room air) (12/30/22 1132)   Adequate oxygenation and airway patent    Complications related to anesthesia: None    Post-anesthesia assessment completed. No concerns    Signed By: Sanju Burroughs MD     December 30, 2022              Procedure(s):  RIGHT FOOT, MIDFOOT FUSION/STIFFENING (1ST TARSO-METATARSAL JOINT), REPAIR OF TENDON, CHEILECTOMY OF 1ST TOE JOINT RIGHT 1ST METATARSAL CUNEIFORM FUSION.     MAC, general    <BSHSIANPOST>    INITIAL Post-op Vital signs:   Vitals Value Taken Time   BP 95/53 12/30/22 1133   Temp 36.9 °C (98.4 °F) 12/30/22 1015   Pulse 96 12/30/22 1137   Resp 23 12/30/22 1137   SpO2 97 % 12/30/22 1137   Vitals shown include unvalidated device data.

## 2022-12-30 NOTE — OP NOTES
Postoperative Note    Patient: Parul Perez  YOB: 1960  MRN: [id number]    Date of Procedure: 12/30/2022     Pre-Op Diagnosis: ARTHRITIS RIGHT FOOT    Post-Op Diagnosis: Same as preoperative diagnosis. Procedure(s):  RIGHT FOOT, MIDFOOT FUSION/STIFFENING (1ST TARSO-METATARSAL JOINT)  REPAIR OF ANTERIOR TIBIAL TENDON  CHEILECTOMY OF 1ST TOE JOINT RIGHT     Surgeon(s):  Marinus Reedy, DPM Millie Dandy, DPM  Surgical Assistant: None    Anesthesia: General     Estimated Blood Loss (mL): less than 10    Injectables: 30cc marcaine 0.5% plain , 20cc lidocaine 1% plain    Complications: None    Specimens: * No specimens in log *     Implants:   Implant Name Type Inv. Item Serial No.  Lot No. LRB No. Used Action   PLATE BNE SHT R PLNTR FOR COMPHSVE FT SYS [employer] - SN/A  PLATE BNE SHT R PLNTR FOR COMPHSVE FT SYS [employer] N/A [employer] N/A Right 1 Implanted   SCREW BNE L20MM DIA3.5MM TRACIE FT ANK TI EMELINA LO PROF - SN/A  SCREW BNE L20MM DIA3.5MM TRACIE FT ANK TI EMELINA LO PROF N/A [employer] N/A Right 1 Implanted   SCREW BNE L18MM DIA3.5MM TRACIE TI EMELINA LO PROF FOR ANK FRAC - SN/A  SCREW BNE L18MM DIA3.5MM TRACIE TI EMELINA LO PROF FOR ANK FRAC N/A [employer] N/A Right 2 Implanted   SCR BNE TRACIE LP 3.5X18MM TI --  - SN/A  SCR BNE TRACIE LP 3.5X18MM TI --  N/A [employer] N/A Right 1 Implanted   SCREW BONE L32MM DIA4MM TRACIE FT ANK TI LCK LO PROF - SN/A  SCREW BONE L32MM DIA4MM TRACIE FT ANK TI LCK LO PROF N/A [employer] N/A Right 1 Implanted     Indication  Patient is a 33-year-old female presented to institution for a right foot surgery. Patient has similar arthritic pain to her left side which is alleviated by fusion of her midfoot. Conservative treatment has been attempted in outpatient setting under care of Dr. Priyank Vidal including cortisone injection, shoe modification without improvement symptoms. At this time patient elected for surgical treatment.   Surgical fusion of first tarsometatarsal joint, anterior tibial tendon repair and cheilectomy was recommended patient. Outpatient MRI showed finding consistent with arthritis at the first tarsometatarsal joint, bony prominence to the first metatarsal head, tendinosis of the anterior tibial tendon. Surgical risk, benefit, complication, nature procedure explained to the patient. Patient understand agreeable provided informed consent. At this time patient declined alternative or second opinion. No surgical outcome is guaranteed. Under mild sedation patient was brought into the operating room and placed on the surgical table in supine position. All bony prominences were well-padded over the surgical table. Initially MAC anesthesia was carried out by the anesthesiology team however was eventually converted to a general anesthesia. 30 cc of Marcaine 0.5% plain was then injected to the right foot. And then right foot and ankle was scrubbed draped in standard sterile fashion. Proper laterality and limb was identified and all agreed upon, prior to surgery. Right foot and ankle was then exsanguinated and an ankle tourniquet was  Inflated to 250 mmHg and it was deflated a total of 90-minute yomi. Attention was then directed to the right foot. Hypermobility at the first ray noted along with slight crepitus at the joint. Plantar medial incision made along the first tarsometatarsal joint using 15 blade to the soft subcutaneous layer. Blunt dissection was carried down to level of the insertion of the anterior tibial tendon was noted. Thickened anterior tibial tendon at the insertion was noted. Anterior tibial tendon was then partially resected over the first tarsometatarsal joint area. Soft tissue around the first tarsometatarsal joint was then freed up using a 15 blade and elevator. Then the joint was distracted using a pin distractor. All dissection performed with care to preserve all vital neurovascular structure.   Then the joint was prepared for fusion which included denuding the cartilage using combination of bur and rongeur. Surgical site was then copiously irrigated with normal saline. In the joint fusion site was then fenestrated using a cortical small drill bit. And the first metatarsal was reduced using a Hubscher maneuver and temporarily held using K wire. Intraoperative fluoroscopy confirmed adequate positioning of the pins and the alignment. Then Arthrex plantar Lapidus fusion plate was then used to fixate the fusion site which included 3.5 compression screw with plate and screws. Previously transected and the tibial tendon was then further debrided until healthy tendon ends were noted. Then the tendons were reapproximated using 3-0 Vicryl. Attention was then directed to the first metatarsal phalange joint. Slight dorsal bony prominence was noted. Small 2 cm incision was made medial to the extensor hallucis longus tendon. Bluntly dissected until the capsule was noted. Linear capsule incision was made to expose the bony prominence. After identifying the dorsal exostosis of the first metatarsal head it was resected using rongeur and smoothed out using a rasp. Surgical site was then copiously irrigated normal saline again. All surgical site was then closed in layered fashion using 2-0 Vicryl, 3-0 Vicryl  3-0 nylon. 20 cc of 1% lidocaine plain was then injected to the right foot for postoperative pain management. Right foot was dressed then dressed with Xeroform for 4 gauze Damaris and an Ace bandage. Patient tolerated procedure anesthesia well. Patient was transferred back to PACU with vital stable and neurovascular status intact the right lower extremity. Nonweightbearing to the right lower extremity in surgical shoe  Follow-up outpatient with Dr. Brian Nieto throughout the entirety of the entire course.       Drains: * No LDAs found *    Findings: Arthritis 1st TMT joint, bony exostosis dorsal to 1st metatarsophalangeal joint, thickended anterior tibial tendon at insertion    Electronically Signed by Tatyana Holley DPM on 12/30/2022 at 10:00 AM

## 2022-12-30 NOTE — ROUTINE PROCESS
Patient: Beba Shafer MRN: [id number]  SSN: [id number]   YOB: 1960  Age: 57 y.o. Sex: female     Patient is status post Procedure(s):  RIGHT FOOT, MIDFOOT FUSION/STIFFENING (1ST TARSO-METATARSAL JOINT), REPAIR OF TENDON, CHEILECTOMY OF 1ST TOE JOINT RIGHT 1ST METATARSAL CUNEIFORM FUSION. Surgeon(s) and Role: Geraldine Braswell DPM - Primary    Local/Dose/Irrigation:                    Peripheral IV 12/30/22 Right; Lower Arm (Active)   Site Assessment Clean, dry, & intact 12/30/22 0701   Phlebitis Assessment 0 12/30/22 0701   Infiltration Assessment 0 12/30/22 0701   Dressing Status Clean, dry, & intact 12/30/22 0701   Dressing Type Tape;Transparent 12/30/22 0701   Hub Color/Line Status Pink; Infusing 12/30/22 0701            Airway - Endotracheal Tube 12/30/22 Oral (Active)                   Dressing/Packing:  Incision 12/30/22 Foot Right-Dressing/Treatment:  (Xeroform, 4x4, Kerlex, Ace Bandage) (12/30/22 0938)    Splint/Cast:  ]    Other:

## 2022-12-30 NOTE — ANESTHESIA PREPROCEDURE EVALUATION
Relevant Problems   CARDIOVASCULAR   (+) Hypertension, essential      GASTROINTESTINAL   (+) Fatty liver   (+) Gastroesophageal reflux disease without esophagitis      ENDOCRINE   (+) Controlled type 2 diabetes mellitus without complication, without long-term current use of insulin (HCC)       Anesthetic History   No history of anesthetic complications            Review of Systems / Medical History  Patient summary reviewed, nursing notes reviewed and pertinent labs reviewed    Pulmonary  Within defined limits                 Neuro/Psych         Psychiatric history ( anxiety)     Cardiovascular  Within defined limits  Hypertension              Exercise tolerance: >4 METS  Comments: 1/20 EKG= SR   GI/Hepatic/Renal     GERD: well controlled          Comments: H/o colon polyps Endo/Other        Obesity and arthritis     Other Findings   Comments: Restless leg syndrome  osteopenia           Physical Exam    Airway  Mallampati: III  TM Distance: 4 - 6 cm  Neck ROM: normal range of motion   Mouth opening: Normal     Cardiovascular    Rhythm: regular  Rate: normal         Dental    Dentition: Bridges and Caps/crowns  Comments: Multiple crowns   Pulmonary  Breath sounds clear to auscultation               Abdominal  GI exam deferred       Other Findings            Anesthetic Plan    ASA: 3  Anesthesia type: MAC and general - backup          Induction: Intravenous  Anesthetic plan and risks discussed with: Patient

## 2023-02-12 DIAGNOSIS — N95.1 MENOPAUSAL SYNDROME (HOT FLASHES): ICD-10-CM

## 2023-02-12 RX ORDER — PAROXETINE HYDROCHLORIDE 40 MG/1
TABLET, FILM COATED ORAL
Qty: 90 TABLET | Refills: 1 | Status: SHIPPED | OUTPATIENT
Start: 2023-02-12

## 2023-02-13 NOTE — TELEPHONE ENCOUNTER
Future Appointments   Date Time Provider Josué Colin   5/26/2023  8:20 AM Sherrie Rudd MD [facility]

## 2023-06-26 RX ORDER — ALENDRONATE SODIUM 35 MG/1
TABLET ORAL
Qty: 12 TABLET | Refills: 3 | Status: SHIPPED | OUTPATIENT
Start: 2023-06-26

## 2023-07-01 ENCOUNTER — NURSE TRIAGE (OUTPATIENT)
Dept: OTHER | Facility: CLINIC | Age: 63
End: 2023-07-01

## 2023-07-02 ENCOUNTER — OFFICE VISIT (OUTPATIENT)
Age: 63
End: 2023-07-02

## 2023-07-02 VITALS
TEMPERATURE: 98.1 F | DIASTOLIC BLOOD PRESSURE: 77 MMHG | SYSTOLIC BLOOD PRESSURE: 154 MMHG | BODY MASS INDEX: 37.03 KG/M2 | RESPIRATION RATE: 16 BRPM | OXYGEN SATURATION: 100 % | HEART RATE: 85 BPM | WEIGHT: 236.4 LBS

## 2023-07-02 DIAGNOSIS — R07.81 RIB PAIN ON LEFT SIDE: Primary | ICD-10-CM
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
## 2023-07-02 RX ORDER — CELECOXIB 200 MG/1
CAPSULE ORAL
COMMUNITY
Start: 2023-06-12

## 2023-07-02 RX ORDER — NAPROXEN 500 MG/1
500 TABLET ORAL 2 TIMES DAILY PRN
Qty: 20 TABLET | Refills: 0 | Status: SHIPPED | OUTPATIENT
Start: 2023-07-02

## 2023-07-07 SDOH — ECONOMIC STABILITY: FOOD INSECURITY: WITHIN THE PAST 12 MONTHS, YOU WORRIED THAT YOUR FOOD WOULD RUN OUT BEFORE YOU GOT MONEY TO BUY MORE.: NEVER TRUE

## 2023-07-07 SDOH — ECONOMIC STABILITY: TRANSPORTATION INSECURITY
IN THE PAST 12 MONTHS, HAS LACK OF TRANSPORTATION KEPT YOU FROM MEETINGS, WORK, OR FROM GETTING THINGS NEEDED FOR DAILY LIVING?: NO

## 2023-07-07 SDOH — ECONOMIC STABILITY: FOOD INSECURITY: WITHIN THE PAST 12 MONTHS, THE FOOD YOU BOUGHT JUST DIDN'T LAST AND YOU DIDN'T HAVE MONEY TO GET MORE.: NEVER TRUE

## 2023-07-07 SDOH — ECONOMIC STABILITY: HOUSING INSECURITY
IN THE LAST 12 MONTHS, WAS THERE A TIME WHEN YOU DID NOT HAVE A STEADY PLACE TO SLEEP OR SLEPT IN A SHELTER (INCLUDING NOW)?: NO

## 2023-07-07 SDOH — ECONOMIC STABILITY: INCOME INSECURITY: HOW HARD IS IT FOR YOU TO PAY FOR THE VERY BASICS LIKE FOOD, HOUSING, MEDICAL CARE, AND HEATING?: NOT HARD AT ALL

## 2023-07-10 ENCOUNTER — OFFICE VISIT (OUTPATIENT)
Age: 63
End: 2023-07-10
Payer: COMMERCIAL

## 2023-07-10 VITALS
TEMPERATURE: 97.6 F | HEIGHT: 71 IN | HEART RATE: 89 BPM | DIASTOLIC BLOOD PRESSURE: 80 MMHG | WEIGHT: 239 LBS | BODY MASS INDEX: 33.46 KG/M2 | SYSTOLIC BLOOD PRESSURE: 116 MMHG | OXYGEN SATURATION: 96 % | RESPIRATION RATE: 18 BRPM

## 2023-07-10 DIAGNOSIS — E78.00 PURE HYPERCHOLESTEROLEMIA: ICD-10-CM

## 2023-07-10 DIAGNOSIS — Z00.00 ROUTINE PHYSICAL EXAMINATION: Primary | ICD-10-CM

## 2023-07-10 DIAGNOSIS — E11.9 CONTROLLED TYPE 2 DIABETES MELLITUS WITHOUT COMPLICATION, WITHOUT LONG-TERM CURRENT USE OF INSULIN (HCC): ICD-10-CM

## 2023-07-10 DIAGNOSIS — I10 HYPERTENSION, ESSENTIAL: ICD-10-CM

## 2023-07-10 DIAGNOSIS — M85.89 OSTEOPENIA OF MULTIPLE SITES: ICD-10-CM

## 2023-07-10 DIAGNOSIS — Z12.31 ENCOUNTER FOR SCREENING MAMMOGRAM FOR MALIGNANT NEOPLASM OF BREAST: ICD-10-CM

## 2023-07-10 DIAGNOSIS — Z71.89 ADVANCED CARE PLANNING/COUNSELING DISCUSSION: ICD-10-CM

## 2023-07-10 DIAGNOSIS — Z23 ENCOUNTER FOR IMMUNIZATION: ICD-10-CM

## 2023-07-10 DIAGNOSIS — F41.9 ANXIETY: ICD-10-CM

## 2023-07-10 DIAGNOSIS — E66.9 OBESITY (BMI 30.0-34.9): ICD-10-CM

## 2023-07-10 PROCEDURE — 90715 TDAP VACCINE 7 YRS/> IM: CPT | Performed by: INTERNAL MEDICINE

## 2023-07-10 PROCEDURE — 3074F SYST BP LT 130 MM HG: CPT | Performed by: INTERNAL MEDICINE

## 2023-07-10 PROCEDURE — 3079F DIAST BP 80-89 MM HG: CPT | Performed by: INTERNAL MEDICINE

## 2023-07-10 PROCEDURE — 90471 IMMUNIZATION ADMIN: CPT | Performed by: INTERNAL MEDICINE

## 2023-07-10 PROCEDURE — 99396 PREV VISIT EST AGE 40-64: CPT | Performed by: INTERNAL MEDICINE

## 2023-07-10 SDOH — ECONOMIC STABILITY: INCOME INSECURITY: HOW HARD IS IT FOR YOU TO PAY FOR THE VERY BASICS LIKE FOOD, HOUSING, MEDICAL CARE, AND HEATING?: NOT HARD AT ALL

## 2023-07-10 SDOH — ECONOMIC STABILITY: FOOD INSECURITY: WITHIN THE PAST 12 MONTHS, THE FOOD YOU BOUGHT JUST DIDN'T LAST AND YOU DIDN'T HAVE MONEY TO GET MORE.: NEVER TRUE

## 2023-07-10 SDOH — ECONOMIC STABILITY: FOOD INSECURITY: WITHIN THE PAST 12 MONTHS, YOU WORRIED THAT YOUR FOOD WOULD RUN OUT BEFORE YOU GOT MONEY TO BUY MORE.: NEVER TRUE

## 2023-07-10 ASSESSMENT — ENCOUNTER SYMPTOMS
SHORTNESS OF BREATH: 0
DIARRHEA: 0
BLOOD IN STOOL: 0
COUGH: 0
NAUSEA: 0
CONSTIPATION: 0
VOMITING: 0
ABDOMINAL PAIN: 0

## 2023-07-10 ASSESSMENT — PATIENT HEALTH QUESTIONNAIRE - PHQ9
SUM OF ALL RESPONSES TO PHQ9 QUESTIONS 1 & 2: 0
SUM OF ALL RESPONSES TO PHQ QUESTIONS 1-9: 0
2. FEELING DOWN, DEPRESSED OR HOPELESS: 0
SUM OF ALL RESPONSES TO PHQ QUESTIONS 1-9: 0
1. LITTLE INTEREST OR PLEASURE IN DOING THINGS: 0
SUM OF ALL RESPONSES TO PHQ QUESTIONS 1-9: 0
SUM OF ALL RESPONSES TO PHQ QUESTIONS 1-9: 0

## 2023-07-10 ASSESSMENT — LIFESTYLE VARIABLES
HOW OFTEN DO YOU HAVE A DRINK CONTAINING ALCOHOL: 2-3 TIMES A WEEK
HOW MANY STANDARD DRINKS CONTAINING ALCOHOL DO YOU HAVE ON A TYPICAL DAY: 1 OR 2

## 2023-07-10 NOTE — ASSESSMENT & PLAN NOTE
Not at goal, will repeat labs to verify previous elevated LFT's have resolved (most likely from atorvastatin). We did review starting on pravastatin after these are reviewed and repeat labs in 1 month.

## 2023-07-10 NOTE — PATIENT INSTRUCTIONS
Patient Education        Well Visit, Men 48 to 72: Care Instructions  Overview     Well visits can help you stay healthy. Your doctor has checked your overall health and may have suggested ways to take good care of yourself. Your doctor also may have recommended tests. At home, you can help prevent illness with healthy eating, regular exercise, and other steps. Follow-up care is a key part of your treatment and safety. Be sure to make and go to all appointments, and call your doctor if you are having problems. It's also a good idea to know your test results and keep a list of the medicines you take. How can you care for yourself at home? Get screening tests that you and your doctor decide on. Screening helps find diseases before any symptoms appear. Eat healthy foods. Choose fruits, vegetables, whole grains, protein, and low-fat dairy foods. Limit fat, especially saturated fat. Reduce salt in your diet. Limit alcohol. Have no more than 2 drinks a day or 14 drinks a week. Get at least 30 minutes of exercise on most days of the week. Walking is a good choice. You also may want to do other activities, such as running, swimming, cycling, or playing tennis or team sports. Reach and stay at a healthy weight. This will lower your risk for many problems, such as obesity, diabetes, heart disease, and high blood pressure. Do not smoke. Smoking can make health problems worse. If you need help quitting, talk to your doctor about stop-smoking programs and medicines. These can increase your chances of quitting for good. Care for your mental health. It is easy to get weighed down by worry and stress. Learn strategies to manage stress, like deep breathing and mindfulness, and stay connected with your family and community. If you find you often feel sad or hopeless, talk with your doctor. Treatment can help. Talk to your doctor about whether you have any risk factors for sexually transmitted infections (STIs).  You can help

## 2023-07-10 NOTE — ACP (ADVANCE CARE PLANNING)
Advance Care Planning   Advance Care Planning (ACP) Physician/NP/PA (Provider) Conversation    Date of ACP Conversation: 07/10/23  Persons included in Conversation:  patient  Length of ACP Conversation in minutes: <16 minutes (Non-Billable)    Has ACP document(s) NOT on file - requested patient to provide.   She elects Full Code (Attempt Resuscitation)    The patient has appointed the following active healthcare agents:    Primary Decision Maker: RichardRufino hutchinson - Spouse - [phone]     The Patient has the following current code status:    Code Status: Full Code    Melvi Gonzalez MD

## 2023-07-11 NOTE — ASSESSMENT & PLAN NOTE
stable, needs improvement, I have recommended the following interventions: dietary management education, guidance, and counseling, encourage exercise and medication options reviewed. Would not qualify for meds but with DM could justify GLP1. Will re-evaluate after assisted.

## 2023-07-12 NOTE — PROGRESS NOTES
[facility] records requested
1/10/2024) for Regular follow up. Subjective:   Jesus Elliott is here today for a full physical.    Since last visit:  weight is stable. She is planning on retiring this fall. The computers/phones were down this morning. Health Maintenance  Immunizations:   Covid: guidelines reviewed - will consider  Influenza: up to date   Tetanus: will do today    Shingles: up to date  Pneumonia: n/a  Cancer screening:   Cervical: reviewed guidelines, overdue, declined. Breast: reviewed guidelines, order placed. Colon: reviewed guidelines, up to date    Osteopenia: 6/2/21 - osteopenia    The following acute and/or chronic problems were addressed today:    Endocrine Review  She is seen for diabetes and obesity. Testing: is not performed. She reports medication compliance: n/a - not on medications (diet controlled). Diabetic diet compliance: compliant most of the time. Cardiovascular Review  The patient has hypertension and hyperlipidemia. She was previously started on atorvastatin, tolerated it but had to stop due to elevated LFT's. She reports taking medications as instructed, no medication side effects noted, home BP monitoring in range of 233'B systolic over 96H diastolic. She generally follows a low fat low cholesterol diet, generally follows a low sodium diet, exercises sporadically. Mental Health Review  Patient is seen for anxiety. She plans to retire this fall. Available GAD7 reviewed. Reports experiences the following side effects from the treatment: none. Endocrine Review  She is seen for osteopenia. She is on the following treatment: fosamax  35 mg weekly. She reports compliance with all meds, and denies any med side effects. The following sections were reviewed & updated as appropriate: Problem List, Allergies, PMH, PSH, FH, and SH. Prior to Admission medications    Medication Sig Start Date End Date Taking?  Authorizing Provider   celecoxib (CELEBREX) 200 MG capsule  6/12/23

## 2023-07-14 DIAGNOSIS — R79.89 ELEVATED LFTS: Primary | ICD-10-CM

## 2023-07-14 LAB
ALBUMIN SERPL-MCNC: 3.7 G/DL (ref 3.9–4.9)
ALBUMIN/GLOB SERPL: 0.9 {RATIO} (ref 1.2–2.2)
ALP SERPL-CCNC: 201 IU/L (ref 44–121)
ALT SERPL-CCNC: 46 IU/L (ref 0–32)
AST SERPL-CCNC: 50 IU/L (ref 0–40)
BILIRUB SERPL-MCNC: 0.3 MG/DL (ref 0–1.2)
BUN SERPL-MCNC: 13 MG/DL (ref 8–27)
BUN/CREAT SERPL: 19 (ref 12–28)
CALCIUM SERPL-MCNC: 9.1 MG/DL (ref 8.7–10.3)
CHLORIDE SERPL-SCNC: 100 MMOL/L (ref 96–106)
CO2 SERPL-SCNC: 20 MMOL/L (ref 20–29)
CREAT SERPL-MCNC: 0.67 MG/DL (ref 0.57–1)
EGFRCR SERPLBLD CKD-EPI 2021: 98 ML/MIN/1.73
GLOBULIN SER CALC-MCNC: 4.3 G/DL (ref 1.5–4.5)
GLUCOSE SERPL-MCNC: 82 MG/DL (ref 70–99)
HBA1C MFR BLD: 6.6 % (ref 4.8–5.6)
POTASSIUM SERPL-SCNC: 4.7 MMOL/L (ref 3.5–5.2)
PROT SERPL-MCNC: 8 G/DL (ref 6–8.5)
SODIUM SERPL-SCNC: 135 MMOL/L (ref 134–144)

## 2023-07-14 NOTE — RESULT ENCOUNTER NOTE
Results released to patient via [employer]. All labs are stable or at goal for her, except for elevated LFT's. These improved slightly off atorvastatin but not quite back to baseline. ALT and AST are close, AP still high. Will recommend US.

## 2023-08-07 ENCOUNTER — HOSPITAL ENCOUNTER (OUTPATIENT)
Facility: HOSPITAL | Age: 63
Discharge: HOME | End: 2023-08-10
Attending: INTERNAL MEDICINE
Payer: COMMERCIAL

## 2023-08-07 DIAGNOSIS — R79.89 ELEVATED LFTS: ICD-10-CM

## 2023-08-07 PROCEDURE — 76705 ECHO EXAM OF ABDOMEN: CPT

## 2023-08-09 PROBLEM — K80.20 CALCULUS OF GALLBLADDER WITHOUT CHOLECYSTITIS WITHOUT OBSTRUCTION: Status: ACTIVE | Noted: 2023-08-09

## 2023-08-09 LAB
CHOLEST SERPL-MCNC: 243 MG/DL
GLUCOSE SERPL-MCNC: 117 MG/DL (ref 65–100)
HDLC SERPL-MCNC: 45 MG/DL
LDLC SERPL CALC-MCNC: 136 MG/DL (ref 0–100)
TRIGL SERPL-MCNC: 310 MG/DL

## 2023-08-13 RX ORDER — PAROXETINE HYDROCHLORIDE 40 MG/1
TABLET, FILM COATED ORAL
Qty: 90 TABLET | Refills: 3 | Status: SHIPPED | OUTPATIENT
Start: 2023-08-13

## 2023-08-21 ENCOUNTER — HOSPITAL ENCOUNTER (OUTPATIENT)
Age: 63
Discharge: HOME | End: 2023-08-24
Payer: COMMERCIAL

## 2023-08-21 VITALS — HEIGHT: 70 IN | BODY MASS INDEX: 34.22 KG/M2 | WEIGHT: 239 LBS

## 2023-08-21 DIAGNOSIS — M85.89 OSTEOPENIA OF MULTIPLE SITES: ICD-10-CM

## 2023-08-21 DIAGNOSIS — Z12.31 ENCOUNTER FOR SCREENING MAMMOGRAM FOR MALIGNANT NEOPLASM OF BREAST: ICD-10-CM

## 2023-08-21 PROCEDURE — 77063 BREAST TOMOSYNTHESIS BI: CPT

## 2023-08-21 PROCEDURE — 77080 DXA BONE DENSITY AXIAL: CPT

## 2023-08-22 NOTE — RESULT ENCOUNTER NOTE
Results reviewed. Results released via [employer]   Can't compare due to being imaged on different machines

## 2023-10-23 ENCOUNTER — PATIENT MESSAGE (OUTPATIENT)
Age: 63
End: 2023-10-23

## 2023-10-23 DIAGNOSIS — M15.9 GENERALIZED OSTEOARTHRITIS OF MULTIPLE SITES: ICD-10-CM

## 2023-10-23 DIAGNOSIS — M85.89 OSTEOPENIA OF MULTIPLE SITES: Primary | ICD-10-CM

## 2023-10-24 RX ORDER — CELECOXIB 200 MG/1
200 CAPSULE ORAL DAILY
Qty: 60 CAPSULE | Refills: 2 | Status: SHIPPED | OUTPATIENT
Start: 2023-10-24

## 2023-10-24 RX ORDER — ALENDRONATE SODIUM 35 MG/1
TABLET ORAL
Qty: 12 TABLET | Refills: 3 | Status: SHIPPED | OUTPATIENT
Start: 2023-10-24

## 2023-10-24 NOTE — TELEPHONE ENCOUNTER
No chief complaint on file. Last Appointment with Dr. Merry Carmona:7/10/23   No future appointments.

## 2023-11-25 RX ORDER — OMEPRAZOLE 40 MG/1
40 CAPSULE, DELAYED RELEASE ORAL DAILY
Qty: 90 CAPSULE | Refills: 1 | Status: SHIPPED | OUTPATIENT
Start: 2023-11-25

## 2023-11-25 RX ORDER — LISINOPRIL 20 MG/1
20 TABLET ORAL EVERY MORNING
Qty: 90 TABLET | Refills: 1 | Status: SHIPPED | OUTPATIENT
Start: 2023-11-25

## 2023-11-25 RX ORDER — PAROXETINE HYDROCHLORIDE 40 MG/1
40 TABLET, FILM COATED ORAL EVERY MORNING
Qty: 90 TABLET | Refills: 3 | Status: SHIPPED | OUTPATIENT
Start: 2023-11-25

## 2023-11-25 NOTE — TELEPHONE ENCOUNTER
Future Appointments   Date Time Provider 4600  46[facility]   1/15/2024 11:00 AM Nic Chambers MD [facility] AMB

## 2024-01-15 PROBLEM — S76.319A PARTIAL HAMSTRING TEAR: Status: RESOLVED | Noted: 2022-04-11 | Resolved: 2024-01-15

## 2024-01-30 ENCOUNTER — OFFICE VISIT (OUTPATIENT)
Age: 64
End: 2024-01-30
Payer: MEDICARE

## 2024-01-30 VITALS
WEIGHT: 238.6 LBS | DIASTOLIC BLOOD PRESSURE: 82 MMHG | OXYGEN SATURATION: 95 % | HEART RATE: 102 BPM | SYSTOLIC BLOOD PRESSURE: 115 MMHG | BODY MASS INDEX: 33.4 KG/M2 | TEMPERATURE: 98.2 F | HEIGHT: 71 IN | RESPIRATION RATE: 16 BRPM

## 2024-01-30 DIAGNOSIS — E11.9 CONTROLLED TYPE 2 DIABETES MELLITUS WITHOUT COMPLICATION, WITHOUT LONG-TERM CURRENT USE OF INSULIN (HCC): Primary | ICD-10-CM

## 2024-01-30 DIAGNOSIS — E78.00 PURE HYPERCHOLESTEROLEMIA: ICD-10-CM

## 2024-01-30 DIAGNOSIS — I10 HYPERTENSION, ESSENTIAL: ICD-10-CM

## 2024-01-30 DIAGNOSIS — E66.9 OBESITY (BMI 30.0-34.9): ICD-10-CM

## 2024-01-30 DIAGNOSIS — F41.9 ANXIETY: ICD-10-CM

## 2024-01-30 DIAGNOSIS — E11.9 CONTROLLED TYPE 2 DIABETES MELLITUS WITHOUT COMPLICATION, WITHOUT LONG-TERM CURRENT USE OF INSULIN (HCC): ICD-10-CM

## 2024-01-30 PROCEDURE — 2022F DILAT RTA XM EVC RTNOPTHY: CPT | Performed by: INTERNAL MEDICINE

## 2024-01-30 PROCEDURE — G8399 PT W/DXA RESULTS DOCUMENT: HCPCS | Performed by: INTERNAL MEDICINE

## 2024-01-30 PROCEDURE — 3046F HEMOGLOBIN A1C LEVEL >9.0%: CPT | Performed by: INTERNAL MEDICINE

## 2024-01-30 PROCEDURE — 3017F COLORECTAL CA SCREEN DOC REV: CPT | Performed by: INTERNAL MEDICINE

## 2024-01-30 PROCEDURE — G8417 CALC BMI ABV UP PARAM F/U: HCPCS | Performed by: INTERNAL MEDICINE

## 2024-01-30 PROCEDURE — 3079F DIAST BP 80-89 MM HG: CPT | Performed by: INTERNAL MEDICINE

## 2024-01-30 PROCEDURE — 99214 OFFICE O/P EST MOD 30 MIN: CPT | Performed by: INTERNAL MEDICINE

## 2024-01-30 PROCEDURE — G8427 DOCREV CUR MEDS BY ELIG CLIN: HCPCS | Performed by: INTERNAL MEDICINE

## 2024-01-30 PROCEDURE — 1036F TOBACCO NON-USER: CPT | Performed by: INTERNAL MEDICINE

## 2024-01-30 PROCEDURE — 1123F ACP DISCUSS/DSCN MKR DOCD: CPT | Performed by: INTERNAL MEDICINE

## 2024-01-30 PROCEDURE — G8484 FLU IMMUNIZE NO ADMIN: HCPCS | Performed by: INTERNAL MEDICINE

## 2024-01-30 PROCEDURE — 3074F SYST BP LT 130 MM HG: CPT | Performed by: INTERNAL MEDICINE

## 2024-01-30 PROCEDURE — 1090F PRES/ABSN URINE INCON ASSESS: CPT | Performed by: INTERNAL MEDICINE

## 2024-01-30 RX ORDER — SEMAGLUTIDE 0.25 MG/.5ML
0.25 INJECTION, SOLUTION SUBCUTANEOUS
Qty: 2 ML | Refills: 0 | Status: SHIPPED | OUTPATIENT
Start: 2024-01-30

## 2024-01-30 ASSESSMENT — PATIENT HEALTH QUESTIONNAIRE - PHQ9
2. FEELING DOWN, DEPRESSED OR HOPELESS: 0
SUM OF ALL RESPONSES TO PHQ9 QUESTIONS 1 & 2: 0
SUM OF ALL RESPONSES TO PHQ QUESTIONS 1-9: 0
1. LITTLE INTEREST OR PLEASURE IN DOING THINGS: 0
SUM OF ALL RESPONSES TO PHQ QUESTIONS 1-9: 0

## 2024-01-30 ASSESSMENT — ENCOUNTER SYMPTOMS
NAUSEA: 0
VOMITING: 0
BLOOD IN STOOL: 0
DIARRHEA: 0
COUGH: 0
ABDOMINAL PAIN: 0
SHORTNESS OF BREATH: 0
CONSTIPATION: 0

## 2024-01-30 NOTE — ASSESSMENT & PLAN NOTE
well controlled, we did review when to consider lowering to 20mg, she wants to stick with 40mg for now but will notify me if she changes her mind.

## 2024-01-30 NOTE — ASSESSMENT & PLAN NOTE
Likely not at goal, off medications.  Will repeat labs and if LFT's are back to baseline will plan on starting pravastatin.  She has a lab slip and will repeat LFT's in 1 month after starting medication

## 2024-01-30 NOTE — ASSESSMENT & PLAN NOTE
well controlled, continue current treatment pending review of labs except will start on GLP1 to help improve sugars and weight.  She reports last eye exam was at [facility], records requested   Continue Regimen: Clotrimazole 1% topical cream- apply to affected areas twice daily Detail Level: Zone

## 2024-01-30 NOTE — PROGRESS NOTES
Alissa Bearden is a 63 y.o. female who was seen in clinic today (1/30/2024).    Assessment & Plan:   Below is the assessment and plan developed based on review of pertinent history, physical exam, labs, studies, and medications.    1. Controlled type 2 diabetes mellitus without complication, without long-term current use of insulin ([facility])  Assessment & Plan:  well controlled, continue current treatment pending review of labs except will start on GLP1 to help improve sugars and weight.  She reports last eye exam was at [facility], records requested  Orders:  -     Comprehensive Metabolic Panel; Future  -     CBC; Future  -     Hemoglobin A1C; Future  -     Microalbumin / Creatinine Urine Ratio; Future  -     Semaglutide-Weight Management (WEGOVY) 0.25 MG/0.5ML SOAJ SC injection; Inject 0.25 mg into the skin every 7 days, Disp-2 mL, R-0Normal  2. Obesity (BMI 30.0-34.9)  3. Hypertension, essential  Assessment & Plan:  well controlled, continue current treatment pending review of labs    Orders:  -     Comprehensive Metabolic Panel; Future  -     CBC; Future  4. Pure hypercholesterolemia  Assessment & Plan:  Likely not at goal, off medications.  Will repeat labs and if LFT's are back to baseline will plan on starting pravastatin.  She has a lab slip and will repeat LFT's in 1 month after starting medication   Orders:  -     Comprehensive Metabolic Panel; Future  -     CBC; Future  -     Hepatic Function Panel; Future  5. Anxiety  Assessment & Plan:  well controlled, we did review when to consider lowering to 20mg, she wants to stick with 40mg for now but will notify me if she changes her mind.      Return in about 6 months (around 7/30/2024) for FULL PHYSICAL.   Subjective/Objective:   Alissa was seen today for Follow-up     Since last visit: weight is stable.     Endocrine Review  She is seen for diabetes and obesity.  Testing: is not performed.  She reports medication compliance: n/a - not on medications (diet controlled).

## 2024-02-02 LAB
ALBUMIN SERPL-MCNC: 3.9 G/DL (ref 3.9–4.9)
ALBUMIN/CREAT UR: <4 MG/G CREAT (ref 0–29)
ALBUMIN/GLOB SERPL: 0.9 {RATIO} (ref 1.2–2.2)
ALP SERPL-CCNC: 161 IU/L (ref 44–121)
ALT SERPL-CCNC: 40 IU/L (ref 0–32)
AST SERPL-CCNC: 35 IU/L (ref 0–40)
BILIRUB SERPL-MCNC: 0.3 MG/DL (ref 0–1.2)
BUN SERPL-MCNC: 10 MG/DL (ref 8–27)
BUN/CREAT SERPL: 13 (ref 12–28)
CALCIUM SERPL-MCNC: 9.2 MG/DL (ref 8.7–10.3)
CHLORIDE SERPL-SCNC: 99 MMOL/L (ref 96–106)
CO2 SERPL-SCNC: 23 MMOL/L (ref 20–29)
CREAT SERPL-MCNC: 0.79 MG/DL (ref 0.57–1)
CREAT UR-MCNC: 73 MG/DL
EGFRCR SERPLBLD CKD-EPI 2021: 83 ML/MIN/1.73
ERYTHROCYTE [DISTWIDTH] IN BLOOD BY AUTOMATED COUNT: 13.1 % (ref 11.7–15.4)
GLOBULIN SER CALC-MCNC: 4.2 G/DL (ref 1.5–4.5)
GLUCOSE SERPL-MCNC: 126 MG/DL (ref 70–99)
HBA1C MFR BLD: 6.8 % (ref 4.8–5.6)
HCT VFR BLD AUTO: 39.2 % (ref 34–46.6)
HGB BLD-MCNC: 13 G/DL (ref 11.1–15.9)
MCH RBC QN AUTO: 28.1 PG (ref 26.6–33)
MCHC RBC AUTO-ENTMCNC: 33.2 G/DL (ref 31.5–35.7)
MCV RBC AUTO: 85 FL (ref 79–97)
MICROALBUMIN UR-MCNC: <3 UG/ML
PLATELET # BLD AUTO: 388 X10E3/UL (ref 150–450)
POTASSIUM SERPL-SCNC: 4.7 MMOL/L (ref 3.5–5.2)
PROT SERPL-MCNC: 8.1 G/DL (ref 6–8.5)
RBC # BLD AUTO: 4.63 X10E6/UL (ref 3.77–5.28)
SODIUM SERPL-SCNC: 136 MMOL/L (ref 134–144)
WBC # BLD AUTO: 6.7 X10E3/UL (ref 3.4–10.8)

## 2024-02-02 NOTE — RESULT ENCOUNTER NOTE
Results released to patient via [employer].  All labs are stable or at goal for her. DM control is worse but still acceptable.  LFT's improved.

## 2024-02-09 DIAGNOSIS — M15.9 GENERALIZED OSTEOARTHRITIS OF MULTIPLE SITES: ICD-10-CM

## 2024-02-09 RX ORDER — LISINOPRIL 20 MG/1
20 TABLET ORAL EVERY MORNING
Qty: 90 TABLET | Refills: 3 | Status: SHIPPED | OUTPATIENT
Start: 2024-02-09

## 2024-02-09 RX ORDER — PAROXETINE HYDROCHLORIDE 40 MG/1
40 TABLET, FILM COATED ORAL EVERY MORNING
Qty: 90 TABLET | Refills: 3 | Status: SHIPPED | OUTPATIENT
Start: 2024-02-09

## 2024-02-09 RX ORDER — CELECOXIB 200 MG/1
200 CAPSULE ORAL DAILY
Qty: 180 CAPSULE | Refills: 1 | Status: SHIPPED | OUTPATIENT
Start: 2024-02-09

## 2024-02-09 NOTE — TELEPHONE ENCOUNTER
Future Appointments   Date Time Provider Department Center   7/9/2024 10:00 AM Lexa Carmona MD [facility]

## 2024-02-29 DIAGNOSIS — E78.00 PURE HYPERCHOLESTEROLEMIA: ICD-10-CM

## 2024-06-18 RX ORDER — OMEPRAZOLE 40 MG/1
40 CAPSULE, DELAYED RELEASE ORAL DAILY
Qty: 90 CAPSULE | Refills: 0 | Status: SHIPPED | OUTPATIENT
Start: 2024-06-18

## 2024-06-18 NOTE — TELEPHONE ENCOUNTER
Chief Complaint   Patient presents with    Medication Refill     Last Appointment with Dr. Lyn:  1/30/2024   Future Appointments   Date Time Provider Department Center   7/9/2024 10:00 AM Lexa Carmona MD [facility]

## 2024-07-09 ENCOUNTER — OFFICE VISIT (OUTPATIENT)
Age: 64
End: 2024-07-09
Payer: MEDICARE

## 2024-07-09 VITALS
WEIGHT: 237.8 LBS | TEMPERATURE: 97.5 F | DIASTOLIC BLOOD PRESSURE: 81 MMHG | RESPIRATION RATE: 16 BRPM | BODY MASS INDEX: 37.32 KG/M2 | OXYGEN SATURATION: 96 % | HEART RATE: 80 BPM | SYSTOLIC BLOOD PRESSURE: 118 MMHG | HEIGHT: 67 IN

## 2024-07-09 DIAGNOSIS — N95.1 MENOPAUSAL SYNDROME (HOT FLASHES): ICD-10-CM

## 2024-07-09 DIAGNOSIS — K21.9 GASTROESOPHAGEAL REFLUX DISEASE WITHOUT ESOPHAGITIS: ICD-10-CM

## 2024-07-09 DIAGNOSIS — Z53.20 CERVICAL CANCER SCREENING DECLINED: ICD-10-CM

## 2024-07-09 DIAGNOSIS — E78.00 PURE HYPERCHOLESTEROLEMIA: ICD-10-CM

## 2024-07-09 DIAGNOSIS — E11.9 CONTROLLED TYPE 2 DIABETES MELLITUS WITHOUT COMPLICATION, WITHOUT LONG-TERM CURRENT USE OF INSULIN (HCC): ICD-10-CM

## 2024-07-09 DIAGNOSIS — Z00.00 WELCOME TO MEDICARE PREVENTIVE VISIT: Primary | ICD-10-CM

## 2024-07-09 DIAGNOSIS — F41.9 ANXIETY: ICD-10-CM

## 2024-07-09 DIAGNOSIS — E66.01 OBESITY, MORBID (HCC): ICD-10-CM

## 2024-07-09 DIAGNOSIS — Z23 ENCOUNTER FOR IMMUNIZATION: ICD-10-CM

## 2024-07-09 DIAGNOSIS — Z71.89 ADVANCED CARE PLANNING/COUNSELING DISCUSSION: ICD-10-CM

## 2024-07-09 DIAGNOSIS — I10 HYPERTENSION, ESSENTIAL: ICD-10-CM

## 2024-07-09 LAB
ALBUMIN SERPL-MCNC: 3.5 G/DL (ref 3.5–5)
ALBUMIN/GLOB SERPL: 0.7 (ref 1.1–2.2)
ALP SERPL-CCNC: 147 U/L (ref 45–117)
ALT SERPL-CCNC: 51 U/L (ref 12–78)
ANION GAP SERPL CALC-SCNC: 3 MMOL/L (ref 5–15)
AST SERPL-CCNC: 40 U/L (ref 15–37)
BILIRUB SERPL-MCNC: 0.5 MG/DL (ref 0.2–1)
BUN SERPL-MCNC: 15 MG/DL (ref 6–20)
BUN/CREAT SERPL: 18 (ref 12–20)
CALCIUM SERPL-MCNC: 9.4 MG/DL (ref 8.5–10.1)
CHLORIDE SERPL-SCNC: 105 MMOL/L (ref 97–108)
CHOLEST SERPL-MCNC: 245 MG/DL
CO2 SERPL-SCNC: 27 MMOL/L (ref 21–32)
CREAT SERPL-MCNC: 0.83 MG/DL (ref 0.55–1.02)
ERYTHROCYTE [DISTWIDTH] IN BLOOD BY AUTOMATED COUNT: 13.7 % (ref 11.5–14.5)
EST. AVERAGE GLUCOSE BLD GHB EST-MCNC: 137 MG/DL
GLOBULIN SER CALC-MCNC: 4.9 G/DL (ref 2–4)
GLUCOSE SERPL-MCNC: 117 MG/DL (ref 65–100)
HBA1C MFR BLD: 6.4 % (ref 4–5.6)
HCT VFR BLD AUTO: 40.4 % (ref 35–47)
HDLC SERPL-MCNC: 43 MG/DL
HDLC SERPL: 5.7 (ref 0–5)
HGB BLD-MCNC: 12.9 G/DL (ref 11.5–16)
LDLC SERPL CALC-MCNC: 144.8 MG/DL (ref 0–100)
MCH RBC QN AUTO: 28.7 PG (ref 26–34)
MCHC RBC AUTO-ENTMCNC: 31.9 G/DL (ref 30–36.5)
MCV RBC AUTO: 89.8 FL (ref 80–99)
NRBC # BLD: 0 K/UL (ref 0–0.01)
NRBC BLD-RTO: 0 PER 100 WBC
PLATELET # BLD AUTO: 388 K/UL (ref 150–400)
PMV BLD AUTO: 10.8 FL (ref 8.9–12.9)
POTASSIUM SERPL-SCNC: 4.5 MMOL/L (ref 3.5–5.1)
PROT SERPL-MCNC: 8.4 G/DL (ref 6.4–8.2)
RBC # BLD AUTO: 4.5 M/UL (ref 3.8–5.2)
SODIUM SERPL-SCNC: 135 MMOL/L (ref 136–145)
TRIGL SERPL-MCNC: 286 MG/DL
VLDLC SERPL CALC-MCNC: 57.2 MG/DL
WBC # BLD AUTO: 6.8 K/UL (ref 3.6–11)

## 2024-07-09 PROCEDURE — 99214 OFFICE O/P EST MOD 30 MIN: CPT | Performed by: INTERNAL MEDICINE

## 2024-07-09 PROCEDURE — 3017F COLORECTAL CA SCREEN DOC REV: CPT | Performed by: INTERNAL MEDICINE

## 2024-07-09 PROCEDURE — 2022F DILAT RTA XM EVC RTNOPTHY: CPT | Performed by: INTERNAL MEDICINE

## 2024-07-09 PROCEDURE — 3044F HG A1C LEVEL LT 7.0%: CPT | Performed by: INTERNAL MEDICINE

## 2024-07-09 PROCEDURE — 1123F ACP DISCUSS/DSCN MKR DOCD: CPT | Performed by: INTERNAL MEDICINE

## 2024-07-09 PROCEDURE — G8417 CALC BMI ABV UP PARAM F/U: HCPCS | Performed by: INTERNAL MEDICINE

## 2024-07-09 PROCEDURE — 3079F DIAST BP 80-89 MM HG: CPT | Performed by: INTERNAL MEDICINE

## 2024-07-09 PROCEDURE — G8427 DOCREV CUR MEDS BY ELIG CLIN: HCPCS | Performed by: INTERNAL MEDICINE

## 2024-07-09 PROCEDURE — 3074F SYST BP LT 130 MM HG: CPT | Performed by: INTERNAL MEDICINE

## 2024-07-09 PROCEDURE — 1090F PRES/ABSN URINE INCON ASSESS: CPT | Performed by: INTERNAL MEDICINE

## 2024-07-09 PROCEDURE — G8399 PT W/DXA RESULTS DOCUMENT: HCPCS | Performed by: INTERNAL MEDICINE

## 2024-07-09 PROCEDURE — G0403 EKG FOR INITIAL PREVENT EXAM: HCPCS | Performed by: INTERNAL MEDICINE

## 2024-07-09 PROCEDURE — G0402 INITIAL PREVENTIVE EXAM: HCPCS | Performed by: INTERNAL MEDICINE

## 2024-07-09 PROCEDURE — 1036F TOBACCO NON-USER: CPT | Performed by: INTERNAL MEDICINE

## 2024-07-09 RX ORDER — OMEPRAZOLE 20 MG/1
20 CAPSULE, DELAYED RELEASE ORAL DAILY
Qty: 90 CAPSULE | Refills: 3 | Status: SHIPPED | OUTPATIENT
Start: 2024-07-09

## 2024-07-09 SDOH — ECONOMIC STABILITY: FOOD INSECURITY: WITHIN THE PAST 12 MONTHS, YOU WORRIED THAT YOUR FOOD WOULD RUN OUT BEFORE YOU GOT MONEY TO BUY MORE.: NEVER TRUE

## 2024-07-09 SDOH — ECONOMIC STABILITY: INCOME INSECURITY: HOW HARD IS IT FOR YOU TO PAY FOR THE VERY BASICS LIKE FOOD, HOUSING, MEDICAL CARE, AND HEATING?: NOT HARD AT ALL

## 2024-07-09 SDOH — ECONOMIC STABILITY: FOOD INSECURITY: WITHIN THE PAST 12 MONTHS, THE FOOD YOU BOUGHT JUST DIDN'T LAST AND YOU DIDN'T HAVE MONEY TO GET MORE.: NEVER TRUE

## 2024-07-09 ASSESSMENT — PATIENT HEALTH QUESTIONNAIRE - PHQ9
SUM OF ALL RESPONSES TO PHQ QUESTIONS 1-9: 0
1. LITTLE INTEREST OR PLEASURE IN DOING THINGS: NOT AT ALL
SUM OF ALL RESPONSES TO PHQ QUESTIONS 1-9: 0
SUM OF ALL RESPONSES TO PHQ9 QUESTIONS 1 & 2: 0
2. FEELING DOWN, DEPRESSED OR HOPELESS: NOT AT ALL

## 2024-07-09 ASSESSMENT — ENCOUNTER SYMPTOMS
NAUSEA: 0
CONSTIPATION: 0
ABDOMINAL PAIN: 0
BLOOD IN STOOL: 0
COUGH: 0
SHORTNESS OF BREATH: 0
VOMITING: 0
DIARRHEA: 0

## 2024-07-09 ASSESSMENT — LIFESTYLE VARIABLES
HOW MANY STANDARD DRINKS CONTAINING ALCOHOL DO YOU HAVE ON A TYPICAL DAY: 3 OR 4
HOW OFTEN DO YOU HAVE A DRINK CONTAINING ALCOHOL: 2-4 TIMES A MONTH

## 2024-07-09 ASSESSMENT — VISUAL ACUITY
OS_CC: 20/25
OD_CC: 20/25

## 2024-07-09 NOTE — ACP (ADVANCE CARE PLANNING)
Advance Care Planning   Advance Care Planning (ACP) Physician/NP/PA (Provider) Conversation    Date of ACP Conversation: 07/09/24  Persons included in Conversation:  patient  Length of ACP Conversation in minutes: <16 minutes (Non-Billable)    We discussed the patient’s choices for care and treatment preferences in case of a health event that adversely affects decision-making abilities or is life-limiting. We discusses the differences between FULL CODE and DNR and when to consider a change in code status.  The limitations with CPR were reviewed.    Has ACP document(s) NOT on file - requested patient to provide.  She elects Full Code (Attempt Resuscitation)    The patient has appointed the following active healthcare agents:    Primary Decision Maker: Rufino Richard - [facility] - [phone]     The Patient has the following current code status:    Code Status: Full Code    Lexa Carmona MD

## 2024-07-09 NOTE — ASSESSMENT & PLAN NOTE
well controlled, she is also using SSRI for hot flashes, we did review reducing dose.  She will try reducing to 1/2 tab and re-evaluate.

## 2024-07-09 NOTE — ASSESSMENT & PLAN NOTE
Stable and poorly controlled, needs improvement. I have recommended the following interventions: dietary management education, guidance, and counseling, encourage exercise and medication options reviewed.  Will start on GLP1 to treat DM and weight.  We discussed medication options, expectations, and side effects..

## 2024-07-09 NOTE — ASSESSMENT & PLAN NOTE
at goal, continue current treatment pending review of labs but will plan on starting GLP1 to help with weight and diabetes.  Release sent to [facility] for records

## 2024-07-09 NOTE — PROGRESS NOTES
Medicare Annual Wellness Visit    Alissa Bearden is here for Medicare AWV    Assessment & Plan   Welcome to Medicare preventive visit  -     AMB POC EKG ROUTINE W/ 12 LEADS, SCREEN ()  Advanced care planning/counseling discussion  Encounter for immunization  -     pneumococcal 20-valent conjugat (PREVNAR) 0.5 ML CATA inj; Inject 0.5 mLs into the muscle once for 1 dose, Disp-0.5 mL, R-0Print  Cervical cancer screening declined  Controlled type 2 diabetes mellitus without complication, without long-term current use of insulin (HCC)  Assessment & Plan:  at goal, continue current treatment pending review of labs but will plan on starting GLP1 to help with weight and diabetes.  Release sent to [facility] for records     Orders:  -     Semaglutide,0.25 or 0.5MG/DOS, 2 MG/3ML SOPN; Inject 0.25 mg into the skin every 7 days, Disp-3 mL, R-1Normal  -     Comprehensive Metabolic Panel; Future  -     CBC; Future  -     Hemoglobin A1C; Future  -     Lipid Panel; Future  Obesity, morbid (HCC)  Assessment & Plan:  Stable and poorly controlled, needs improvement. I have recommended the following interventions: dietary management education, guidance, and counseling, encourage exercise and medication options reviewed.  Will start on GLP1 to treat DM and weight.  We discussed medication options, expectations, and side effects..    Orders:  -     Semaglutide,0.25 or 0.5MG/DOS, 2 MG/3ML SOPN; Inject 0.25 mg into the skin every 7 days, Disp-3 mL, R-1Normal  Hypertension, essential  Assessment & Plan:  well controlled, continue current treatment pending review of labs    Orders:  -     Comprehensive Metabolic Panel; Future  Pure hypercholesterolemia  Assessment & Plan:  Not at goal, off medications.  Was on atorvastatin but had issues w/ elevated LFT's.  Recommended trial of one more statin before giving up on the class all together.  Rational for treatment and treatment goals reviewed.   Orders:  -     Comprehensive Metabolic Panel;

## 2024-07-09 NOTE — ASSESSMENT & PLAN NOTE
Nikolas, still having some breakthrough but mostly well controlled, will try to reduce SSRI to 1/2 tab to see how much it is helping

## 2024-07-09 NOTE — ASSESSMENT & PLAN NOTE
Not at goal, off medications.  Was on atorvastatin but had issues w/ elevated LFT's.  Recommended trial of one more statin before giving up on the class all together.  Rational for treatment and treatment goals reviewed.

## 2024-07-09 NOTE — ASSESSMENT & PLAN NOTE
Chronic issue, well controlled, we reviewed the concerns with long term PPI use.  We will try to reduce to 20mg while she works on diet and avoiding triggers. We also discussed when to consider Pepcid. She will update me in a few weeks.

## 2024-07-10 NOTE — RESULT ENCOUNTER NOTE
Results released to patient via [employer].  All labs are stable or at goal for her.  Lipids are above goal, will recommend trial of another statin or zetia.  LFT elevation are stable, fatty liver related.  DM control improved slightly.  Na runs low to low/normal, will monitor

## 2024-08-30 ENCOUNTER — TRANSCRIBE ORDERS (OUTPATIENT)
Facility: HOSPITAL | Age: 64
End: 2024-08-30

## 2024-08-30 DIAGNOSIS — Z12.31 ENCOUNTER FOR MAMMOGRAM TO ESTABLISH BASELINE MAMMOGRAM: Primary | ICD-10-CM

## 2024-09-05 ENCOUNTER — HOSPITAL ENCOUNTER (OUTPATIENT)
Age: 64
Discharge: HOME | End: 2024-09-08
Payer: MEDICARE

## 2024-09-05 VITALS — BODY MASS INDEX: 34.53 KG/M2 | WEIGHT: 220 LBS | HEIGHT: 67 IN

## 2024-09-05 DIAGNOSIS — Z12.31 ENCOUNTER FOR MAMMOGRAM TO ESTABLISH BASELINE MAMMOGRAM: ICD-10-CM

## 2024-09-05 PROCEDURE — 77067 SCR MAMMO BI INCL CAD: CPT

## 2024-10-02 DIAGNOSIS — E66.01 OBESITY, MORBID: ICD-10-CM

## 2024-10-02 DIAGNOSIS — E11.9 CONTROLLED TYPE 2 DIABETES MELLITUS WITHOUT COMPLICATION, WITHOUT LONG-TERM CURRENT USE OF INSULIN (HCC): ICD-10-CM

## 2024-10-03 NOTE — TELEPHONE ENCOUNTER
Chief Complaint   Patient presents with    Medication Refill     Last Appointment with Dr. Lexa Carmona:  7/9/2024   Future Appointments   Date Time Provider Department Center   12/16/2024  9:00 AM Lexa Carmona MD [facility] DEP

## 2024-10-22 DIAGNOSIS — M85.89 OSTEOPENIA OF MULTIPLE SITES: ICD-10-CM

## 2024-10-22 RX ORDER — ALENDRONATE SODIUM 35 MG/1
TABLET ORAL
Qty: 12 TABLET | Refills: 0 | Status: SHIPPED | OUTPATIENT
Start: 2024-10-22

## 2024-10-22 NOTE — TELEPHONE ENCOUNTER
Chief Complaint   Patient presents with    Medication Refill     Last Appointment with Dr. Lexa Carmona:  7/9/2024   Future Appointments   Date Time Provider Department Center   12/16/2024  9:00 AM Lexa Carmona MD [facility] DEP   [facility]

## 2024-11-27 DIAGNOSIS — E11.9 CONTROLLED TYPE 2 DIABETES MELLITUS WITHOUT COMPLICATION, WITHOUT LONG-TERM CURRENT USE OF INSULIN (HCC): ICD-10-CM

## 2024-11-27 DIAGNOSIS — E66.01 OBESITY, MORBID: ICD-10-CM

## 2024-11-27 NOTE — TELEPHONE ENCOUNTER
Chief Complaint   Patient presents with    Medication Refill     Last Appointment with Dr. Lexa Carmona:  7/9/2024   Future Appointments   Date Time Provider Department Center   12/16/2024  9:00 AM Lexa Carmona MD [facility] DEP

## 2024-12-01 RX ORDER — SEMAGLUTIDE 1.34 MG/ML
INJECTION, SOLUTION SUBCUTANEOUS
Qty: 3 ML | Refills: 1 | Status: SHIPPED | OUTPATIENT
Start: 2024-12-01

## 2024-12-20 ENCOUNTER — OFFICE VISIT (OUTPATIENT)
Age: 64
End: 2024-12-20
Payer: MEDICARE

## 2024-12-20 VITALS
TEMPERATURE: 97.7 F | SYSTOLIC BLOOD PRESSURE: 120 MMHG | OXYGEN SATURATION: 97 % | WEIGHT: 208.8 LBS | HEIGHT: 67 IN | RESPIRATION RATE: 16 BRPM | BODY MASS INDEX: 32.77 KG/M2 | DIASTOLIC BLOOD PRESSURE: 81 MMHG | HEART RATE: 61 BPM

## 2024-12-20 DIAGNOSIS — Z01.818 PRE-OP EXAMINATION: ICD-10-CM

## 2024-12-20 DIAGNOSIS — E11.9 CONTROLLED TYPE 2 DIABETES MELLITUS WITHOUT COMPLICATION, WITHOUT LONG-TERM CURRENT USE OF INSULIN (HCC): ICD-10-CM

## 2024-12-20 DIAGNOSIS — M79.671 RIGHT FOOT PAIN: Primary | ICD-10-CM

## 2024-12-20 DIAGNOSIS — E66.811 OBESITY (BMI 30.0-34.9): ICD-10-CM

## 2024-12-20 LAB
ALBUMIN SERPL-MCNC: 3.7 G/DL (ref 3.5–5)
ALBUMIN/GLOB SERPL: 0.8 (ref 1.1–2.2)
ALP SERPL-CCNC: 150 U/L (ref 45–117)
ALT SERPL-CCNC: 52 U/L (ref 12–78)
ANION GAP SERPL CALC-SCNC: 8 MMOL/L (ref 2–12)
AST SERPL-CCNC: 40 U/L (ref 15–37)
BILIRUB SERPL-MCNC: 0.6 MG/DL (ref 0.2–1)
BUN SERPL-MCNC: 12 MG/DL (ref 6–20)
BUN/CREAT SERPL: 15 (ref 12–20)
CALCIUM SERPL-MCNC: 9.3 MG/DL (ref 8.5–10.1)
CHLORIDE SERPL-SCNC: 104 MMOL/L (ref 97–108)
CO2 SERPL-SCNC: 23 MMOL/L (ref 21–32)
CREAT SERPL-MCNC: 0.82 MG/DL (ref 0.55–1.02)
CREAT UR-MCNC: 50.1 MG/DL
ERYTHROCYTE [DISTWIDTH] IN BLOOD BY AUTOMATED COUNT: 13.9 % (ref 11.5–14.5)
EST. AVERAGE GLUCOSE BLD GHB EST-MCNC: 108 MG/DL
GLOBULIN SER CALC-MCNC: 4.7 G/DL (ref 2–4)
GLUCOSE SERPL-MCNC: 99 MG/DL (ref 65–100)
HBA1C MFR BLD: 5.4 % (ref 4–5.6)
HCT VFR BLD AUTO: 42.7 % (ref 35–47)
HGB BLD-MCNC: 13.6 G/DL (ref 11.5–16)
MCH RBC QN AUTO: 28.6 PG (ref 26–34)
MCHC RBC AUTO-ENTMCNC: 31.9 G/DL (ref 30–36.5)
MCV RBC AUTO: 89.9 FL (ref 80–99)
MICROALBUMIN UR-MCNC: 0.87 MG/DL
MICROALBUMIN/CREAT UR-RTO: 17 MG/G (ref 0–30)
NRBC # BLD: 0 K/UL (ref 0–0.01)
NRBC BLD-RTO: 0 PER 100 WBC
PLATELET # BLD AUTO: 392 K/UL (ref 150–400)
PMV BLD AUTO: 10.2 FL (ref 8.9–12.9)
POTASSIUM SERPL-SCNC: 4.2 MMOL/L (ref 3.5–5.1)
PROT SERPL-MCNC: 8.4 G/DL (ref 6.4–8.2)
RBC # BLD AUTO: 4.75 M/UL (ref 3.8–5.2)
SODIUM SERPL-SCNC: 135 MMOL/L (ref 136–145)
WBC # BLD AUTO: 7.1 K/UL (ref 3.6–11)

## 2024-12-20 PROCEDURE — 99213 OFFICE O/P EST LOW 20 MIN: CPT | Performed by: INTERNAL MEDICINE

## 2024-12-20 PROCEDURE — 3079F DIAST BP 80-89 MM HG: CPT | Performed by: INTERNAL MEDICINE

## 2024-12-20 PROCEDURE — 1036F TOBACCO NON-USER: CPT | Performed by: INTERNAL MEDICINE

## 2024-12-20 PROCEDURE — G8417 CALC BMI ABV UP PARAM F/U: HCPCS | Performed by: INTERNAL MEDICINE

## 2024-12-20 PROCEDURE — 1159F MED LIST DOCD IN RCRD: CPT | Performed by: INTERNAL MEDICINE

## 2024-12-20 PROCEDURE — 3044F HG A1C LEVEL LT 7.0%: CPT | Performed by: INTERNAL MEDICINE

## 2024-12-20 PROCEDURE — 1160F RVW MEDS BY RX/DR IN RCRD: CPT | Performed by: INTERNAL MEDICINE

## 2024-12-20 PROCEDURE — 1126F AMNT PAIN NOTED NONE PRSNT: CPT | Performed by: INTERNAL MEDICINE

## 2024-12-20 PROCEDURE — G8427 DOCREV CUR MEDS BY ELIG CLIN: HCPCS | Performed by: INTERNAL MEDICINE

## 2024-12-20 PROCEDURE — 3017F COLORECTAL CA SCREEN DOC REV: CPT | Performed by: INTERNAL MEDICINE

## 2024-12-20 PROCEDURE — 2022F DILAT RTA XM EVC RTNOPTHY: CPT | Performed by: INTERNAL MEDICINE

## 2024-12-20 PROCEDURE — 1090F PRES/ABSN URINE INCON ASSESS: CPT | Performed by: INTERNAL MEDICINE

## 2024-12-20 PROCEDURE — 1123F ACP DISCUSS/DSCN MKR DOCD: CPT | Performed by: INTERNAL MEDICINE

## 2024-12-20 PROCEDURE — 3074F SYST BP LT 130 MM HG: CPT | Performed by: INTERNAL MEDICINE

## 2024-12-20 PROCEDURE — G8399 PT W/DXA RESULTS DOCUMENT: HCPCS | Performed by: INTERNAL MEDICINE

## 2024-12-20 PROCEDURE — G8484 FLU IMMUNIZE NO ADMIN: HCPCS | Performed by: INTERNAL MEDICINE

## 2024-12-20 ASSESSMENT — ENCOUNTER SYMPTOMS
DIARRHEA: 0
NAUSEA: 0
CONSTIPATION: 0
ABDOMINAL PAIN: 0
VOMITING: 0
SHORTNESS OF BREATH: 0
BLOOD IN STOOL: 0
COUGH: 0

## 2024-12-20 ASSESSMENT — PATIENT HEALTH QUESTIONNAIRE - PHQ9
SUM OF ALL RESPONSES TO PHQ QUESTIONS 1-9: 0
SUM OF ALL RESPONSES TO PHQ QUESTIONS 1-9: 0
1. LITTLE INTEREST OR PLEASURE IN DOING THINGS: NOT AT ALL
SUM OF ALL RESPONSES TO PHQ QUESTIONS 1-9: 0
SUM OF ALL RESPONSES TO PHQ9 QUESTIONS 1 & 2: 0
SUM OF ALL RESPONSES TO PHQ QUESTIONS 1-9: 0
2. FEELING DOWN, DEPRESSED OR HOPELESS: NOT AT ALL

## 2024-12-20 NOTE — PROGRESS NOTES
Pt pre-op exam note faxed to [facility]/ Dr. Bloom for surgery per MD request. Fax # [phone]. Confirmation received.   
normal sinus rhythm  Labs: BMP and CBC as requested by surgeon.      Pre-Operative Risk Assessment Using 2014 ACC/AHA Guidelines   Emergent procedure: No  Risk Level of Procedure: Low Risk (endoscopy, superficial skin, breast, ambulatory, or cataract, etc.)  Active Cardiac Condition including decompensated HF, Arrhythmia, MI <3 weeks, severe valve disease: No  Revised Cardiac Risk Index Risk factors: None  Measurement of Exercise Tolerance before Surgery is >4 METS (climbing > 1 flight of stairs without stopping, walking up hill > 1-2 blocks, scrubbing floors, moving furniture, golf, bowling, dancing or tennis, or running short distance): Yes      Obstructive Sleep Apnea (CE) Risk  Male: No Hypertension: Yes   BMI > 35: No Age > 50: Yes   Neck Circumference > 16\" (female): No Snoring: No   Tired (daytime fatigue): No Observed Apnea: No     Total Score: 2.  (Score of 3 or more indicates high likelihood of CE).       Review of Systems   Constitutional:  Negative for chills and fever.        Weight is down 29 lbs since last visit due to Ozempic!   Respiratory:  Negative for cough and shortness of breath.    Cardiovascular:  Negative for chest pain and palpitations.   Gastrointestinal:  Negative for abdominal pain, blood in stool, constipation, diarrhea, nausea and vomiting.   Musculoskeletal:  Positive for arthralgias (R foot). Negative for myalgias.   Neurological:  Negative for dizziness, weakness, numbness and headaches.   Psychiatric/Behavioral:  Negative for dysphoric mood and sleep disturbance. The patient is not nervous/anxious.          Objective:     Vitals:    12/20/24 1004 12/20/24 1011   BP: (!) 131/94 120/81   Pulse: (!) 111 61   Resp: 16    Temp: 97.7 °F (36.5 °C)    TempSrc: Temporal    SpO2: 97%    Weight: 94.7 kg (208 lb 12.8 oz)    Height: 1.71 m (5' 7.32\")        Body mass index is 32.39 kg/m².    Physical Exam  Constitutional:       General: She is not in acute distress.  Cardiovascular:      Rate

## 2024-12-23 NOTE — RESULT ENCOUNTER NOTE
Results released to patient via [employer].  All labs are stable or at goal for her. Na just barely low but is stable so will monitor. A1c, Glucose and LFT's improved w/ weight loss. No contra-indications to planned surgery.

## 2025-01-22 ENCOUNTER — PATIENT MESSAGE (OUTPATIENT)
Age: 65
End: 2025-01-22

## 2025-01-23 ENCOUNTER — TELEPHONE (OUTPATIENT)
Age: 65
End: 2025-01-23

## 2025-01-28 DIAGNOSIS — M15.9 GENERALIZED OSTEOARTHRITIS OF MULTIPLE SITES: ICD-10-CM

## 2025-01-30 RX ORDER — PAROXETINE 40 MG/1
40 TABLET, FILM COATED ORAL EVERY MORNING
Qty: 90 TABLET | Refills: 3 | Status: SHIPPED | OUTPATIENT
Start: 2025-01-30

## 2025-01-30 RX ORDER — LISINOPRIL 20 MG/1
20 TABLET ORAL EVERY MORNING
Qty: 90 TABLET | Refills: 3 | Status: SHIPPED | OUTPATIENT
Start: 2025-01-30

## 2025-01-30 RX ORDER — CELECOXIB 200 MG/1
200 CAPSULE ORAL DAILY
Qty: 180 CAPSULE | Refills: 1 | Status: SHIPPED | OUTPATIENT
Start: 2025-01-30

## 2025-02-16 DIAGNOSIS — M85.89 OSTEOPENIA OF MULTIPLE SITES: ICD-10-CM

## 2025-02-17 RX ORDER — ALENDRONATE SODIUM 35 MG/1
TABLET ORAL
Qty: 12 TABLET | Refills: 0 | OUTPATIENT
Start: 2025-02-17

## 2025-02-17 NOTE — TELEPHONE ENCOUNTER
Please call patient.  Has been on medication x 5 yrs. Based on last DEXA okay to stop medication.  She has f/u next month and will review at that time.

## 2025-02-20 DIAGNOSIS — E11.9 CONTROLLED TYPE 2 DIABETES MELLITUS WITHOUT COMPLICATION, WITHOUT LONG-TERM CURRENT USE OF INSULIN (HCC): ICD-10-CM

## 2025-02-20 DIAGNOSIS — E66.01 OBESITY, MORBID: ICD-10-CM

## 2025-02-23 RX ORDER — SEMAGLUTIDE 1.34 MG/ML
INJECTION, SOLUTION SUBCUTANEOUS
Qty: 3 ML | Refills: 1 | Status: SHIPPED | OUTPATIENT
Start: 2025-02-23

## 2025-03-14 ENCOUNTER — OFFICE VISIT (OUTPATIENT)
Age: 65
End: 2025-03-14
Payer: MEDICARE

## 2025-03-14 VITALS
OXYGEN SATURATION: 98 % | HEART RATE: 78 BPM | DIASTOLIC BLOOD PRESSURE: 78 MMHG | SYSTOLIC BLOOD PRESSURE: 111 MMHG | TEMPERATURE: 97.5 F | WEIGHT: 203 LBS | HEIGHT: 67 IN | RESPIRATION RATE: 16 BRPM | BODY MASS INDEX: 31.86 KG/M2

## 2025-03-14 DIAGNOSIS — N93.9 VAGINAL SPOTTING: ICD-10-CM

## 2025-03-14 DIAGNOSIS — R77.9 ELEVATED BLOOD PROTEIN: ICD-10-CM

## 2025-03-14 DIAGNOSIS — E11.9 CONTROLLED TYPE 2 DIABETES MELLITUS WITHOUT COMPLICATION, WITHOUT LONG-TERM CURRENT USE OF INSULIN (HCC): ICD-10-CM

## 2025-03-14 DIAGNOSIS — E66.811 OBESITY (BMI 30.0-34.9): Primary | ICD-10-CM

## 2025-03-14 LAB
ALBUMIN SERPL-MCNC: 3.6 G/DL (ref 3.5–5)
ALBUMIN/GLOB SERPL: 0.8 (ref 1.1–2.2)
ALP SERPL-CCNC: 153 U/L (ref 45–117)
ALT SERPL-CCNC: 54 U/L (ref 12–78)
AST SERPL-CCNC: 41 U/L (ref 15–37)
BILIRUB DIRECT SERPL-MCNC: 0.2 MG/DL (ref 0–0.2)
BILIRUB SERPL-MCNC: 0.5 MG/DL (ref 0.2–1)
GLOBULIN SER CALC-MCNC: 4.8 G/DL (ref 2–4)
PROT SERPL-MCNC: 8.4 G/DL (ref 6.4–8.2)

## 2025-03-14 PROCEDURE — 1036F TOBACCO NON-USER: CPT | Performed by: INTERNAL MEDICINE

## 2025-03-14 PROCEDURE — 1160F RVW MEDS BY RX/DR IN RCRD: CPT | Performed by: INTERNAL MEDICINE

## 2025-03-14 PROCEDURE — 1126F AMNT PAIN NOTED NONE PRSNT: CPT | Performed by: INTERNAL MEDICINE

## 2025-03-14 PROCEDURE — 3074F SYST BP LT 130 MM HG: CPT | Performed by: INTERNAL MEDICINE

## 2025-03-14 PROCEDURE — 3017F COLORECTAL CA SCREEN DOC REV: CPT | Performed by: INTERNAL MEDICINE

## 2025-03-14 PROCEDURE — G8399 PT W/DXA RESULTS DOCUMENT: HCPCS | Performed by: INTERNAL MEDICINE

## 2025-03-14 PROCEDURE — 2022F DILAT RTA XM EVC RTNOPTHY: CPT | Performed by: INTERNAL MEDICINE

## 2025-03-14 PROCEDURE — G8417 CALC BMI ABV UP PARAM F/U: HCPCS | Performed by: INTERNAL MEDICINE

## 2025-03-14 PROCEDURE — 1090F PRES/ABSN URINE INCON ASSESS: CPT | Performed by: INTERNAL MEDICINE

## 2025-03-14 PROCEDURE — 1123F ACP DISCUSS/DSCN MKR DOCD: CPT | Performed by: INTERNAL MEDICINE

## 2025-03-14 PROCEDURE — 1159F MED LIST DOCD IN RCRD: CPT | Performed by: INTERNAL MEDICINE

## 2025-03-14 PROCEDURE — G8427 DOCREV CUR MEDS BY ELIG CLIN: HCPCS | Performed by: INTERNAL MEDICINE

## 2025-03-14 PROCEDURE — 3046F HEMOGLOBIN A1C LEVEL >9.0%: CPT | Performed by: INTERNAL MEDICINE

## 2025-03-14 PROCEDURE — 3078F DIAST BP <80 MM HG: CPT | Performed by: INTERNAL MEDICINE

## 2025-03-14 PROCEDURE — 99214 OFFICE O/P EST MOD 30 MIN: CPT | Performed by: INTERNAL MEDICINE

## 2025-03-14 SDOH — ECONOMIC STABILITY: FOOD INSECURITY: WITHIN THE PAST 12 MONTHS, YOU WORRIED THAT YOUR FOOD WOULD RUN OUT BEFORE YOU GOT MONEY TO BUY MORE.: NEVER TRUE

## 2025-03-14 SDOH — ECONOMIC STABILITY: FOOD INSECURITY: WITHIN THE PAST 12 MONTHS, THE FOOD YOU BOUGHT JUST DIDN'T LAST AND YOU DIDN'T HAVE MONEY TO GET MORE.: NEVER TRUE

## 2025-03-14 ASSESSMENT — ENCOUNTER SYMPTOMS
DIARRHEA: 0
COUGH: 0
CONSTIPATION: 0
ABDOMINAL PAIN: 0
VOMITING: 0
SHORTNESS OF BREATH: 0
BLOOD IN STOOL: 0
NAUSEA: 0

## 2025-03-14 ASSESSMENT — PATIENT HEALTH QUESTIONNAIRE - PHQ9
SUM OF ALL RESPONSES TO PHQ QUESTIONS 1-9: 0
1. LITTLE INTEREST OR PLEASURE IN DOING THINGS: NOT AT ALL
SUM OF ALL RESPONSES TO PHQ QUESTIONS 1-9: 0
SUM OF ALL RESPONSES TO PHQ QUESTIONS 1-9: 0
2. FEELING DOWN, DEPRESSED OR HOPELESS: NOT AT ALL
SUM OF ALL RESPONSES TO PHQ QUESTIONS 1-9: 0

## 2025-03-14 NOTE — PROGRESS NOTES
Alissa Bearden is a 64 y.o. female who was seen in clinic today (3/14/2025).    Assessment & Plan:   Below is the assessment and plan developed based on review of pertinent history, physical exam, labs, studies, and medications.  Assessment & Plan  1. Obesity.  Chronic issue, it is improving, congratulated, but still needs improvement. Weight loss is starting to plateau. She is down 34 lbs since starting medication. Will increase dose from 1g to 2g. Continue to work on diet. Needs to increase exercise as tolerated, consider bike if limited by foot pain. Concerns about long term medication reviewed.    2. Diabetes Mellitus.  Well-controlled, both A1c and fasting glucose normal on last check. No indication to repeat labs. Continue current medications, but dose increased to help with weight.    3. Elevated Protein Levels.  Chronic issue, present on the last few labs, in conjunction with elevated globulin and alk phos. Differential reviewed and will repeat labs with SPEP. No red flags at this time.    4. Vaginal spotting  New diagnosis to me, present intermittently for the last 3-4 months, differential reviewed. She is asking if related to Ozempic but unlikely. Does not have an OBGYN. Ultrasound ordered and depending on results reviewed seeing gyn/onc surgeon.     Diagnoses/Orders:   1. Obesity (BMI 30.0-34.9)  -     semaglutide, 2 MG/DOSE, (OZEMPIC) 8 MG/3ML SOPN sc injection; Inject 2 mg into the skin every 7 days, Disp-3 mL, R-5Normal  2. Controlled type 2 diabetes mellitus without complication, without long-term current use of insulin (HCC)  -     semaglutide, 2 MG/DOSE, (OZEMPIC) 8 MG/3ML SOPN sc injection; Inject 2 mg into the skin every 7 days, Disp-3 mL, R-5Normal  3. Elevated blood protein  -     Hepatic Function Panel; Future  -     Electrophoresis Protein, Serum; Future  4. Vaginal spotting  -     US PELVIS COMPLETE NON-OB TRANSABDOMINAL AND TRANSVAGINAL; Future     Return in about 6 months (around 9/14/2025)

## 2025-03-18 LAB
ALBUMIN SERPL ELPH-MCNC: 3.5 G/DL (ref 2.9–4.4)
ALBUMIN/GLOB SERPL: 0.8 (ref 0.7–1.7)
ALPHA1 GLOB SERPL ELPH-MCNC: 0.3 G/DL (ref 0–0.4)
ALPHA2 GLOB SERPL ELPH-MCNC: 0.9 G/DL (ref 0.4–1)
B-GLOBULIN SERPL ELPH-MCNC: 1.5 G/DL (ref 0.7–1.3)
GAMMA GLOB SERPL ELPH-MCNC: 1.9 G/DL (ref 0.4–1.8)
GLOBULIN SER CALC-MCNC: 4.6 G/DL (ref 2.2–3.9)
M PROTEIN SERPL ELPH-MCNC: ABNORMAL G/DL
PROT SERPL-MCNC: 8.1 G/DL (ref 6–8.5)

## 2025-03-19 ENCOUNTER — RESULTS FOLLOW-UP (OUTPATIENT)
Age: 65
End: 2025-03-19

## 2025-03-19 NOTE — RESULT ENCOUNTER NOTE
Results released to patient via [employer].  SPEP with elevated globulin (beta and gamma), no M-spike. Further review of the chart reviewed elevated globulin level back in '21. Since stable and no obvious cause (infection, auto-immune) will monitor.

## 2025-03-21 ENCOUNTER — TELEPHONE (OUTPATIENT)
Age: 65
End: 2025-03-21

## 2025-03-21 DIAGNOSIS — N93.9 VAGINAL SPOTTING: Primary | ICD-10-CM

## 2025-03-21 NOTE — TELEPHONE ENCOUNTER
Ese from [facility] called regarding the order for US Plevis complete non-ob transabdominal and transvaginal.   She said that there needs to be two separate orders for this.    Ese - [phone]

## 2025-03-25 ENCOUNTER — HOSPITAL ENCOUNTER (OUTPATIENT)
Facility: HOSPITAL | Age: 65
Discharge: HOME | End: 2025-03-28
Payer: MEDICARE

## 2025-03-25 DIAGNOSIS — N93.9 VAGINAL SPOTTING: ICD-10-CM

## 2025-03-25 PROCEDURE — 76830 TRANSVAGINAL US NON-OB: CPT

## 2025-03-26 ENCOUNTER — RESULTS FOLLOW-UP (OUTPATIENT)
Age: 65
End: 2025-03-26

## 2025-03-27 DIAGNOSIS — N93.9 VAGINAL SPOTTING: Primary | ICD-10-CM

## 2025-04-07 NOTE — PROGRESS NOTES
New Patient, Referred by Dr. Carmona for PMB, fibroids, pt states she has lower back pain, comes and gores, 2/10 on pain scale    1. Have you been to the ER, urgent care clinic since your last visit?  Hospitalized since your last visit?  no    2. Have you seen or consulted any other health care providers outside of the [facility] since your last visit?  Include any pap smears or colon screening.   no

## 2025-04-09 ENCOUNTER — OFFICE VISIT (OUTPATIENT)
Age: 65
End: 2025-04-09
Payer: MEDICARE

## 2025-04-09 VITALS
HEART RATE: 89 BPM | BODY MASS INDEX: 31.83 KG/M2 | HEIGHT: 67 IN | SYSTOLIC BLOOD PRESSURE: 105 MMHG | WEIGHT: 202.8 LBS | DIASTOLIC BLOOD PRESSURE: 73 MMHG

## 2025-04-09 DIAGNOSIS — N95.0 PMB (POSTMENOPAUSAL BLEEDING): Primary | ICD-10-CM

## 2025-04-09 PROCEDURE — G8417 CALC BMI ABV UP PARAM F/U: HCPCS | Performed by: OBSTETRICS & GYNECOLOGY

## 2025-04-09 PROCEDURE — 1160F RVW MEDS BY RX/DR IN RCRD: CPT | Performed by: OBSTETRICS & GYNECOLOGY

## 2025-04-09 PROCEDURE — 1125F AMNT PAIN NOTED PAIN PRSNT: CPT | Performed by: OBSTETRICS & GYNECOLOGY

## 2025-04-09 PROCEDURE — 1090F PRES/ABSN URINE INCON ASSESS: CPT | Performed by: OBSTETRICS & GYNECOLOGY

## 2025-04-09 PROCEDURE — 3078F DIAST BP <80 MM HG: CPT | Performed by: OBSTETRICS & GYNECOLOGY

## 2025-04-09 PROCEDURE — 3074F SYST BP LT 130 MM HG: CPT | Performed by: OBSTETRICS & GYNECOLOGY

## 2025-04-09 PROCEDURE — 1159F MED LIST DOCD IN RCRD: CPT | Performed by: OBSTETRICS & GYNECOLOGY

## 2025-04-09 PROCEDURE — 99205 OFFICE O/P NEW HI 60 MIN: CPT | Performed by: OBSTETRICS & GYNECOLOGY

## 2025-04-09 PROCEDURE — 1123F ACP DISCUSS/DSCN MKR DOCD: CPT | Performed by: OBSTETRICS & GYNECOLOGY

## 2025-04-09 PROCEDURE — G8399 PT W/DXA RESULTS DOCUMENT: HCPCS | Performed by: OBSTETRICS & GYNECOLOGY

## 2025-04-09 PROCEDURE — 1036F TOBACCO NON-USER: CPT | Performed by: OBSTETRICS & GYNECOLOGY

## 2025-04-09 PROCEDURE — G8427 DOCREV CUR MEDS BY ELIG CLIN: HCPCS | Performed by: OBSTETRICS & GYNECOLOGY

## 2025-04-09 PROCEDURE — 3017F COLORECTAL CA SCREEN DOC REV: CPT | Performed by: OBSTETRICS & GYNECOLOGY

## 2025-04-09 ASSESSMENT — PATIENT HEALTH QUESTIONNAIRE - PHQ9
2. FEELING DOWN, DEPRESSED OR HOPELESS: NOT AT ALL
5. POOR APPETITE OR OVEREATING: NOT AT ALL
6. FEELING BAD ABOUT YOURSELF - OR THAT YOU ARE A FAILURE OR HAVE LET YOURSELF OR YOUR FAMILY DOWN: NOT AT ALL
7. TROUBLE CONCENTRATING ON THINGS, SUCH AS READING THE NEWSPAPER OR WATCHING TELEVISION: NOT AT ALL
SUM OF ALL RESPONSES TO PHQ QUESTIONS 1-9: 0
8. MOVING OR SPEAKING SO SLOWLY THAT OTHER PEOPLE COULD HAVE NOTICED. OR THE OPPOSITE, BEING SO FIGETY OR RESTLESS THAT YOU HAVE BEEN MOVING AROUND A LOT MORE THAN USUAL: NOT AT ALL
4. FEELING TIRED OR HAVING LITTLE ENERGY: NOT AT ALL
SUM OF ALL RESPONSES TO PHQ QUESTIONS 1-9: 0
9. THOUGHTS THAT YOU WOULD BE BETTER OFF DEAD, OR OF HURTING YOURSELF: NOT AT ALL
3. TROUBLE FALLING OR STAYING ASLEEP: NOT AT ALL
1. LITTLE INTEREST OR PLEASURE IN DOING THINGS: NOT AT ALL

## 2025-04-09 NOTE — PROGRESS NOTES
[facility]  [address] [place] [address], [address]  [place] [address]  P [phone]  F [phone]    Office Note  Patient ID:  Name:  Alissa Bearden  MRN:  [id number]  :  1960/64 y.o.  Date:  2025      HISTORY OF PRESENT ILLNESS:  Ms. Alissa Bearden is a 64 y.o. female who presents as a new patient from Dr. Carmona for postmenopausal bleeding and fibroids.     Patient was seen by Dr. Lexa Carmona, internal medicine.  Patient reported some vaginal spotting.  Pelvic ultrasound 3/25/2025 demonstrated small fibroids, with one of them being mucosal.  Endometrial stripe of 2 mm.    Imaging Review:   Pelvic ultrasound 3/25/2025:  FINDINGS:  Uterus: 4.0 x 1.7 x 3.8 cm.  The endometrial stripe measures 2 mm.  Uterine echotexture is heterogeneous. There is a 0.7 x 0.7 x 0.6 cm intramural  fibroid in the anterior uterine fundus. There is a 1.3 x 1.3 x 1.0 cm submucosal  or pedunculated fibroid arising posteriorly from the uterus.  Right ovary:   Not visualized because of bowel gas.  Left ovary: 1.6 x 1.5 x 1.3 cm.  Normal in echotexture.  Color Doppler and spectral evaluation reveals blood flow in both ovaries.  IMPRESSION:  1.  Uterine fibroids.  2.  The right ovary is not visualized.    ROS:  A comprehensive review of systems was negative except for that written in the History of Present Illness. , 10 point ROS      ECOG stGstrstastdstest:st st1st Problem List:  Patient Active Problem List    Diagnosis Date Noted    Calculus of gallbladder without cholecystitis without obstruction 2023    Pure hypercholesterolemia 10/03/2022    Gastroesophageal reflux disease without esophagitis 2022    S/P TKR (total knee replacement), left 01/15/2020    Fatty liver 2019    Obesity (BMI 30.0-34.9) 2018    Generalized osteoarthritis of multiple sites     Osteopenia 2016    Controlled type 2 diabetes mellitus without complication, without long-term current use of insulin 2016    History of

## 2025-04-09 NOTE — H&P (VIEW-ONLY)
Formerly [facility], [facility] GYNECOLOGIC ONCOLOGY  [address] [place] [address], [address]  [place] [address]  P [phone]  F [phone]    Office Note  Patient ID:  Name:  Alissa Bearden  MRN:  [id number]  :  1960/64 y.o.  Date:  2025      HISTORY OF PRESENT ILLNESS:  Ms. Alissa Bearden is a 64 y.o. female who presents as a new patient from Dr. Carmona for postmenopausal bleeding and fibroids.     Patient was seen by Dr. Lexa Carmona, internal medicine.  Patient reported some vaginal spotting.  Pelvic ultrasound 3/25/2025 demonstrated small fibroids, with one of them being mucosal.  Endometrial stripe of 2 mm.    Imaging Review:   Pelvic ultrasound 3/25/2025:  FINDINGS:  Uterus: 4.0 x 1.7 x 3.8 cm.  The endometrial stripe measures 2 mm.  Uterine echotexture is heterogeneous. There is a 0.7 x 0.7 x 0.6 cm intramural  fibroid in the anterior uterine fundus. There is a 1.3 x 1.3 x 1.0 cm submucosal  or pedunculated fibroid arising posteriorly from the uterus.  Right ovary:   Not visualized because of bowel gas.  Left ovary: 1.6 x 1.5 x 1.3 cm.  Normal in echotexture.  Color Doppler and spectral evaluation reveals blood flow in both ovaries.  IMPRESSION:  1.  Uterine fibroids.  2.  The right ovary is not visualized.    ROS:  A comprehensive review of systems was negative except for that written in the History of Present Illness. , 10 point ROS      ECOG stGstrstastdstest:st st1st Problem List:  Patient Active Problem List    Diagnosis Date Noted    Calculus of gallbladder without cholecystitis without obstruction 2023    Pure hypercholesterolemia 10/03/2022    Gastroesophageal reflux disease without esophagitis 2022    S/P TKR (total knee replacement), left 01/15/2020    Fatty liver 2019    Obesity (BMI 30.0-34.9) 2018    Generalized osteoarthritis of multiple sites     Osteopenia 2016    Controlled type 2 diabetes mellitus without complication, without long-term current use of insulin 2016    History of  MORNING    celecoxib (CELEBREX) 200 MG capsule Take 1 capsule by mouth once daily    omeprazole (PRILOSEC) 20 MG delayed release capsule Take 1 capsule by mouth daily    Multiple Vitamin (MULTIVITAMIN PO) Take by mouth daily    acetaminophen (TYLENOL) 500 MG CAPS capsule Take 1 capsule by mouth every 4 hours as needed    ALPRAZolam (XANAX) 0.5 MG tablet Take 1 tablet by mouth. (Patient not taking: Reported on 4/9/2025)     No current facility-administered medications for this visit.     Allergies: (reviewed)  No Known Allergies         OBJECTIVE:    Physical Exam:  VITAL SIGNS: Vitals:    04/09/25 0904   BP: 105/73   BP Site: Left Upper Arm   Patient Position: Sitting   Pulse: 89   Weight: 92 kg (202 lb 12.8 oz)   Height: 1.702 m (5' 7\")     Body mass index is 31.76 kg/m².   GENERAL CA: Conversant, alert, oriented. No acute distress.   HEENT: HEENT. No thyroid enlargement. No JVD.   Neck: Supple without restrictions.   RESPIRATORY: Clear to auscultation and percussion to the bases. No CVAT.   CARDIOVASC: RRR without murmur/rub.   GASTROINT: soft, non-tender, without masses or organomegaly   MUSCULOSKEL: no joint tenderness, deformity or swelling       EXTREMITIES: extremities normal, atraumatic, no cyanosis or edema   PELVIC: Exam chaperoned by nurse. Normal appearing external genitalia. On speculum exam, normal appearing vagina and cervix. On bimanual exam, the cervix and uterus are normal size and mobile. No evidence of adnexal masses or nodularity.    RECTAL: deferred   SHIRA SURVEY: No suspicious lymphadenopathy or edema noted.   NEURO: Grossly intact. No acute deficit.       Lab Date as available:    Lab Results   Component Value Date/Time    WBC 7.1 12/20/2024 12:19 PM    HGB 13.6 12/20/2024 12:19 PM    HCT 42.7 12/20/2024 12:19 PM     12/20/2024 12:19 PM    MCV 89.9 12/20/2024 12:19 PM     Lab Results   Component Value Date/Time     12/20/2024 12:19 PM    K 4.2 12/20/2024 12:19 PM

## 2025-04-10 ENCOUNTER — HOSPITAL ENCOUNTER (OUTPATIENT)
Facility: HOSPITAL | Age: 65
Setting detail: SPECIMEN
Discharge: HOME | End: 2025-04-13
Payer: MEDICARE

## 2025-04-10 PROCEDURE — 88175 CYTOPATH C/V AUTO FLUID REDO: CPT

## 2025-04-10 PROCEDURE — 87624 HPV HI-RISK TYP POOLED RSLT: CPT

## 2025-04-11 RX ORDER — ALPRAZOLAM 0.5 MG
0.5 TABLET ORAL
OUTPATIENT
Start: 2025-04-11

## 2025-04-11 NOTE — TELEPHONE ENCOUNTER
Chart reviewed, medication not written for her since 2020.  No active Rx on file, but listed as Pt Entered. Need more information.  MEDSEEK message sent.    Last filled on: n/a  Last visit: 3/14/2025    PDMP reviewed on 4/11/2025  3:54 PM.    Future Appointments   Date Time Provider Department Center   4/15/2025  3:00 PM [facility] PAT EXAM RM 4 [facility]

## 2025-04-14 DIAGNOSIS — F41.8 SITUATIONAL ANXIETY: Primary | ICD-10-CM

## 2025-04-14 LAB — HPV I/H RISK 1 DNA CVX QL PROBE+SIG AMP: NEGATIVE

## 2025-04-14 RX ORDER — ALPRAZOLAM 0.5 MG
TABLET ORAL
Qty: 2 TABLET | Refills: 0 | Status: SHIPPED | OUTPATIENT
Start: 2025-04-14 | End: 2025-04-24

## 2025-04-14 NOTE — TELEPHONE ENCOUNTER
Chart reviewed  Last filled on: ???.  Only med on  was a pain med from Jan '25.  Last visit: 3/14/2025    PDMP reviewed on 4/14/2025  4:37 PM.    Future Appointments   Date Time Provider Department Center   4/15/2025  3:00 PM [facility] PAT EXAM RM 4 [facility]

## 2025-04-15 ENCOUNTER — HOSPITAL ENCOUNTER (OUTPATIENT)
Facility: HOSPITAL | Age: 65
Discharge: HOME | End: 2025-04-18
Payer: MEDICARE

## 2025-04-15 ENCOUNTER — HOSPITAL ENCOUNTER (OUTPATIENT)
Age: 65
Discharge: HOME | End: 2025-04-18
Payer: MEDICARE

## 2025-04-15 VITALS
HEART RATE: 97 BPM | SYSTOLIC BLOOD PRESSURE: 114 MMHG | BODY MASS INDEX: 31.89 KG/M2 | WEIGHT: 203.2 LBS | RESPIRATION RATE: 20 BRPM | HEIGHT: 67 IN | DIASTOLIC BLOOD PRESSURE: 79 MMHG | TEMPERATURE: 97.8 F

## 2025-04-15 LAB
ALBUMIN SERPL-MCNC: 3.4 G/DL (ref 3.5–5)
ALBUMIN/GLOB SERPL: 0.7 (ref 1.1–2.2)
ALP SERPL-CCNC: 178 U/L (ref 45–117)
ALT SERPL-CCNC: 56 U/L (ref 12–78)
ANION GAP SERPL CALC-SCNC: 3 MMOL/L (ref 2–12)
AST SERPL-CCNC: 45 U/L (ref 15–37)
BASOPHILS # BLD: 0.09 K/UL (ref 0–0.1)
BASOPHILS NFR BLD: 1.4 % (ref 0–1)
BILIRUB SERPL-MCNC: 0.5 MG/DL (ref 0.2–1)
BUN SERPL-MCNC: 11 MG/DL (ref 6–20)
BUN/CREAT SERPL: 12 (ref 12–20)
CALCIUM SERPL-MCNC: 9.7 MG/DL (ref 8.5–10.1)
CHLORIDE SERPL-SCNC: 103 MMOL/L (ref 97–108)
CO2 SERPL-SCNC: 28 MMOL/L (ref 21–32)
CREAT SERPL-MCNC: 0.92 MG/DL (ref 0.55–1.02)
DIFFERENTIAL METHOD BLD: ABNORMAL
EKG ATRIAL RATE: 69 BPM
EKG DIAGNOSIS: NORMAL
EKG P AXIS: 18 DEGREES
EKG P-R INTERVAL: 160 MS
EKG Q-T INTERVAL: 378 MS
EKG QRS DURATION: 82 MS
EKG QTC CALCULATION (BAZETT): 405 MS
EKG R AXIS: 7 DEGREES
EKG T AXIS: 41 DEGREES
EKG VENTRICULAR RATE: 69 BPM
EOSINOPHIL # BLD: 0.52 K/UL (ref 0–0.4)
EOSINOPHIL NFR BLD: 8.2 % (ref 0–7)
ERYTHROCYTE [DISTWIDTH] IN BLOOD BY AUTOMATED COUNT: 13.5 % (ref 11.5–14.5)
EST. AVERAGE GLUCOSE BLD GHB EST-MCNC: 111 MG/DL
GLOBULIN SER CALC-MCNC: 4.6 G/DL (ref 2–4)
GLUCOSE SERPL-MCNC: 90 MG/DL (ref 65–100)
HBA1C MFR BLD: 5.5 % (ref 4–5.6)
HCT VFR BLD AUTO: 41.1 % (ref 35–47)
HGB BLD-MCNC: 13.1 G/DL (ref 11.5–16)
IMM GRANULOCYTES # BLD AUTO: 0.01 K/UL (ref 0–0.04)
IMM GRANULOCYTES NFR BLD AUTO: 0.2 % (ref 0–0.5)
LYMPHOCYTES # BLD: 2.13 K/UL (ref 0.8–3.5)
LYMPHOCYTES NFR BLD: 33.6 % (ref 12–49)
MCH RBC QN AUTO: 28.4 PG (ref 26–34)
MCHC RBC AUTO-ENTMCNC: 31.9 G/DL (ref 30–36.5)
MCV RBC AUTO: 89.2 FL (ref 80–99)
MONOCYTES # BLD: 0.66 K/UL (ref 0–1)
MONOCYTES NFR BLD: 10.4 % (ref 5–13)
NEUTS SEG # BLD: 2.92 K/UL (ref 1.8–8)
NEUTS SEG NFR BLD: 46.2 % (ref 32–75)
NRBC # BLD: 0 K/UL (ref 0–0.01)
NRBC BLD-RTO: 0 PER 100 WBC
PLATELET # BLD AUTO: 392 K/UL (ref 150–400)
PMV BLD AUTO: 9.7 FL (ref 8.9–12.9)
POTASSIUM SERPL-SCNC: 4.1 MMOL/L (ref 3.5–5.1)
PROT SERPL-MCNC: 8 G/DL (ref 6.4–8.2)
RBC # BLD AUTO: 4.61 M/UL (ref 3.8–5.2)
SODIUM SERPL-SCNC: 134 MMOL/L (ref 136–145)
WBC # BLD AUTO: 6.3 K/UL (ref 3.6–11)

## 2025-04-15 PROCEDURE — 83036 HEMOGLOBIN GLYCOSYLATED A1C: CPT

## 2025-04-15 PROCEDURE — 71046 X-RAY EXAM CHEST 2 VIEWS: CPT

## 2025-04-15 PROCEDURE — 85025 COMPLETE CBC W/AUTO DIFF WBC: CPT

## 2025-04-15 PROCEDURE — 93005 ELECTROCARDIOGRAM TRACING: CPT | Performed by: OBSTETRICS & GYNECOLOGY

## 2025-04-15 PROCEDURE — 93010 ELECTROCARDIOGRAM REPORT: CPT | Performed by: INTERNAL MEDICINE

## 2025-04-15 PROCEDURE — 80053 COMPREHEN METABOLIC PANEL: CPT

## 2025-04-15 NOTE — PERIOP NOTE
INSTRUCTIONS GIVEN AND REVIEWED, 2 BOTTLE OF SOAP GIVEN PT GIVEN TIME TO ASK QUESTIONS     EKG PERFORMED     PATIENT GIVEN WRITTEN INSTRUCTIONS TO GO TO THE IMAGING CENTER/CANCER CENTER [address] [facility] [address] TO HAVE CHEST XRAY COMPLETED.

## 2025-04-15 NOTE — PERIOP NOTE
[address] [address]      MAIN PRE OP             [phone]                                                                                AMBULATORY PRE OP          [phone]    PRE-ADMISSION TESTING    [phone]     Surgery Date:  04/24/25        [facility] staff will call you between 4 and 7pm the day before your surgery with your arrival time. (If your surgery is on a Monday, we will call you the Friday before.)    Call [phone] after 7pm Monday-Friday if you did not receive this call.    INSTRUCTIONS BEFORE YOUR SURGERY   When You  Arrive Arrive at [facility] Patient Access on 1st floor the day of your surgery.  Have your insurance card, photo ID,living will/advanced directive/POA (if applicable),  and any copayment (if needed)   Food   and   Drink NO solid food after midnight the night before surgery. You can drink clear liquids from midnight until ONE hour prior to your arrival at the hospital on the day of your surgery. Clear liquids include:  Water  Apple juice (no sediment)  Carbonated beverages  Black coffee(no cream/milk)  Tea(no cream/milk)  Gatorade    No alcohol (beer, wine, liquor) or marijuana (smoking) 24 hours prior to surgery.   No edibles for 3 days prior to surgery.    Stop smoking cigarettes 14 days before surgery (helps w/healing and breathing).     Medications to   TAKE   Morning of Surgery MEDICATIONS TO TAKE THE MORNING OF SURGERY: PAXIL,  OMEPRAZOLE    You may take these medications, IF NEEDED, the morning of surgery: TYLENOL: LAST DOSE IS TO BE 4 HOURS PRIOR TO ARRIVAL TIME, XANAX,      Ask your surgeon/prescribing doctor for instructions on taking or stopping these medications prior to surgery: CELEBREX     Medications to STOP  before surgery Non-Steroidal anti-inflammatory Drugs (NSAID's): for example, Diclofenac (Voltaren), Ibuprofen (Advil, Motrin), Naproxen (Aleve) 3 days STOP TAKING ON

## 2025-04-16 ENCOUNTER — RESULTS FOLLOW-UP (OUTPATIENT)
Age: 65
End: 2025-04-16

## 2025-04-23 ENCOUNTER — ANESTHESIA EVENT (OUTPATIENT)
Facility: HOSPITAL | Age: 65
End: 2025-04-23
Payer: MEDICARE

## 2025-04-24 ENCOUNTER — HOSPITAL ENCOUNTER (OUTPATIENT)
Facility: HOSPITAL | Age: 65
Setting detail: OUTPATIENT SURGERY
Discharge: HOME | End: 2025-04-24
Attending: OBSTETRICS & GYNECOLOGY | Admitting: OBSTETRICS & GYNECOLOGY
Payer: MEDICARE

## 2025-04-24 ENCOUNTER — ANCILLARY PROCEDURE (OUTPATIENT)
Facility: HOSPITAL | Age: 65
End: 2025-04-24
Attending: OBSTETRICS & GYNECOLOGY
Payer: MEDICARE

## 2025-04-24 ENCOUNTER — ANESTHESIA (OUTPATIENT)
Facility: HOSPITAL | Age: 65
End: 2025-04-24
Payer: MEDICARE

## 2025-04-24 VITALS
SYSTOLIC BLOOD PRESSURE: 122 MMHG | TEMPERATURE: 98.3 F | WEIGHT: 203 LBS | BODY MASS INDEX: 31.86 KG/M2 | HEART RATE: 77 BPM | DIASTOLIC BLOOD PRESSURE: 80 MMHG | RESPIRATION RATE: 14 BRPM | HEIGHT: 67 IN | OXYGEN SATURATION: 95 %

## 2025-04-24 DIAGNOSIS — Z01.818 PRE-OP TESTING: Primary | ICD-10-CM

## 2025-04-24 LAB
GLUCOSE BLD STRIP.AUTO-MCNC: 79 MG/DL (ref 65–117)
GLUCOSE BLD STRIP.AUTO-MCNC: 81 MG/DL (ref 65–117)
SERVICE CMNT-IMP: NORMAL
SERVICE CMNT-IMP: NORMAL

## 2025-04-24 PROCEDURE — 6370000000 HC RX 637 (ALT 250 FOR IP): Performed by: PHYSICIAN ASSISTANT

## 2025-04-24 PROCEDURE — 2500000003 HC RX 250 WO HCPCS

## 2025-04-24 PROCEDURE — 2580000003 HC RX 258

## 2025-04-24 PROCEDURE — 6360000002 HC RX W HCPCS

## 2025-04-24 PROCEDURE — 3700000000 HC ANESTHESIA ATTENDED CARE: Performed by: OBSTETRICS & GYNECOLOGY

## 2025-04-24 PROCEDURE — 2500000003 HC RX 250 WO HCPCS: Performed by: PHYSICIAN ASSISTANT

## 2025-04-24 PROCEDURE — 2709999900 HC NON-CHARGEABLE SUPPLY: Performed by: OBSTETRICS & GYNECOLOGY

## 2025-04-24 PROCEDURE — 7100000001 HC PACU RECOVERY - ADDTL 15 MIN: Performed by: OBSTETRICS & GYNECOLOGY

## 2025-04-24 PROCEDURE — 6370000000 HC RX 637 (ALT 250 FOR IP): Performed by: OBSTETRICS & GYNECOLOGY

## 2025-04-24 PROCEDURE — 3700000001 HC ADD 15 MINUTES (ANESTHESIA): Performed by: OBSTETRICS & GYNECOLOGY

## 2025-04-24 PROCEDURE — 7100000000 HC PACU RECOVERY - FIRST 15 MIN: Performed by: OBSTETRICS & GYNECOLOGY

## 2025-04-24 PROCEDURE — 7100000010 HC PHASE II RECOVERY - FIRST 15 MIN: Performed by: OBSTETRICS & GYNECOLOGY

## 2025-04-24 PROCEDURE — 6360000002 HC RX W HCPCS: Performed by: PHYSICIAN ASSISTANT

## 2025-04-24 PROCEDURE — 88305 TISSUE EXAM BY PATHOLOGIST: CPT

## 2025-04-24 PROCEDURE — 3600000004 HC SURGERY LEVEL 4 BASE: Performed by: OBSTETRICS & GYNECOLOGY

## 2025-04-24 PROCEDURE — 2580000003 HC RX 258: Performed by: ANESTHESIOLOGY

## 2025-04-24 PROCEDURE — 82962 GLUCOSE BLOOD TEST: CPT

## 2025-04-24 PROCEDURE — 3600000014 HC SURGERY LEVEL 4 ADDTL 15MIN: Performed by: OBSTETRICS & GYNECOLOGY

## 2025-04-24 RX ORDER — SODIUM CHLORIDE, SODIUM LACTATE, POTASSIUM CHLORIDE, CALCIUM CHLORIDE 600; 310; 30; 20 MG/100ML; MG/100ML; MG/100ML; MG/100ML
INJECTION, SOLUTION INTRAVENOUS CONTINUOUS
Status: DISCONTINUED | OUTPATIENT
Start: 2025-04-24 | End: 2025-04-24 | Stop reason: HOSPADM

## 2025-04-24 RX ORDER — ONDANSETRON 2 MG/ML
4 INJECTION INTRAMUSCULAR; INTRAVENOUS
Status: DISCONTINUED | OUTPATIENT
Start: 2025-04-24 | End: 2025-04-24 | Stop reason: HOSPADM

## 2025-04-24 RX ORDER — FENTANYL CITRATE 50 UG/ML
25 INJECTION, SOLUTION INTRAMUSCULAR; INTRAVENOUS EVERY 5 MIN PRN
Status: DISCONTINUED | OUTPATIENT
Start: 2025-04-24 | End: 2025-04-24 | Stop reason: HOSPADM

## 2025-04-24 RX ORDER — FENTANYL CITRATE 50 UG/ML
INJECTION, SOLUTION INTRAMUSCULAR; INTRAVENOUS
Status: DISCONTINUED | OUTPATIENT
Start: 2025-04-24 | End: 2025-04-24 | Stop reason: SDUPTHER

## 2025-04-24 RX ORDER — SODIUM CHLORIDE 0.9 % (FLUSH) 0.9 %
5-40 SYRINGE (ML) INJECTION PRN
Status: DISCONTINUED | OUTPATIENT
Start: 2025-04-24 | End: 2025-04-24 | Stop reason: HOSPADM

## 2025-04-24 RX ORDER — SODIUM CHLORIDE 0.9 % (FLUSH) 0.9 %
5-40 SYRINGE (ML) INJECTION EVERY 12 HOURS SCHEDULED
Status: DISCONTINUED | OUTPATIENT
Start: 2025-04-24 | End: 2025-04-24 | Stop reason: HOSPADM

## 2025-04-24 RX ORDER — ROCURONIUM BROMIDE 10 MG/ML
INJECTION, SOLUTION INTRAVENOUS
Status: DISCONTINUED | OUTPATIENT
Start: 2025-04-24 | End: 2025-04-24 | Stop reason: SDUPTHER

## 2025-04-24 RX ORDER — OXYCODONE HYDROCHLORIDE 5 MG/1
5 TABLET ORAL
Status: DISCONTINUED | OUTPATIENT
Start: 2025-04-24 | End: 2025-04-24 | Stop reason: HOSPADM

## 2025-04-24 RX ORDER — HYDROMORPHONE HYDROCHLORIDE 1 MG/ML
0.5 INJECTION, SOLUTION INTRAMUSCULAR; INTRAVENOUS; SUBCUTANEOUS EVERY 5 MIN PRN
Status: DISCONTINUED | OUTPATIENT
Start: 2025-04-24 | End: 2025-04-24 | Stop reason: HOSPADM

## 2025-04-24 RX ORDER — LIDOCAINE HYDROCHLORIDE 20 MG/ML
INJECTION, SOLUTION EPIDURAL; INFILTRATION; INTRACAUDAL; PERINEURAL
Status: DISCONTINUED | OUTPATIENT
Start: 2025-04-24 | End: 2025-04-24 | Stop reason: SDUPTHER

## 2025-04-24 RX ORDER — SUCCINYLCHOLINE CHLORIDE 20 MG/ML
INJECTION INTRAMUSCULAR; INTRAVENOUS
Status: DISCONTINUED | OUTPATIENT
Start: 2025-04-24 | End: 2025-04-24 | Stop reason: SDUPTHER

## 2025-04-24 RX ORDER — HYDRALAZINE HYDROCHLORIDE 20 MG/ML
10 INJECTION INTRAMUSCULAR; INTRAVENOUS ONCE
Status: DISCONTINUED | OUTPATIENT
Start: 2025-04-24 | End: 2025-04-24 | Stop reason: HOSPADM

## 2025-04-24 RX ORDER — SODIUM CHLORIDE 9 MG/ML
INJECTION, SOLUTION INTRAVENOUS PRN
Status: DISCONTINUED | OUTPATIENT
Start: 2025-04-24 | End: 2025-04-24 | Stop reason: HOSPADM

## 2025-04-24 RX ORDER — ACETAMINOPHEN 500 MG
1000 TABLET ORAL ONCE
Status: DISCONTINUED | OUTPATIENT
Start: 2025-04-24 | End: 2025-04-24 | Stop reason: SDUPTHER

## 2025-04-24 RX ORDER — DEXAMETHASONE SODIUM PHOSPHATE 4 MG/ML
INJECTION, SOLUTION INTRA-ARTICULAR; INTRALESIONAL; INTRAMUSCULAR; INTRAVENOUS; SOFT TISSUE
Status: DISCONTINUED | OUTPATIENT
Start: 2025-04-24 | End: 2025-04-24 | Stop reason: SDUPTHER

## 2025-04-24 RX ORDER — PROCHLORPERAZINE EDISYLATE 5 MG/ML
5 INJECTION INTRAMUSCULAR; INTRAVENOUS
Status: DISCONTINUED | OUTPATIENT
Start: 2025-04-24 | End: 2025-04-24 | Stop reason: HOSPADM

## 2025-04-24 RX ORDER — ACETAMINOPHEN 500 MG
1000 TABLET ORAL ONCE
Status: COMPLETED | OUTPATIENT
Start: 2025-04-24 | End: 2025-04-24

## 2025-04-24 RX ORDER — PROPOFOL 10 MG/ML
INJECTION, EMULSION INTRAVENOUS
Status: DISCONTINUED | OUTPATIENT
Start: 2025-04-24 | End: 2025-04-24 | Stop reason: SDUPTHER

## 2025-04-24 RX ORDER — NALOXONE HYDROCHLORIDE 0.4 MG/ML
INJECTION, SOLUTION INTRAMUSCULAR; INTRAVENOUS; SUBCUTANEOUS PRN
Status: DISCONTINUED | OUTPATIENT
Start: 2025-04-24 | End: 2025-04-24 | Stop reason: HOSPADM

## 2025-04-24 RX ORDER — MIDAZOLAM HYDROCHLORIDE 2 MG/2ML
2 INJECTION, SOLUTION INTRAMUSCULAR; INTRAVENOUS PRN
Status: DISCONTINUED | OUTPATIENT
Start: 2025-04-24 | End: 2025-04-24 | Stop reason: HOSPADM

## 2025-04-24 RX ORDER — ONDANSETRON 2 MG/ML
INJECTION INTRAMUSCULAR; INTRAVENOUS
Status: DISCONTINUED | OUTPATIENT
Start: 2025-04-24 | End: 2025-04-24 | Stop reason: SDUPTHER

## 2025-04-24 RX ORDER — LIDOCAINE HYDROCHLORIDE 10 MG/ML
1 INJECTION, SOLUTION EPIDURAL; INFILTRATION; INTRACAUDAL; PERINEURAL
Status: DISCONTINUED | OUTPATIENT
Start: 2025-04-24 | End: 2025-04-24 | Stop reason: HOSPADM

## 2025-04-24 RX ORDER — MIDAZOLAM HYDROCHLORIDE 1 MG/ML
INJECTION, SOLUTION INTRAMUSCULAR; INTRAVENOUS
Status: DISCONTINUED | OUTPATIENT
Start: 2025-04-24 | End: 2025-04-24 | Stop reason: SDUPTHER

## 2025-04-24 RX ORDER — FENTANYL CITRATE 50 UG/ML
100 INJECTION, SOLUTION INTRAMUSCULAR; INTRAVENOUS
Status: DISCONTINUED | OUTPATIENT
Start: 2025-04-24 | End: 2025-04-24 | Stop reason: HOSPADM

## 2025-04-24 RX ORDER — KETOROLAC TROMETHAMINE 30 MG/ML
INJECTION, SOLUTION INTRAMUSCULAR; INTRAVENOUS
Status: DISCONTINUED | OUTPATIENT
Start: 2025-04-24 | End: 2025-04-24 | Stop reason: SDUPTHER

## 2025-04-24 RX ORDER — SODIUM CHLORIDE, SODIUM LACTATE, POTASSIUM CHLORIDE, CALCIUM CHLORIDE 600; 310; 30; 20 MG/100ML; MG/100ML; MG/100ML; MG/100ML
INJECTION, SOLUTION INTRAVENOUS
Status: DISCONTINUED | OUTPATIENT
Start: 2025-04-24 | End: 2025-04-24 | Stop reason: SDUPTHER

## 2025-04-24 RX ADMIN — ACETAMINOPHEN 1000 MG: 500 TABLET ORAL at 12:45

## 2025-04-24 RX ADMIN — PROPOFOL 200 MG: 10 INJECTION, EMULSION INTRAVENOUS at 14:12

## 2025-04-24 RX ADMIN — ROCURONIUM BROMIDE 40 MG: 50 INJECTION INTRAVENOUS at 14:17

## 2025-04-24 RX ADMIN — SUCCINYLCHOLINE CHLORIDE 120 MG: 20 INJECTION, SOLUTION INTRAMUSCULAR; INTRAVENOUS at 14:12

## 2025-04-24 RX ADMIN — KETOROLAC TROMETHAMINE 15 MG: 30 INJECTION, SOLUTION INTRAMUSCULAR at 14:45

## 2025-04-24 RX ADMIN — FENTANYL CITRATE 50 MCG: 50 INJECTION INTRAMUSCULAR; INTRAVENOUS at 14:45

## 2025-04-24 RX ADMIN — ONDANSETRON 4 MG: 2 INJECTION, SOLUTION INTRAMUSCULAR; INTRAVENOUS at 14:43

## 2025-04-24 RX ADMIN — LIDOCAINE HYDROCHLORIDE 100 MG: 20 INJECTION, SOLUTION EPIDURAL; INFILTRATION; INTRACAUDAL; PERINEURAL at 14:12

## 2025-04-24 RX ADMIN — FENTANYL CITRATE 50 MCG: 50 INJECTION INTRAMUSCULAR; INTRAVENOUS at 14:12

## 2025-04-24 RX ADMIN — MIDAZOLAM 2 MG: 1 INJECTION INTRAMUSCULAR; INTRAVENOUS at 14:04

## 2025-04-24 RX ADMIN — SUGAMMADEX 200 MG: 100 INJECTION, SOLUTION INTRAVENOUS at 14:43

## 2025-04-24 RX ADMIN — SODIUM CHLORIDE, POTASSIUM CHLORIDE, SODIUM LACTATE AND CALCIUM CHLORIDE: 600; 310; 30; 20 INJECTION, SOLUTION INTRAVENOUS at 14:07

## 2025-04-24 RX ADMIN — ROCURONIUM BROMIDE 5 MG: 50 INJECTION INTRAVENOUS at 14:12

## 2025-04-24 RX ADMIN — SODIUM CHLORIDE, SODIUM LACTATE, POTASSIUM CHLORIDE, AND CALCIUM CHLORIDE: .6; .31; .03; .02 INJECTION, SOLUTION INTRAVENOUS at 12:45

## 2025-04-24 RX ADMIN — DEXAMETHASONE SODIUM PHOSPHATE 4 MG: 4 INJECTION INTRA-ARTICULAR; INTRALESIONAL; INTRAMUSCULAR; INTRAVENOUS; SOFT TISSUE at 14:16

## 2025-04-24 RX ADMIN — PHENYLEPHRINE HYDROCHLORIDE 40 MCG/MIN: 10 INJECTION INTRAVENOUS at 14:27

## 2025-04-24 RX ADMIN — WATER 2000 MG: 1 INJECTION INTRAMUSCULAR; INTRAVENOUS; SUBCUTANEOUS at 14:22

## 2025-04-24 ASSESSMENT — PAIN - FUNCTIONAL ASSESSMENT: PAIN_FUNCTIONAL_ASSESSMENT: NONE - DENIES PAIN

## 2025-04-24 ASSESSMENT — PAIN SCALES - GENERAL: PAINLEVEL_OUTOF10: 0

## 2025-04-24 NOTE — ANESTHESIA POSTPROCEDURE EVALUATION
Department of Anesthesiology  Postprocedure Note    Patient: Alissa Bearden  MRN: [id number]  YOB: 1960  Date of evaluation: 4/24/2025    Procedure Summary       Date: 04/24/25 Room / Location: [facility] MAIN OR 09 / [facility] MAIN OR    Anesthesia Start: 1407 Anesthesia Stop: 1457    Procedure: HYSTEROSCOPY DILATION AND CURRETAGE, CERVICAL BIOPSY (Uterus) Diagnosis:       Postmenopausal bleeding      (Postmenopausal bleeding [N95.0])    Providers: Amilcar Carpenter MD Responsible Provider: Guerrero Barbosa DO    Anesthesia Type: General ASA Status: 2            Anesthesia Type: General    Ayesha Phase I: Ayesha Score: 9    Ayesha Phase II:      Anesthesia Post Evaluation    Patient location during evaluation: PACU  Patient participation: complete - patient participated  Level of consciousness: awake and alert  Pain score: 0  Airway patency: patent  Nausea & Vomiting: no nausea  Cardiovascular status: hemodynamically stable  Respiratory status: acceptable  Hydration status: euvolemic  Comments: Seen, no complaints   Pain management: adequate    No notable events documented.

## 2025-04-24 NOTE — ANESTHESIA PRE PROCEDURE
Department of Anesthesiology  Preprocedure Note       Name:  Alissa Bearden   Age:  64 y.o.  :  1960                                          MRN:  [id number]         Date:  2025      Surgeon: Surgeon(s):  Amilcar Carpenter MD    Procedure: Procedure(s):  HYSTEROSCOPY DILATION AND CURRETAGE    Medications prior to admission:   Prior to Admission medications    Medication Sig Start Date End Date Taking? Authorizing Provider   ALPRAZolam (XANAX) 0.5 MG tablet 1 tab PO an hour prior to procedure for anxiety. Can repeat after 30 minutes if no relief. 25 Yes Lexa Carmona MD   semaglutide, 2 MG/DOSE, (OZEMPIC) 8 MG/3ML SOPN sc injection Inject 2 mg into the skin every 7 days  Patient taking differently: Inject 2 mg into the skin every 7 days TAKES ON SUNDAYS, LAST DOSE PER PT 04/13/25 3/14/25  Yes Lexa Carmona MD   lisinopril (PRINIVIL;ZESTRIL) 20 MG tablet TAKE 1 TABLET BY MOUTH ONCE DAILY IN THE MORNING 25  Yes Lexa Carmona MD   PARoxetine (PAXIL) 40 MG tablet TAKE 1 TABLET BY MOUTH ONCE DAILY IN THE MORNING 25  Yes Lexa Carmona MD   omeprazole (PRILOSEC) 20 MG delayed release capsule Take 1 capsule by mouth daily 24  Yes Lexa Carmona MD   acetaminophen (TYLENOL) 500 MG CAPS capsule Take 1 capsule by mouth every 4 hours as needed 1/15/20  Yes Automatic Reconciliation, Ar   Cyanocobalamin (VITAMIN B 12 PO) Take 1 tablet by mouth daily    Kathy Christian MD   celecoxib (CELEBREX) 200 MG capsule Take 1 capsule by mouth once daily 25   Lexa Carmona MD   Multiple Vitamin (MULTIVITAMIN PO) Take by mouth daily    ProviderKathy MD       Current medications:    Current Facility-Administered Medications   Medication Dose Route Frequency Provider Last Rate Last Admin   • sodium chloride flush 0.9 % injection 5-40 mL  5-40 mL IntraVENous 2 times per day Stephanie Keller PA-C       • sodium chloride flush 0.9 % injection

## 2025-04-24 NOTE — INTERVAL H&P NOTE
Update History & Physical    The patient's History and Physical of April 9, 2025 was reviewed with the patient and I examined the patient. There was no change. The surgical site was confirmed by the patient and me.     Plan: The risks, benefits, expected outcome, and alternative to the recommended procedure have been discussed with the patient. Patient understands and wants to proceed with the procedure.     Electronically signed by Amilcar Carpenter MD on 4/24/2025 at 1:25 PM

## 2025-04-24 NOTE — OP NOTE
Gynecologic Oncology Operative Report    Alissa Bearden  4/24/2025    Pre-operative dx:  1) PMB     Post-operative dx:  1) PMB; 2) Cervical polyp/lesion    Procedure:    Pelvic exam under anesthesia, cervical dilation, hysteroscopy, MyoSure curetting, cervical biopsy     Surgeon:  Amilcar Carpenter MD     Assistant:  n/a     Anesthesia:  GETA    EBL:  minimal    Antibiotics: not indicated     VTE Prophylaxis: SCDs     Complications:  None    Implants: None     Specimens:  cervical biopsy, ECC, Endometrial curettings     Operative indications:  64 y.o. female with PMB    Operative findings:  On hysteroscopy survey, the cervix had a small polyp along it's path. The endometrial cavity was atrophic. Scarred down bilateral tubal ostia. There was a 5mm lesion on the cervix at 9 o'clock.      Operative report: After informed consent was obtained, the patient was taken to the operating room where anesthesia was administered and deemed adequate. The patient was positioned in the dorsal lithotomy position in Chucky stirrups. Time-out was performed. The patient was prepped and draped in the normal sterile fashion. In-and-out urethral catheterization was performed. The patient was examined under anesthesia and the above findings were noted.     A bivalve speculum was placed in the patient's vagina. The anterior lip of the cervix was visualized and grasped with a single-toothed tenaculum. Using Lee dilators, the cervix was dilated with #25 without difficulty. The uterus was sounded to 8-cm. Using saline as the insufflating medium, the hysteroscope was introduced into the uterine cavity. The entire cavity was inspected and the above findings noted. The MyoSure device was then inserted into the uterine cavity and the entire endometrial cavity sampled. The hysteroscope was then removed. An endocervical curettage was then performed. Using a [employer] biopsy forcep, a cervical biopsy at 9 o'clock was obtain of a small 5mm polypoid

## 2025-04-24 NOTE — DISCHARGE INSTRUCTIONS
pharmacy on record.  Keep your medications in the bottles provided by the pharmacist and keep a list of the medication names, dosages, times to be taken and what they are for in your wallet.   Do not take other medications without consulting your doctor.   You may take acetaminophen (Tylenol) and ibuprofen (Advil) for pain.   You should take a daily gentle stool softener such as a colace pill or dulcolax suppository for constipation as this is not uncommon after surgery and/or while on pain medication. If not prescribed, this can be found over the counter. If constipation persists for >24 hours you should take a dose of Milk of Magnesia. Call if your constipation continues.      Activity    If possible, have someone with you at all times until you feel stable.  Gradually increase your activity each day. There are generally no restrictions on walking, climbing stairs or riding in a car.  Walk at least 4 times per day.  Walking will help reduce the risk of blood clots and constipation.  Showers are okay. If you have an incision, no tub bathing/swimming for two weeks.    No driving while on narcotic pain medication.       Incision    You should expect some discomfort in the area of your surgery. You may also expect some spotting for a few days to weeks.    Causes For Concern    If any of the following occur, please call our office and speak with the Nurse/aid who will help you with your problem or ask the doctor to call you.    Problems with the incision, including increasing pain, swelling, redness or drainage.   Inability to pass urine   Increasing abdominal pain despite medication  Persisting nausea or vomiting.   Fever or chills and a temperature >101F  Constipation (no bowel movement for three days).   Diarrhea (more than three watery stools within 24 hours).   Excessive vaginal or wound bleeding.  If after hours and you cannot reach an on-call physician, call 911.      Follow-Up    Call [phone] to schedule

## 2025-05-08 ENCOUNTER — RESULTS FOLLOW-UP (OUTPATIENT)
Age: 65
End: 2025-05-08

## 2025-05-20 ENCOUNTER — TELEMEDICINE (OUTPATIENT)
Age: 65
End: 2025-05-20

## 2025-05-20 DIAGNOSIS — N95.0 PMB (POSTMENOPAUSAL BLEEDING): Primary | ICD-10-CM

## 2025-05-20 PROCEDURE — 99024 POSTOP FOLLOW-UP VISIT: CPT | Performed by: OBSTETRICS & GYNECOLOGY

## 2025-05-20 NOTE — PROGRESS NOTES
Virtual Post op Visit to discuss pathology report, surgery was on 4/24/25    1. Have you been to the ER, urgent care clinic since your last visit?  Hospitalized since your last visit?  Yes, surgery with Dr. Carpenter on 4/24/25    2. Have you seen or consulted any other health care providers outside of the [facility] since your last visit?  Include any pap smears or colon screening.   no  
current use of insulin (HCC) 03/30/2016    History of colon polyps 09/24/2015    Cervical polyp     Anxiety 03/03/2014    Restless leg syndrome 09/09/2013    Menopausal syndrome (hot flashes) 09/09/2013    Hypertension, essential 05/04/2011     PMH:  Past Medical History:   Diagnosis Date    Arthritis     Diabetes mellitus (HCC)     TYPE 2    Fibroid March 2025    Hearing aid worn     Hypertension     Menopause ovarian failure     Needle phobia     Partial hamstring tear 04/11/2022      PSH:  Past Surgical History:   Procedure Laterality Date    BUNIONECTOMY Left 2008    COLONOSCOPY  08/13/2021    ileocecall vavle inflammation - Dr Dave Greco - [facility]    COLONOSCOPY  07/30/2012    polyp, repeat in 3 yrs    COLONOSCOPY  09/24/2015    normal    COLPOSCOPY  2006    DENTAL SURGERY  2011    dental implants    FOOT ARTHRODESIS Left 05/08/2015    LEFT FOOT LAPIDUS ARTHRODESIS, LEFT 2ND, 3RD MET CUNEIFORM FUSION, GASTROCNEMIS RECESSION, STJ ARTHROERESIS (GENERAL WITH POPLITEAL BLOCK)    HYSTEROSCOPY N/A 04/24/2025    HYSTEROSCOPY DILATION AND CURRETAGE, CERVICAL BIOPSY - benign - performed by Amilcar Carpenter MD at [facility] MAIN OR    KNEE ARTHROSCOPY Left 2013    torn meniscus    KNEE ARTHROSCOPY Right 2014    ORTHOPEDIC SURGERY  12/28/2022    [facility] - RIGHT FOOT, MIDFOOT FUSION/STIFFENING (1ST TARSO-METATARSAL JOINT)    ORTHOPEDIC SURGERY Right 01/08/2025    hardware removal from R foot    REVISION TOTAL KNEE ARTHROPLASTY Left 01/15/2020    TOTAL KNEE ARTHROPLASTY Bilateral 04/13/2016      Social History:  Social History     Tobacco Use    Smoking status: Never     Passive exposure: Never    Smokeless tobacco: Never   Substance Use Topics    Alcohol use: Yes     Alcohol/week: 2.0 standard drinks of alcohol     Types: 2 Glasses of wine per week      Family History:  Family History   Problem Relation Age of Onset    Heart Failure Mother     Hypertension Father     Parkinson's Disease

## 2025-07-31 ENCOUNTER — TELEPHONE (OUTPATIENT)
Age: 65
End: 2025-07-31

## 2025-08-12 SDOH — HEALTH STABILITY: PHYSICAL HEALTH: ON AVERAGE, HOW MANY DAYS PER WEEK DO YOU ENGAGE IN MODERATE TO STRENUOUS EXERCISE (LIKE A BRISK WALK)?: 2 DAYS

## 2025-08-12 SDOH — HEALTH STABILITY: PHYSICAL HEALTH: ON AVERAGE, HOW MANY MINUTES DO YOU ENGAGE IN EXERCISE AT THIS LEVEL?: 20 MIN

## 2025-08-12 ASSESSMENT — LIFESTYLE VARIABLES
HOW OFTEN DO YOU HAVE A DRINK CONTAINING ALCOHOL: 2-4 TIMES A MONTH
HOW MANY STANDARD DRINKS CONTAINING ALCOHOL DO YOU HAVE ON A TYPICAL DAY: 1
HOW OFTEN DO YOU HAVE SIX OR MORE DRINKS ON ONE OCCASION: 1
HOW MANY STANDARD DRINKS CONTAINING ALCOHOL DO YOU HAVE ON A TYPICAL DAY: 1 OR 2
HOW OFTEN DO YOU HAVE A DRINK CONTAINING ALCOHOL: 3

## 2025-08-12 ASSESSMENT — PATIENT HEALTH QUESTIONNAIRE - PHQ9
SUM OF ALL RESPONSES TO PHQ QUESTIONS 1-9: 0
1. LITTLE INTEREST OR PLEASURE IN DOING THINGS: NOT AT ALL
2. FEELING DOWN, DEPRESSED OR HOPELESS: NOT AT ALL
SUM OF ALL RESPONSES TO PHQ QUESTIONS 1-9: 0

## 2025-08-19 ENCOUNTER — OFFICE VISIT (OUTPATIENT)
Age: 65
End: 2025-08-19
Payer: MEDICARE

## 2025-08-19 VITALS
OXYGEN SATURATION: 95 % | SYSTOLIC BLOOD PRESSURE: 103 MMHG | HEIGHT: 68 IN | TEMPERATURE: 97 F | WEIGHT: 194.6 LBS | HEART RATE: 102 BPM | RESPIRATION RATE: 16 BRPM | BODY MASS INDEX: 29.49 KG/M2 | DIASTOLIC BLOOD PRESSURE: 78 MMHG

## 2025-08-19 DIAGNOSIS — F41.9 ANXIETY: ICD-10-CM

## 2025-08-19 DIAGNOSIS — N95.1 MENOPAUSAL SYNDROME (HOT FLASHES): ICD-10-CM

## 2025-08-19 DIAGNOSIS — E11.9 CONTROLLED TYPE 2 DIABETES MELLITUS WITHOUT COMPLICATION, WITHOUT LONG-TERM CURRENT USE OF INSULIN (HCC): ICD-10-CM

## 2025-08-19 DIAGNOSIS — Z71.89 ADVANCED CARE PLANNING/COUNSELING DISCUSSION: ICD-10-CM

## 2025-08-19 DIAGNOSIS — K21.9 GASTROESOPHAGEAL REFLUX DISEASE WITHOUT ESOPHAGITIS: ICD-10-CM

## 2025-08-19 DIAGNOSIS — Z12.31 ENCOUNTER FOR SCREENING MAMMOGRAM FOR BREAST CANCER: ICD-10-CM

## 2025-08-19 DIAGNOSIS — Z86.39 HISTORY OF OBESITY: ICD-10-CM

## 2025-08-19 DIAGNOSIS — E78.00 PURE HYPERCHOLESTEROLEMIA: ICD-10-CM

## 2025-08-19 DIAGNOSIS — Z00.00 MEDICARE ANNUAL WELLNESS VISIT, INITIAL: Primary | ICD-10-CM

## 2025-08-19 DIAGNOSIS — I10 HYPERTENSION, ESSENTIAL: ICD-10-CM

## 2025-08-19 PROBLEM — N95.0 PMB (POSTMENOPAUSAL BLEEDING): Status: RESOLVED | Noted: 2025-04-09 | Resolved: 2025-08-19

## 2025-08-19 PROCEDURE — G8417 CALC BMI ABV UP PARAM F/U: HCPCS | Performed by: INTERNAL MEDICINE

## 2025-08-19 PROCEDURE — G8399 PT W/DXA RESULTS DOCUMENT: HCPCS | Performed by: INTERNAL MEDICINE

## 2025-08-19 PROCEDURE — G0438 PPPS, INITIAL VISIT: HCPCS | Performed by: INTERNAL MEDICINE

## 2025-08-19 PROCEDURE — G8427 DOCREV CUR MEDS BY ELIG CLIN: HCPCS | Performed by: INTERNAL MEDICINE

## 2025-08-19 PROCEDURE — 99214 OFFICE O/P EST MOD 30 MIN: CPT | Performed by: INTERNAL MEDICINE

## 2025-08-19 PROCEDURE — 3017F COLORECTAL CA SCREEN DOC REV: CPT | Performed by: INTERNAL MEDICINE

## 2025-08-19 PROCEDURE — 2022F DILAT RTA XM EVC RTNOPTHY: CPT | Performed by: INTERNAL MEDICINE

## 2025-08-19 PROCEDURE — 3074F SYST BP LT 130 MM HG: CPT | Performed by: INTERNAL MEDICINE

## 2025-08-19 PROCEDURE — 1123F ACP DISCUSS/DSCN MKR DOCD: CPT | Performed by: INTERNAL MEDICINE

## 2025-08-19 PROCEDURE — 1090F PRES/ABSN URINE INCON ASSESS: CPT | Performed by: INTERNAL MEDICINE

## 2025-08-19 PROCEDURE — 1126F AMNT PAIN NOTED NONE PRSNT: CPT | Performed by: INTERNAL MEDICINE

## 2025-08-19 PROCEDURE — 3044F HG A1C LEVEL LT 7.0%: CPT | Performed by: INTERNAL MEDICINE

## 2025-08-19 PROCEDURE — 1159F MED LIST DOCD IN RCRD: CPT | Performed by: INTERNAL MEDICINE

## 2025-08-19 PROCEDURE — G2211 COMPLEX E/M VISIT ADD ON: HCPCS | Performed by: INTERNAL MEDICINE

## 2025-08-19 PROCEDURE — 3078F DIAST BP <80 MM HG: CPT | Performed by: INTERNAL MEDICINE

## 2025-08-19 PROCEDURE — 1036F TOBACCO NON-USER: CPT | Performed by: INTERNAL MEDICINE

## 2025-08-19 PROCEDURE — 1160F RVW MEDS BY RX/DR IN RCRD: CPT | Performed by: INTERNAL MEDICINE

## 2025-08-19 SDOH — ECONOMIC STABILITY: FOOD INSECURITY: WITHIN THE PAST 12 MONTHS, THE FOOD YOU BOUGHT JUST DIDN'T LAST AND YOU DIDN'T HAVE MONEY TO GET MORE.: NEVER TRUE

## 2025-08-19 SDOH — ECONOMIC STABILITY: FOOD INSECURITY: WITHIN THE PAST 12 MONTHS, YOU WORRIED THAT YOUR FOOD WOULD RUN OUT BEFORE YOU GOT MONEY TO BUY MORE.: NEVER TRUE

## 2025-08-19 ASSESSMENT — ENCOUNTER SYMPTOMS
COUGH: 0
ABDOMINAL PAIN: 0
NAUSEA: 0
BLOOD IN STOOL: 0
VOMITING: 0
DIARRHEA: 0
SHORTNESS OF BREATH: 0
CONSTIPATION: 0

## (undated) DEVICE — DERMABOND SKIN ADH 0.7ML -- DERMABOND ADVANCED 12/BX

## (undated) DEVICE — KIT,1200CC CANISTER,3/16"X6' TUBING: Brand: MEDLINE INDUSTRIES, INC.

## (undated) DEVICE — GOWN,PREVENTION PLUS,XLN/2XL,ST,22/CS: Brand: MEDLINE

## (undated) DEVICE — INSTRMT BB TAK LP MTP PLT --

## (undated) DEVICE — CONTAINER,SPECIMEN,4OZ,OR STRL: Brand: MEDLINE

## (undated) DEVICE — SPONGE GZ W4XL4IN COT RADPQ HIGHLY ABSRB

## (undated) DEVICE — SYR 20ML LL STRL LF --

## (undated) DEVICE — DISPOSABLE TOURNIQUET CUFF SINGLE BLADDER, DUAL PORT AND QUICK CONNECT CONNECTOR: Brand: COLOR CUFF

## (undated) DEVICE — GOWN,SIRUS,NONRNF,SETINSLV,XL,20/CS: Brand: MEDLINE

## (undated) DEVICE — DRAPE,EXTREMITY,89X128,STERILE: Brand: MEDLINE

## (undated) DEVICE — STERILE POLYISOPRENE POWDER-FREE SURGICAL GLOVES WITH EMOLLIENT COATING: Brand: PROTEXIS

## (undated) DEVICE — SYRINGE MED 3ML CLR PLAS STD N CTRL LUERLOCK TIP DISP

## (undated) DEVICE — BIT DRL L5IN DIA2MM STD ST S STL TWST BUSA

## (undated) DEVICE — STRYKER PERFORMANCE SERIES SAGITTAL BLADE: Brand: STRYKER PERFORMANCE SERIES

## (undated) DEVICE — YANKAUER,FLEXIBLE HANDLE,REGLR CAPACITY: Brand: MEDLINE INDUSTRIES, INC.

## (undated) DEVICE — PREP SKN PREVAIL 40ML APPL --

## (undated) DEVICE — KENDALL DL ECG CABLE AND LEAD WIRE SYSTEM, 3-LEAD, SINGLE PATIENT USE: Brand: KENDALL

## (undated) DEVICE — PAD,SANITARY,11 IN,MAXI,N-STRL,IND WRAP: Brand: MEDLINE

## (undated) DEVICE — SYRINGE MED 10ML LUERLOCK TIP W/O SFTY DISP

## (undated) DEVICE — SOLUTION IRRIG 3000ML 0.9% SOD CHL FLX CONT 0797208] ICU MEDICAL INC]

## (undated) DEVICE — SUTURE ETHLN SZ 3-0 L18IN NONABSORBABLE BLK PS-2 L19MM 3/8 1669H

## (undated) DEVICE — SYRINGE,EAR/ULCER, 2 OZ, STERILE: Brand: MEDLINE

## (undated) DEVICE — GLOVE ORANGE PI 7 1/2   MSG9075

## (undated) DEVICE — PADDING CAST SPEC 6INX4YD COT --

## (undated) DEVICE — Z INACTIVE USE 2854267 SPONGE GZ W4XL4IN COT 12 PLY TYP VII WVN C FLD DSGN

## (undated) DEVICE — SOLUTION IRRIG 1000ML 0.9% SOD CHL USP POUR PLAS BTL

## (undated) DEVICE — T5 HOOD WITH PEEL AWAY FACE SHIELD

## (undated) DEVICE — INTENDED FOR TISSUE SEPARATION, AND OTHER PROCEDURES THAT REQUIRE A SHARP SURGICAL BLADE TO PUNCTURE OR CUT.: Brand: BARD-PARKER ® CARBON RIB-BACK BLADES

## (undated) DEVICE — SUTURE NONABSORBABLE MONOFILAMENT 2-0 FS 18 IN ETHILON 664H

## (undated) DEVICE — BANDAGE,ELASTIC,ESMARK,STERILE,4"X9',LF: Brand: MEDLINE

## (undated) DEVICE — DRAPE,LITHOTOMY,STERILE: Brand: MEDLINE

## (undated) DEVICE — COVER LT HNDL PLAS RIG 1 PER PK

## (undated) DEVICE — COVER,TABLE,60X90,STERILE: Brand: MEDLINE

## (undated) DEVICE — 4.0MM EGG

## (undated) DEVICE — Device

## (undated) DEVICE — SCREW EXT FIX L14MM FOR DISTRCTN

## (undated) DEVICE — SUTURE VCRL 2-0 L27IN ABSRB UD PS-2 L19MM 1/2 CIR J428H

## (undated) DEVICE — SPONGE GZ W4XL4IN COT 12 PLY TYP VII WVN C FLD DSGN

## (undated) DEVICE — 4-PORT MANIFOLD: Brand: NEPTUNE 2

## (undated) DEVICE — STRAP,POSITIONING,KNEE/BODY,FOAM,4X60": Brand: MEDLINE

## (undated) DEVICE — CONTAINER,SPECIMEN,3OZ,OR STRL: Brand: MEDLINE

## (undated) DEVICE — SOL IRR SOD CHL 0.9% TITAN XL CNTNR 3000ML

## (undated) DEVICE — YANKAUER,OPEN TIP,W/O VENT,STERILE: Brand: MEDLINE INDUSTRIES, INC.

## (undated) DEVICE — SYR 10ML LUER LOK 1/5ML GRAD --

## (undated) DEVICE — BIT DRL DIA2.5MM LNG GRAD FOR ANK FRAC MGMT SYS

## (undated) DEVICE — 3M™ IOBAN™ 2 ANTIMICROBIAL INCISE DRAPE 6651EZ: Brand: IOBAN™ 2

## (undated) DEVICE — FORCEPS BX L240CM JAW DIA2.8MM L CAP W/ NDL MIC MESH TOOTH

## (undated) DEVICE — GLOVE ORANGE PI 7   MSG9070

## (undated) DEVICE — BNDG ELAS HK LOOP 4X5YD NS -- MATRIX

## (undated) DEVICE — INFECTION CONTROL KIT SYS

## (undated) DEVICE — SUTURE MCRYL SZ 3-0 L27IN ABSRB UD L24MM PS-1 3/8 CIR PRIM Y936H

## (undated) DEVICE — SYRINGE 50ML E/T

## (undated) DEVICE — SCRUB DRY SURG EZ SCRUB BRUSH PREOPERATIVE GRN

## (undated) DEVICE — SUTURE VCRL SZ 0 L27IN ABSRB UD L36MM CT-1 1/2 CIR J260H

## (undated) DEVICE — DRESSING,GAUZE,XEROFORM,CURAD,1"X8",ST: Brand: CURAD

## (undated) DEVICE — MARKER,SKIN,WI/RULER AND LABELS: Brand: MEDLINE

## (undated) DEVICE — SUTURE STRATAFIX SPRL SZ 1 L14IN ABSRB VLT L48CM CTX 1/2 SXPD2B405

## (undated) DEVICE — CARTRIDGE BNE CEM MIX UNIV TWR VAC ROTOR BRK OFF NOZ W/O

## (undated) DEVICE — Z DISCONTINUED USE 2744636  DRESSING AQUACEL 14 IN ALG W3.5XL14IN POLYUR FLM CVR W/ HYDRCOLL

## (undated) DEVICE — TOWEL,OR,DSP,ST,BLUE,STD,4/PK,20PK/CS: Brand: MEDLINE

## (undated) DEVICE — (D)PREP SKN CHLRAPRP APPL 26ML -- CONVERT TO ITEM 371833

## (undated) DEVICE — REM POLYHESIVE ADULT PATIENT RETURN ELECTRODE: Brand: VALLEYLAB

## (undated) DEVICE — PADDING CST 6IN STERILE --

## (undated) DEVICE — GARMENT,MEDLINE,DVT,INT,CALF,MED, GEN2: Brand: MEDLINE

## (undated) DEVICE — 2T11 #2 PDO 36 X 36: Brand: 2T11 #2 PDO 36 X 36

## (undated) DEVICE — SUTURE VCRL SZ 2-0 L36IN ABSRB UD L40MM CT 1/2 CIR J957H

## (undated) DEVICE — C-ARM: Brand: UNBRANDED

## (undated) DEVICE — SUTURE VCRL 1 L27IN ABSRB CT BRAID COAT UD J281H

## (undated) DEVICE — SKIN PREP TRAY 4 COMPARTM TRAY: Brand: MEDLINE INDUSTRIES, INC.

## (undated) DEVICE — PREP SKN CHLRAPRP APL 26ML STR --

## (undated) DEVICE — PRECISION THIN (9.0 X 0.38 X 25.0MM)

## (undated) DEVICE — EXTREMITY - SMH: Brand: MEDLINE INDUSTRIES, INC.

## (undated) DEVICE — BANDAGE COMPR M W6INXL10YD WHT BGE VELC E MTRX HK AND LOOP

## (undated) DEVICE — NEEDLE HYPO 22GA L1.5IN BLK S STL HUB POLYPR SHLD REG BVL

## (undated) DEVICE — SOLUTION IRRIG 1000ML H2O STRL BLT

## (undated) DEVICE — GUIDEWIRE ORTH DIA0.053IN THRD W/ TRCR TIP LSR LN FOR

## (undated) DEVICE — LEGGINGS, PAIR, 31X48, STERILE: Brand: MEDLINE

## (undated) DEVICE — PADDING CAST W3INXL4YD POLY POR SPUN DACRON SYN VERSATILE

## (undated) DEVICE — HANDPIECE SET WITH BONE CLEANING TIP AND SUCTION TUBE: Brand: INTERPULSE

## (undated) DEVICE — SHEET,DRAPE,UNDERBUTTOCK,GRAD POUCH,PORT: Brand: MEDLINE

## (undated) DEVICE — BASIC SINGLE BASIN BTC-LF: Brand: MEDLINE INDUSTRIES, INC.

## (undated) DEVICE — SUTURE ETHLN SZ 4-0 L18IN NONABSORBABLE BLK L16MM PC-3 3/8 1864G

## (undated) DEVICE — TUBING, SUCTION, 1/4" X 12', STRAIGHT: Brand: MEDLINE

## (undated) DEVICE — BANDAGE,GAUZE,CONFORMING,3"X75",STRL,LF: Brand: MEDLINE

## (undated) DEVICE — STERILE POLYISOPRENE POWDER-FREE SURGICAL GLOVES: Brand: PROTEXIS

## (undated) DEVICE — ENDO CARRY-ON PROCEDURE KIT INCLUDES ENZYMATIC SPONGE, GAUZE, BIOHAZARD LABEL, TRAY, LUBRICANT, DIRTY SCOPE LABEL, WATER LABEL, TRAY, DRAWSTRING PAD, AND DEFENDO 4-PIECE KIT.: Brand: ENDO CARRY-ON PROCEDURE KIT

## (undated) DEVICE — BANDAGE COBAN 4 IN COMPR W4INXL5YD FOAM COHESIVE QUIK STK SELF ADH SFT

## (undated) DEVICE — HYPODERMIC SAFETY NEEDLE: Brand: MONOJECT

## (undated) DEVICE — BANDAGE,GAUZE,BULKEE II,4.5"X4.1YD,STRL: Brand: MEDLINE